# Patient Record
Sex: MALE | Race: BLACK OR AFRICAN AMERICAN | NOT HISPANIC OR LATINO | Employment: OTHER | URBAN - METROPOLITAN AREA
[De-identification: names, ages, dates, MRNs, and addresses within clinical notes are randomized per-mention and may not be internally consistent; named-entity substitution may affect disease eponyms.]

---

## 2017-04-22 ENCOUNTER — HOSPITAL ENCOUNTER (EMERGENCY)
Facility: HOSPITAL | Age: 62
Discharge: HOME | End: 2017-04-22
Attending: EMERGENCY MEDICINE
Payer: MEDICARE

## 2017-04-22 VITALS
HEART RATE: 70 BPM | BODY MASS INDEX: 29.62 KG/M2 | SYSTOLIC BLOOD PRESSURE: 144 MMHG | DIASTOLIC BLOOD PRESSURE: 72 MMHG | OXYGEN SATURATION: 99 % | WEIGHT: 200 LBS | HEIGHT: 69 IN | TEMPERATURE: 98 F | RESPIRATION RATE: 18 BRPM

## 2017-04-22 DIAGNOSIS — N39.0 URINARY TRACT INFECTION WITHOUT HEMATURIA, SITE UNSPECIFIED: Primary | ICD-10-CM

## 2017-04-22 LAB
ALBUMIN SERPL BCP-MCNC: 3.7 G/DL
ALP SERPL-CCNC: 56 U/L
ALT SERPL W/O P-5'-P-CCNC: 23 U/L
ANION GAP SERPL CALC-SCNC: 11 MMOL/L
APTT BLDCRRT: 26.3 SEC
AST SERPL-CCNC: 24 U/L
BACTERIA #/AREA URNS HPF: ABNORMAL /HPF
BASOPHILS # BLD AUTO: 0.02 K/UL
BASOPHILS NFR BLD: 0.3 %
BILIRUB SERPL-MCNC: 0.3 MG/DL
BILIRUB UR QL STRIP: NEGATIVE
BUN SERPL-MCNC: 18 MG/DL
CALCIUM SERPL-MCNC: 9 MG/DL
CHLORIDE SERPL-SCNC: 106 MMOL/L
CLARITY UR: ABNORMAL
CO2 SERPL-SCNC: 26 MMOL/L
COLOR UR: YELLOW
CREAT SERPL-MCNC: 1.9 MG/DL
DIFFERENTIAL METHOD: ABNORMAL
EOSINOPHIL # BLD AUTO: 0.2 K/UL
EOSINOPHIL NFR BLD: 3 %
ERYTHROCYTE [DISTWIDTH] IN BLOOD BY AUTOMATED COUNT: 15.5 %
EST. GFR  (AFRICAN AMERICAN): 43 ML/MIN/1.73 M^2
EST. GFR  (NON AFRICAN AMERICAN): 37 ML/MIN/1.73 M^2
GLUCOSE SERPL-MCNC: 175 MG/DL
GLUCOSE UR QL STRIP: NEGATIVE
HCT VFR BLD AUTO: 39.7 %
HGB BLD-MCNC: 13.2 G/DL
HGB UR QL STRIP: ABNORMAL
INR PPP: 1
KETONES UR QL STRIP: NEGATIVE
LEUKOCYTE ESTERASE UR QL STRIP: ABNORMAL
LYMPHOCYTES # BLD AUTO: 2.8 K/UL
LYMPHOCYTES NFR BLD: 45.6 %
MCH RBC QN AUTO: 29.1 PG
MCHC RBC AUTO-ENTMCNC: 33.2 %
MCV RBC AUTO: 87 FL
MICROSCOPIC COMMENT: ABNORMAL
MONOCYTES # BLD AUTO: 0.2 K/UL
MONOCYTES NFR BLD: 2.5 %
NEUTROPHILS # BLD AUTO: 3 K/UL
NEUTROPHILS NFR BLD: 48.6 %
NITRITE UR QL STRIP: NEGATIVE
PH UR STRIP: 7 [PH] (ref 5–8)
PLATELET # BLD AUTO: 372 K/UL
PMV BLD AUTO: 11.1 FL
POTASSIUM SERPL-SCNC: 4.3 MMOL/L
PROT SERPL-MCNC: 7.3 G/DL
PROT UR QL STRIP: NEGATIVE
PROTHROMBIN TIME: 10.5 SEC
RBC # BLD AUTO: 4.54 M/UL
RBC #/AREA URNS HPF: 4 /HPF (ref 0–4)
SODIUM SERPL-SCNC: 143 MMOL/L
SP GR UR STRIP: 1.01 (ref 1–1.03)
TROPONIN I SERPL DL<=0.01 NG/ML-MCNC: <0.006 NG/ML
URN SPEC COLLECT METH UR: ABNORMAL
UROBILINOGEN UR STRIP-ACNC: NEGATIVE EU/DL
WBC # BLD AUTO: 6.1 K/UL
WBC #/AREA URNS HPF: 80 /HPF (ref 0–5)
WBC CLUMPS URNS QL MICRO: ABNORMAL

## 2017-04-22 PROCEDURE — 87088 URINE BACTERIA CULTURE: CPT

## 2017-04-22 PROCEDURE — 81000 URINALYSIS NONAUTO W/SCOPE: CPT

## 2017-04-22 PROCEDURE — 87186 SC STD MICRODIL/AGAR DIL: CPT

## 2017-04-22 PROCEDURE — 85730 THROMBOPLASTIN TIME PARTIAL: CPT

## 2017-04-22 PROCEDURE — 63600175 PHARM REV CODE 636 W HCPCS: Performed by: EMERGENCY MEDICINE

## 2017-04-22 PROCEDURE — 87086 URINE CULTURE/COLONY COUNT: CPT

## 2017-04-22 PROCEDURE — 96365 THER/PROPH/DIAG IV INF INIT: CPT

## 2017-04-22 PROCEDURE — 99284 EMERGENCY DEPT VISIT MOD MDM: CPT | Mod: 25

## 2017-04-22 PROCEDURE — 96361 HYDRATE IV INFUSION ADD-ON: CPT

## 2017-04-22 PROCEDURE — 85610 PROTHROMBIN TIME: CPT

## 2017-04-22 PROCEDURE — 87077 CULTURE AEROBIC IDENTIFY: CPT

## 2017-04-22 PROCEDURE — 80053 COMPREHEN METABOLIC PANEL: CPT

## 2017-04-22 PROCEDURE — 25000003 PHARM REV CODE 250: Performed by: EMERGENCY MEDICINE

## 2017-04-22 PROCEDURE — 84484 ASSAY OF TROPONIN QUANT: CPT

## 2017-04-22 PROCEDURE — 85025 COMPLETE CBC W/AUTO DIFF WBC: CPT

## 2017-04-22 RX ORDER — CEPHALEXIN 500 MG/1
500 CAPSULE ORAL 4 TIMES DAILY
Qty: 40 CAPSULE | Refills: 0 | Status: SHIPPED | OUTPATIENT
Start: 2017-04-22 | End: 2017-05-02

## 2017-04-22 RX ADMIN — CEFTRIAXONE 1 G: 1 INJECTION, SOLUTION INTRAVENOUS at 09:04

## 2017-04-22 RX ADMIN — SODIUM CHLORIDE 1000 ML: 0.9 INJECTION, SOLUTION INTRAVENOUS at 06:04

## 2017-04-22 NOTE — ED TRIAGE NOTES
"Pt arrived via personal transportation from home. CC of LOC. Per care taker "He was in the car waiting on his wife to come out side, i saw his eyes roll back and he became unresponsive." Pt presents non-verbal, per caregiver, he can verbally communicate, but when getting medical attention he chooses not to speak. Pt is not reporting any pain/N/V/F/D/SOB/CP at this time. NAD at this time  "

## 2017-04-22 NOTE — ED AVS SNAPSHOT
[facility]  [place] [address]               Richard M Pipkins   2017  6:08 PM   ED    Description:  Male : 1955   Department:  [facility]           Your Care was Coordinated By:     Provider Role From To    Olvin Perez MD Attending Provider [id number] --      Reason for Visit     Loss of Consciousness           Diagnoses this Visit        Comments    Urinary tract infection without hematuria, site unspecified    -  Primary       ED Disposition     ED Disposition Condition Comment    Discharge  Our goal in the emergency department is to always give you outstanding care and exceptional service. You may receive a survey by mail or e-mail in the next week regarding your experience in our ED. We would greatly appreciate your completing and returnin g the survey. Your feedback provides us with a way to recognize our staff who give very good care and it helps us learn how to improve when your experience was below our aspiration of excellence.              To Do List           Follow-up Information     Follow up with Annalee Campso MD.    Specialty:  General Practice    Why:  As needed    Contact information:    [address] [facility] [address]  [facility] [address] [phone]          Follow up with [facility].    Specialty:  Emergency Medicine    Why:  If symptoms worsen    Contact information:    [place] [address] [phone]       These Medications        Disp Refills Start End    cephALEXin (KEFLEX) 500 MG capsule 40 capsule 0 2017    Take 1 capsule (500 mg total) by mouth 4 (four) times daily. - Oral    Pharmacy: [facility] - [place] [facility] [address] [facility]: [phone]         [facility]     [facility] Nurse Care Line -  Assistance  Unless otherwise directed by your provider, please contact [facility], our  nurse care line that is available for 24/7 assistance.     Registered nurses in the [facility] provide: appointment scheduling, clinical advisement, health education, and other advisory services.  Call: [phone] (toll free)               Medications           Message regarding Medications     Verify the changes and/or additions to your medication regime listed below are the same as discussed with your clinician today.  If any of these changes or additions are incorrect, please notify your healthcare provider.        START taking these NEW medications        Refills    cephALEXin (KEFLEX) 500 MG capsule 0    Sig: Take 1 capsule (500 mg total) by mouth 4 (four) times daily.    Class: Print    Route: Oral      These medications were administered today        Dose Freq    sodium chloride 0.9% bolus 1,000 mL 1,000 mL Once    Sig: Inject 1,000 mLs into the vein once.    Class: Normal    Route: Intravenous    cefTRIAXone (ROCEPHIN) 1 g in dextrose 5 % 50 mL IVPB 1 g ED 1 Time    Sig: Inject 50 mLs (1 g total) into the vein ED 1 Time.    Class: Normal    Route: Intravenous           Verify that the below list of medications is an accurate representation of the medications you are currently taking.  If none reported, the list may be blank. If incorrect, please contact your healthcare provider. Carry this list with you in case of emergency.           Current Medications     amlodipine (NORVASC) 10 MG tablet Take 10 mg by mouth once daily.     atorvastatin (LIPITOR) 40 MG tablet Take 40 mg by mouth once daily.     bethanechol (URECHOLINE) 25 MG Tab Take 25 mg by mouth once daily.     ergocalciferol (VITAMIN D2) 50,000 unit Cap Take 50,000 Units by mouth every 30 days.     lamotrigine (LAMICTAL) 100 MG tablet     lamotrigine (LAMICTAL) 25 MG tablet     LOVAZA 1 gram capsule Take 1 g by mouth 2 (two) times daily.     metoprolol succinate (TOPROL-XL) 100 MG 24 hr tablet Take 100 mg by mouth once daily.      "MULTIVIT-IRON-MIN-FOLIC ACID 3,500-18-0.4 UNIT-MG-MG ORAL CHEW Take by mouth.    multivitamin capsule Take 1 capsule by mouth once daily.    oxybutynin (DITROPAN) 5 MG Tab Take 5 mg by mouth 3 (three) times daily.     pravastatin (PRAVACHOL) 40 MG tablet Take 40 mg by mouth once daily.     RANEXA 500 mg Tb12 Take 500 mg by mouth 2 (two) times daily.     solifenacin (VESICARE) 5 MG tablet Take 10 mg by mouth once daily.    tamsulosin (FLOMAX) 0.4 mg Cp24 Take 0.4 mg by mouth once daily.     VICTOZA 3-ANATOLIY 0.6 mg/0.1 mL (18 mg/3 mL) PnIj Inject 1.2 mg into the skin every morning.     cefTRIAXone (ROCEPHIN) 1 g in dextrose 5 % 50 mL IVPB Inject 50 mLs (1 g total) into the vein ED 1 Time.    cephALEXin (KEFLEX) 500 MG capsule Take 1 capsule (500 mg total) by mouth 4 (four) times daily.    sertraline (ZOLOFT) 50 MG tablet Take 1.5 tablets (75 mg total) by mouth once daily.           Clinical Reference Information           Your Vitals Were     BP Pulse Temp Resp Height Weight    135/64 66 99 °F (37.2 °C) (Oral) 18 5' 9" (1.753 m) 90.7 kg (200 lb)    SpO2 BMI             97% 29.53 kg/m2         Allergies as of 4/22/2017        Reactions    Penicillins       Immunizations Administered on Date of Encounter - 4/22/2017     None      ED Micro, Lab, POCT     Start Ordered       Status Ordering Provider    04/22/17 2104 04/22/17 2103  Urine culture **CANNOT BE ORDERED STAT**  Once      In process     04/22/17 1819 04/22/17 1818  Protime-INR  STAT      Final result     04/22/17 1818 04/22/17 1818  Urinalysis Microscopic  Once      Final result     04/22/17 1816 04/22/17 1818  CBC auto differential  STAT      Final result     04/22/17 1816 04/22/17 1818  Comprehensive metabolic panel  STAT      Final result     04/22/17 1816 04/22/17 1818  Urinalysis - Clean Catch  STAT      Final result     04/22/17 1816 04/22/17 1818  APTT  STAT      Final result     04/22/17 1816 04/22/17 1818  Troponin I  STAT      Final result     04/22/17 " 1815 04/22/17 1814  POCT glucose  Once      Acknowledged       ED Imaging Orders     Start Ordered       Status Ordering Provider    04/22/17 1816 04/22/17 1818  X-Ray Chest AP Portable  1 time imaging      Final result     04/22/17 1816 04/22/17 1818  CT Head Without Contrast  1 time imaging      Final result         Discharge Instructions         Bladder Infection, Male (Adult)    You have a bladder infection.  Urine is normally free of bacteria. But bacteria can get into the urinary tract from the skin around the rectum or it may travel in the blood from elsewhere in the body.  This is called a urinary tract infection (UTI). An infection can occur anywhere in the urinary tract. It could be in a kidney (pyelonephritis)or in the bladder (cystitis) and urethra (urethritis). The urethra is the tube that drains the urine from the bladder through the tip of the penis.  The most common place for a UTI is in the bladder. This is called a bladder infection. Most bladder infections are easily treated. They are not serious unless the infection spreads up to the kidney.  The terms bladder infection, UTI, and cystitis are often used to describe the same thing, but they arent always the same. Cystitis is an inflammation of the bladder. The most common cause of cystitis is an infection.   Keep in mind:  · Infections in the urine are called UTIs.  · Cystitis is usually caused by a UTI.  · Not all UTIs and cases of cystitis are bladder infections.  · Bladder infections are the most common type of cystitis.  Symptoms of a bladder infection  The infection causes inflammation in the urethra and bladder. This inflammation causes many of the symptoms. The most common symptoms of a bladder infection are:  · Pain or burning when urinating  · Having to go more often than usual  · Feeling like you need to go right away  · Only a small amount comes out  · Blood in urine  · Discomfort in your belly (abdomen), usually in the lower abdomen,  above the pubic bone  · Cloudy, strong, or bad smelling urine  · Unable to urinate (retention)  · Urinary incontinence  · Fever  · Loss of appetite  Older adults may also feel confused.  Causes of a bladder infection  Bladder infections are not contagious. You can't get one from someone else, from a toilet seat, or from sharing a bath.  The most common cause of bladder infections is bacteria from the bowels. The bacteria get onto the skin around the opening of the urethra. From there they can get into the urine and travel up to the bladder. This causes inflammation and an infection. This usually happens because of:  · An enlarged prostate  · Poor cleaning of the genitals  · Procedures that put a tube in your bladder, like a Causey catheter  · Bowel incontinence  · Older age  · Not emptying your bladder (The urine stays there, giving the bacteria a chance to grow.)  · Dehydration (This allows urine to stay in the bladder longer.)  · Constipation (This can cause the bowels to push on the bladder or urethra and keep the bladder from emptying.)  Treatment  Bladder infections are treated with antibiotics. They usually clear up quickly without complications. Treatment helps prevent a more serious kidney infection.  Medicines  Medicines can help in the treatment of a bladder infection:  · You may have been given phenazopyridine to ease burning when you urinate. It will cause your urine to be bright orange. It can stain clothing.  · You may have been prescribed antibiotics. Take this medicine until you have finished it, even if you feel better. Taking all of the medicine will make sure the infection has cleared.  You can use acetaminophen or ibuprofen for pain, fever, or discomfort, unless another medicine was prescribed. You can also alternate them, or use both together. They work differently and are a different class of medicines, so taking them together is not an overdose. If you have chronic liver or kidney disease, talk  with your healthcare provider before using these medicines. Also talk with your provider if youve had a stomach ulcer or GI bleeding or are taking blood thinner medicines.  Home care  Here are some guidelines to help you care for yourself at home:  · Drink plenty of fluids, unless your healthcare provider told you not to. Fluids will prevent dehydration and flush out your bladder.  · Use good personal hygiene. Wipe from front to back after using the toilet, and clean your penis regularly. If you arent circumcised, retract the foreskin when cleaning.  · Urinate more frequently, and dont try to hold it in for long periods of time, if possible.  · Wear loose-fitting clothes and cotton underwear. Avoid tight-fitting pants. This helps keep you clean and dry.  · Change your diet to prevent constipation. This means eating more fresh foods and more fiber, and less junk and fatty foods.  · Avoid sex until your symptoms are gone.  · Avoid caffeine, alcohol, and spicy foods. These can irritate the bladder.  Follow-up care  Follow up with your healthcare provider, or as advised if all symptoms have not cleared up within 5 days. It is important to keep your follow-up appointment. You can talk with your provider to see if you need more tests of the urinary tract. This is especially important if you have infections that keep coming back.  If a culture was done, you will be told if your treatment needs to be changed. If directed, you can call to find out the results.  If X-rays were taken, you will be told of any findings that may affect your care.  Call 911  Call 911 if any of these occur:  · Trouble breathing  · Difficulty waking up  · Feeling confused  · Fainting or loss of consciousness  · Rapid heart rate  When to seek medical advice  Call your healthcare provider right away if any of these occur:  · Fever of 100.4ºF (38ºC) or higher, or as directed by your healthcare provider  · Your symptoms dont improve after 2 days of  treatment  · Back or abdominal pain that gets worse  · Repeated vomiting, or you arent able to keep medicine down  · Weakness or dizziness  Date Last Reviewed: 10/1/2016  © 6716-5526 [employer]. [address] [place] [address], [place] [address]. All rights reserved. This information is not intended as a substitute for professional medical care. Always follow your healthcare professional's instructions.          [facility] Sign-Up     Activating your [facility] account is as easy as 1-2-3!     1) Visit [email or web address], select Sign Up Now, enter this activation code and your date of birth, then select Next.  [email or web address]  Expires: 6/6/2017  9:34 PM      2) Create a username and password to use when you visit [facility] in the future and select a security question in case you lose your password and select Next.    3) Enter your e-mail address and click Sign Up!    Additional Information  If you have questions, please e-mail [email or web address] or call [phone] to talk to our [facility] staff. Remember, [facility] is NOT to be used for urgent needs. For medical emergencies, dial 911.          [facility] complies with applicable Federal civil rights laws and does not discriminate on the basis of race, color, national origin, age, disability, or sex.        Language Assistance Services     ATTENTION: Language assistance services are available, free of charge. Please call [phone].      ATENCIÓN: Si habla español, tiene a espinoza disposición servicios gratuitos de asistencia lingüística. Llame al [phone].     CHÚ Ý: N?u b?n nói Ti?ng Vi?t, có các d?ch v? h? tr? ngôn ng? mi?n phí dành cho b?n. G?i s? [phone].

## 2017-04-22 NOTE — ED NOTES
1 unsuccessful attempt for iv insertion by Deisy SCHMIDT and one unsuccessful attempt of iv insertion x1

## 2017-04-22 NOTE — ED PROVIDER NOTES
"Encounter Date: 4/22/2017    SCRIBE #1 NOTE: I, Jordan Joann , am scribing for, and in the presence of,  Olvin Perez MD . I have scribed the following portions of the note - Other sections scribed: HPI, ROS, EKG, MDM .       History     Chief Complaint   Patient presents with    Loss of Consciousness     arrived via POV with family, reports syncope episode while in car, caretaker reports pt's eyes rolled back, Hx: DM/ brain surgery x2, stroke     Review of patient's allergies indicates:   Allergen Reactions    Penicillins      HPI Comments: CC: Loss of Consciousness     HPI: This 61 y.o. male with a medical history of Diabetes mellitus; Hypertension; Seizures; and Stroke presents to the ED accompanied by home nurse and wife for evaluation of loss of consciousness while pt was sitting in the car about to go to the dry . Home nurse reports pt's eyes rolled back and were wide open during episode of loss of consciousness. Home nurse then attempted to get a response from the pt by slapping his face, pulling his eyelids, and waving his hands over pt's eyes, with no response. Symptoms are acute in onset. Per home nurse, pt's symptoms look very similar to history of stroke, and not like history of seizures. Home nurse reports pt is normally able to talk shortly after a seizure. Home nurse states since pt is not able to communicate during evaluation that he thinks it is another stroke episode. Home nurse was hired after a stroke episode to help with pt's daily living task (eating, bathroom, and getting dressed) and assistance at  program. Per wife, pt uses walker regularly. Pt has no modifying factors.  She also states that the patient "does this ".  She states that he stops responding and then he gets into the ER, gets worked up and everything TURNS OUT NEGATIVE.  Pt has no prior treatment.     History is limited due to pt's unresponsiveness and otherwise supplemented by pt's home nurse and wife. "       The history is provided by a caregiver and the spouse. The history is limited by the condition of the patient. No  was used.     Past Medical History:   Diagnosis Date    Diabetes mellitus     Hypertension     Seizures     Stroke      Past Surgical History:   Procedure Laterality Date    BRAIN SURGERY       History reviewed. No pertinent family history.  Social History   Substance Use Topics    Smoking status: Never Smoker    Smokeless tobacco: None    Alcohol use No     Review of Systems   Unable to perform ROS: Patient unresponsive   Neurological:        (+) Loss of Consciousness        Physical Exam   Initial Vitals   BP Pulse Resp Temp SpO2   04/22/17 1812 04/22/17 1808 04/22/17 1812 04/22/17 1812 04/22/17 1808   103/60 60 18 99 °F (37.2 °C) 97 %     Physical Exam    Nursing note and vitals reviewed.  Constitutional: Vital signs are normal. He appears well-developed and well-nourished.  Non-toxic appearance. No distress.   HENT:   Head: Normocephalic and atraumatic.   Eyes: EOM are normal.   Neck: Trachea normal. Neck supple.   Cardiovascular: Normal rate and regular rhythm.   Pulmonary/Chest: Breath sounds normal. No respiratory distress.   Abdominal: Soft. Normal appearance and bowel sounds are normal. He exhibits no distension. There is no tenderness.   Musculoskeletal: Normal range of motion. He exhibits no edema.   Neurological:   Pt lies with eyes closed.  He localizes pain.  When asked if he wants Weeks's, he keeps his eyes closed but smiles and licks his lips.  His arm does not fall aimlessly when lifted in the air but rather he uses it to grab his head.  No seizure activity is noted.   Skin: Skin is warm, dry and intact.   Psychiatric: He has a normal mood and affect.         ED Course   Procedures  Labs Reviewed   CBC W/ AUTO DIFFERENTIAL - Abnormal; Notable for the following:        Result Value    RBC 4.54 (*)     Hemoglobin 13.2 (*)     Hematocrit 39.7 (*)      RDW 15.5 (*)     Platelets 372 (*)     Mono # 0.2 (*)     Mono% 2.5 (*)     All other components within normal limits   COMPREHENSIVE METABOLIC PANEL - Abnormal; Notable for the following:     Glucose 175 (*)     Creatinine 1.9 (*)     eGFR if  43 (*)     eGFR if non  37 (*)     All other components within normal limits   URINALYSIS - Abnormal; Notable for the following:     Appearance, UA Hazy (*)     Occult Blood UA 1+ (*)     Leukocytes, UA 3+ (*)     All other components within normal limits   URINALYSIS MICROSCOPIC - Abnormal; Notable for the following:     WBC, UA 80 (*)     WBC Clumps, UA Many (*)     Bacteria, UA Moderate (*)     All other components within normal limits   CULTURE, URINE   APTT   TROPONIN I   PROTIME-INR     EKG Readings: (Independently Interpreted)   Initial Reading: No STEMI. Rhythm: Normal Sinus Rhythm. Heart Rate: 61. ST Segments: Normal ST Segments. T Waves: Normal.          Medical Decision Making:   History:   Old Medical Records: I decided to obtain old medical records.  Clinical Tests:   Lab Tests: Ordered and Reviewed  Radiological Study: Ordered and Reviewed  Medical Tests: Ordered and Reviewed  ED Management:  This was a 61-year-old male who became acutely unresponsive in the car while running errands with his wife and nurse.  The patient has a history of multiple strokes and is dependent on care for activities of daily living.  He does not talk very much.  His wife states that the patient will occasionally stop reacting but that workup is negative.  Today, the patient was unresponsive but seemed to be awake and resisting responsiveness.  He did not appear to be sedated or comatose.  He localized pain very well.  When asked if he wanted his favorite food, the patient smiled and licked his lips.  His workup was negative for acute stroke, anemia, dehydration, arrhythmia, ACS.  He did have a UTI.  This was treated with rocephin.  He will be discharged  in stable condition.  Upon discharge, the patient got up and walked out the ER.            Scribe Attestation:   Scribe #1: I performed the above scribed service and the documentation accurately describes the services I performed. I attest to the accuracy of the note.    Attending Attestation:           Physician Attestation for Scribe:  Physician Attestation Statement for Scribe #1: I, Olvin Perez MD , reviewed documentation, as scribed by Jordan Boss in my presence, and it is both accurate and complete.                 ED Course     Clinical Impression:   The encounter diagnosis was Urinary tract infection without hematuria, site unspecified.    Disposition:   Disposition: Discharged  Condition: Stable       Olvin Perez MD  04/24/17 033

## 2017-04-23 NOTE — DISCHARGE INSTRUCTIONS
Bladder Infection, Male (Adult)    You have a bladder infection.  Urine is normally free of bacteria. But bacteria can get into the urinary tract from the skin around the rectum or it may travel in the blood from elsewhere in the body.  This is called a urinary tract infection (UTI). An infection can occur anywhere in the urinary tract. It could be in a kidney (pyelonephritis)or in the bladder (cystitis) and urethra (urethritis). The urethra is the tube that drains the urine from the bladder through the tip of the penis.  The most common place for a UTI is in the bladder. This is called a bladder infection. Most bladder infections are easily treated. They are not serious unless the infection spreads up to the kidney.  The terms bladder infection, UTI, and cystitis are often used to describe the same thing, but they arent always the same. Cystitis is an inflammation of the bladder. The most common cause of cystitis is an infection.   Keep in mind:  · Infections in the urine are called UTIs.  · Cystitis is usually caused by a UTI.  · Not all UTIs and cases of cystitis are bladder infections.  · Bladder infections are the most common type of cystitis.  Symptoms of a bladder infection  The infection causes inflammation in the urethra and bladder. This inflammation causes many of the symptoms. The most common symptoms of a bladder infection are:  · Pain or burning when urinating  · Having to go more often than usual  · Feeling like you need to go right away  · Only a small amount comes out  · Blood in urine  · Discomfort in your belly (abdomen), usually in the lower abdomen, above the pubic bone  · Cloudy, strong, or bad smelling urine  · Unable to urinate (retention)  · Urinary incontinence  · Fever  · Loss of appetite  Older adults may also feel confused.  Causes of a bladder infection  Bladder infections are not contagious. You can't get one from someone else, from a toilet seat, or from sharing a bath.  The most  common cause of bladder infections is bacteria from the bowels. The bacteria get onto the skin around the opening of the urethra. From there they can get into the urine and travel up to the bladder. This causes inflammation and an infection. This usually happens because of:  · An enlarged prostate  · Poor cleaning of the genitals  · Procedures that put a tube in your bladder, like a Causey catheter  · Bowel incontinence  · Older age  · Not emptying your bladder (The urine stays there, giving the bacteria a chance to grow.)  · Dehydration (This allows urine to stay in the bladder longer.)  · Constipation (This can cause the bowels to push on the bladder or urethra and keep the bladder from emptying.)  Treatment  Bladder infections are treated with antibiotics. They usually clear up quickly without complications. Treatment helps prevent a more serious kidney infection.  Medicines  Medicines can help in the treatment of a bladder infection:  · You may have been given phenazopyridine to ease burning when you urinate. It will cause your urine to be bright orange. It can stain clothing.  · You may have been prescribed antibiotics. Take this medicine until you have finished it, even if you feel better. Taking all of the medicine will make sure the infection has cleared.  You can use acetaminophen or ibuprofen for pain, fever, or discomfort, unless another medicine was prescribed. You can also alternate them, or use both together. They work differently and are a different class of medicines, so taking them together is not an overdose. If you have chronic liver or kidney disease, talk with your healthcare provider before using these medicines. Also talk with your provider if youve had a stomach ulcer or GI bleeding or are taking blood thinner medicines.  Home care  Here are some guidelines to help you care for yourself at home:  · Drink plenty of fluids, unless your healthcare provider told you not to. Fluids will prevent  dehydration and flush out your bladder.  · Use good personal hygiene. Wipe from front to back after using the toilet, and clean your penis regularly. If you arent circumcised, retract the foreskin when cleaning.  · Urinate more frequently, and dont try to hold it in for long periods of time, if possible.  · Wear loose-fitting clothes and cotton underwear. Avoid tight-fitting pants. This helps keep you clean and dry.  · Change your diet to prevent constipation. This means eating more fresh foods and more fiber, and less junk and fatty foods.  · Avoid sex until your symptoms are gone.  · Avoid caffeine, alcohol, and spicy foods. These can irritate the bladder.  Follow-up care  Follow up with your healthcare provider, or as advised if all symptoms have not cleared up within 5 days. It is important to keep your follow-up appointment. You can talk with your provider to see if you need more tests of the urinary tract. This is especially important if you have infections that keep coming back.  If a culture was done, you will be told if your treatment needs to be changed. If directed, you can call to find out the results.  If X-rays were taken, you will be told of any findings that may affect your care.  Call 911  Call 911 if any of these occur:  · Trouble breathing  · Difficulty waking up  · Feeling confused  · Fainting or loss of consciousness  · Rapid heart rate  When to seek medical advice  Call your healthcare provider right away if any of these occur:  · Fever of 100.4ºF (38ºC) or higher, or as directed by your healthcare provider  · Your symptoms dont improve after 2 days of treatment  · Back or abdominal pain that gets worse  · Repeated vomiting, or you arent able to keep medicine down  · Weakness or dizziness  Date Last Reviewed: 10/1/2016  © 2484-5088 [employer]. [address] [place] [address], [place] [address]. All rights reserved. This information is not intended as a substitute for professional  medical care. Always follow your healthcare professional's instructions.

## 2017-04-25 LAB — BACTERIA UR CULT: NORMAL

## 2019-01-25 ENCOUNTER — OFFICE VISIT (OUTPATIENT)
Dept: URGENT CARE | Facility: CLINIC | Age: 64
End: 2019-01-25
Payer: MEDICARE

## 2019-01-25 VITALS
HEART RATE: 66 BPM | OXYGEN SATURATION: 98 % | WEIGHT: 200 LBS | TEMPERATURE: 98 F | RESPIRATION RATE: 18 BRPM | DIASTOLIC BLOOD PRESSURE: 85 MMHG | SYSTOLIC BLOOD PRESSURE: 164 MMHG | HEIGHT: 69 IN | BODY MASS INDEX: 29.62 KG/M2

## 2019-01-25 DIAGNOSIS — S01.112A LACERATION OF LEFT EYEBROW, INITIAL ENCOUNTER: Primary | ICD-10-CM

## 2019-01-25 PROCEDURE — 99214 OFFICE O/P EST MOD 30 MIN: CPT | Mod: S$GLB,,, | Performed by: SURGERY

## 2019-01-25 PROCEDURE — 99214 PR OFFICE/OUTPT VISIT, EST, LEVL IV, 30-39 MIN: ICD-10-PCS | Mod: S$GLB,,, | Performed by: SURGERY

## 2019-01-25 RX ORDER — MUPIROCIN 20 MG/G
OINTMENT TOPICAL DAILY
Qty: 1 TUBE | Refills: 0 | Status: ON HOLD | OUTPATIENT
Start: 2019-01-25 | End: 2019-12-09 | Stop reason: HOSPADM

## 2019-01-26 NOTE — PROGRESS NOTES
"Subjective:       Patient ID: Richard M Pipkins is a 63 y.o. male.    Vitals:  height is 5' 9" (1.753 m) and weight is 90.7 kg (200 lb). His temperature is 97.7 °F (36.5 °C). His blood pressure is 164/85 (abnormal) and his pulse is 66. His respiration is 18 and oxygen saturation is 98%.     Chief Complaint: Laceration    Laceration    The incident occurred less than 1 hour ago. The laceration is located on the face. The laceration is 1 cm in size. The laceration mechanism was a blunt object. The pain is mild. It is unknown if a foreign body is present. His tetanus status is unknown.       Constitution: Negative for fatigue.   HENT: Negative for facial swelling and facial trauma.    Neck: Negative for neck stiffness.   Cardiovascular: Negative for chest trauma.   Eyes: Negative for eye trauma, double vision and blurred vision.   Gastrointestinal: Negative for abdominal trauma, abdominal pain and rectal bleeding.   Genitourinary: Negative for hematuria, genital trauma and pelvic pain.   Musculoskeletal: Negative for pain, trauma, joint swelling, abnormal ROM of joint and pain with walking.   Skin: Positive for laceration. Negative for color change, wound and abrasion.   Neurological: Negative for dizziness, history of vertigo, light-headedness, coordination disturbances, altered mental status and loss of consciousness.   Hematologic/Lymphatic: Negative for history of bleeding disorder.   Psychiatric/Behavioral: Negative for altered mental status.       Objective:      Physical Exam    Assessment:       1. Laceration of left eyebrow, initial encounter        Plan:         Laceration of left eyebrow, initial encounter  -     mupirocin (BACTROBAN) 2 % ointment; Apply topically once daily.  Dispense: 1 Tube; Refill: 0         "

## 2019-01-26 NOTE — PATIENT INSTRUCTIONS
Face Laceration: Suture or Tape  A laceration is a cut through the skin. This will require stitches if it is deep. Minor cuts may be treated with surgical tape.    Home care  · Your healthcare provider may prescribe an antibiotic. This is to help prevent infection. Follow all instructions for taking this medicine. Take the medicine every day until it is gone or you are told to stop. You should not have any left over.  · The healthcare provider may prescribe medicines for pain. Follow instructions for taking them.  · Follow the healthcare providers instructions on how to care for the cut.  · Wash your hands with soap and warm water before and after caring for the cut. This helps prevent infection.  · If a bandage was applied and it becomes wet or dirty, replace it. Otherwise, leave it in place for the first 24 hours, then change it once a day or as directed.  · If sutures were used, clean the wound daily:  ¨ After removing the bandage, wash the area with soap and water. Use a wet cotton swab to loosen and remove any blood or crust that forms.  ¨ After cleaning, keep the wound clean and dry. Talk with your doctor before applying any antibiotic ointment to the wound. Reapply a fresh bandage.  ¨ You may remove the bandage to shower as usual after the first 24 hours, but do not soak the area in water (no swimming) until the sutures are removed.  · If surgical tape was used, keep the area clean and dry. If it becomes wet, blot it dry with a towel.  · Most facial skin wounds heal without problems. However, an infection sometimes occurs despite proper treatment. Therefore, watch for the signs of infection listed below.  Follow-up care  Follow up with your healthcare provider as advised. Be sure to return for suture removal as directed. Ask your provider how long sutures should remain in place. If surgical tape closures were used, you may remove them yourself when your provider recommends if they have not fallen off on  their own.  When to seek medical advice  Call your healthcare provider right away if any of these occur:  · Wound bleeding not controlled by direct pressure  · Signs of infection, including increasing pain in the wound, increasing wound redness or swelling, or pus or bad odor coming from the wound  · Fever of 100.4°F (38ºC) or higher or as directed by your healthcare provider  · Stitches come apart or fall out or surgical tape falls off before 5 days  · Wound edges re-open  · Wound changes colors  · Numbness around the wound   Date Last Reviewed: 6/14/2015  © 3994-7407 [employer]. [address] [place], [place] [id number]. All rights reserved. This information is not intended as a substitute for professional medical care. Always follow your healthcare professional's instructions.

## 2019-02-09 ENCOUNTER — OFFICE VISIT (OUTPATIENT)
Dept: URGENT CARE | Facility: CLINIC | Age: 64
End: 2019-02-09
Payer: MEDICARE

## 2019-02-09 VITALS
TEMPERATURE: 98 F | SYSTOLIC BLOOD PRESSURE: 153 MMHG | HEART RATE: 71 BPM | BODY MASS INDEX: 29.53 KG/M2 | OXYGEN SATURATION: 95 % | WEIGHT: 200 LBS | DIASTOLIC BLOOD PRESSURE: 90 MMHG

## 2019-02-09 DIAGNOSIS — Z48.02 VISIT FOR SUTURE REMOVAL: Primary | ICD-10-CM

## 2019-02-09 DIAGNOSIS — R03.0 ELEVATED BLOOD PRESSURE READING: ICD-10-CM

## 2019-02-09 PROCEDURE — 99499 NO LOS: ICD-10-PCS | Mod: S$GLB,,, | Performed by: PHYSICIAN ASSISTANT

## 2019-02-09 PROCEDURE — 99499 UNLISTED E&M SERVICE: CPT | Mod: S$GLB,,, | Performed by: PHYSICIAN ASSISTANT

## 2019-02-09 RX ORDER — BLOOD SUGAR DIAGNOSTIC
STRIP MISCELLANEOUS
Status: ON HOLD | COMMUNITY
Start: 2019-01-12 | End: 2019-12-09 | Stop reason: HOSPADM

## 2019-02-09 RX ORDER — ATORVASTATIN CALCIUM 80 MG/1
80 TABLET, FILM COATED ORAL NIGHTLY
COMMUNITY
Start: 2018-11-26

## 2019-02-09 RX ORDER — METOPROLOL SUCCINATE 25 MG/1
12.5 TABLET, EXTENDED RELEASE ORAL
COMMUNITY
Start: 2019-01-14

## 2019-02-09 RX ORDER — CLOPIDOGREL BISULFATE 75 MG/1
75 TABLET ORAL ONCE
COMMUNITY
Start: 2018-12-31

## 2019-02-09 RX ORDER — SERTRALINE HYDROCHLORIDE 100 MG/1
100 TABLET, FILM COATED ORAL DAILY
COMMUNITY
Start: 2018-11-26

## 2019-02-09 RX ORDER — LEVETIRACETAM 500 MG/1
500 TABLET ORAL 2 TIMES DAILY
Status: ON HOLD | COMMUNITY
Start: 2018-12-10 | End: 2019-12-09 | Stop reason: HOSPADM

## 2019-02-09 RX ORDER — AMLODIPINE BESYLATE 5 MG/1
5 TABLET ORAL DAILY
COMMUNITY
Start: 2018-12-31

## 2019-02-09 RX ORDER — QUETIAPINE FUMARATE 25 MG/1
TABLET, FILM COATED ORAL
COMMUNITY
Start: 2018-12-10

## 2019-02-09 NOTE — PATIENT INSTRUCTIONS
If you were prescribed a narcotic or controlled medication, do not drive or operate heavy equipment or machinery while taking these medications.  You must understand that you've received an Urgent Care treatment only and that you may be released before all your medical problems are known or treated. You, the patient, will arrange for follow up care as instructed.  Follow up with your PCP or specialty clinic as directed in the next 1-2 weeks if not improved or as needed.  You can call [phone] to schedule an appointment with the appropriate provider.  If your condition worsens we recommend that you receive another evaluation at the emergency room immediately or contact your primary medical clinics after hours call service to discuss your concerns.  Please return here or go to the Emergency Department for any concerns or worsening of condition.      Suture/Staple Removal  Please return here or go to the Emergency Department for any concerns or worsening of condition.  If you were prescribed antibiotics, please take them to completion.  If you were prescribed a narcotic medication, do not drive or operate heavy equipment or machinery while taking these medications.  If you have any of the following symptoms below, please go directly to the ER:  · Wound reopens or bleeds  · Signs of an infection, such as:  ¨ Increasing redness or swelling around the wound  ¨ Increased warmth from the wound  ¨ Worsening pain  ¨ Red streaking lines away from the wound  ¨ Fluid draining from the wound  · Fever of 100.4°F (38°C) or higher, or as directed by your child's healthcare provider  Please follow up with your primary care doctor or specialist in the next 48-72hrs as needed.    If you  smoke, please stop smoking.          Suture or Staple Removal  You were seen today for a suture or staple removal. Your wound is healing as expected. The wound has healed well enough that the sutures or staples can be removed. The wound will  "continue to heal for the next few months.  At this time there is no sign of infection.   Home care  · If you have pain, take pain medicine as advised by your healthcare provider.   · Keep your wound clean and protected by covering it with a bandage for the next week or so.   · Wash your hands with soap and warm water before and after caring for your wound. This helps prevent infection.  · Clean the wound gently with soap and warm water daily or as directed by your childs health care provider. Do not use iodine, alcohol, or other cleansers on the wound.  Gently pat it dry. Put on a new bandage, if needed. Do not reuse bandages.  · If the area gets wet, gently pat it dry with a clean cloth. Replace the wet bandage with a dry one.  · Check the wound daily for signs of infection. (These are listed under "When to seek medical advice" below.)  · You may shower and bathe as usual. Swimming is now permitted.  Follow-up care  Follow up with your healthcare provider as advised.  When to seek medical advice   Call your healthcare provider if any of the following occur:  · Wound reopens or bleeds  · Signs of an infection, such as:  ¨ Increasing redness or swelling around the wound  ¨ Increased warmth from the wound  ¨ Worsening pain  ¨ Red streaking lines away from the wound  ¨ Fluid draining from the wound  · Fever of 100.4°F (38°C) or higher, or as directed by your child's healthcare provider  Date Last Reviewed: 9/27/2015  © 1426-7023 [employer]. [address] [place] [address], [place] [address]. All rights reserved. This information is not intended as a substitute for professional medical care. Always follow your healthcare professional's instructions.    Patient's BP is elevated today. Patient did not take their  medication today.  No headache, Chest pain, or SOB. Patient has been advised to take their medicine and monitor the BP for the next few days.  If it stays elevated, patient should contact their PCP.    "

## 2019-02-09 NOTE — PROGRESS NOTES
Subjective:       Patient ID: Richard M Pipkins is a 63 y.o. male.    Vitals:  weight is 90.7 kg (200 lb). His temperature is 97.8 °F (36.6 °C). His blood pressure is 153/90 (abnormal) and his pulse is 71. His oxygen saturation is 95%.     Chief Complaint: Suture / Staple Removal    2 stitches to left eyebrow to remove from 7 days ago.      Suture / Staple Removal   The sutures were placed 7 to 10 days ago. He tried antibiotic ointment use since the wound repair. The treatment provided significant relief. There has been no drainage from the wound. There is no redness present. There is no swelling present. There is no pain present.       Constitution: Negative for chills, fatigue and fever.   HENT: Negative for tinnitus, postnasal drip, sinus pain, sinus pressure, sore throat and trouble swallowing.    Neck: Negative for painful lymph nodes.   Respiratory: Negative for cough, COPD, shortness of breath, wheezing and asthma.    Skin: Negative for erythema.   Allergic/Immunologic: Negative for asthma.   Hematologic/Lymphatic: Negative for swollen lymph nodes.       Objective:      Physical Exam   Constitutional: He is oriented to person, place, and time. He appears well-developed and well-nourished. He is cooperative.  Non-toxic appearance. He does not appear ill. No distress.   HENT:   Head: Normocephalic and atraumatic.   Right Ear: Hearing, tympanic membrane, external ear and ear canal normal.   Left Ear: Hearing, tympanic membrane, external ear and ear canal normal.   Nose: Nose normal. No mucosal edema, rhinorrhea or nasal deformity. No epistaxis. Right sinus exhibits no maxillary sinus tenderness and no frontal sinus tenderness. Left sinus exhibits no maxillary sinus tenderness and no frontal sinus tenderness.   Mouth/Throat: Uvula is midline, oropharynx is clear and moist and mucous membranes are normal. No trismus in the jaw. Normal dentition. No uvula swelling. No posterior oropharyngeal erythema.   Eyes:  Conjunctivae and lids are normal. No scleral icterus.   Sclera clear bilat   Neck: Trachea normal, full passive range of motion without pain and phonation normal. Neck supple.   Cardiovascular: Normal rate, regular rhythm, normal heart sounds, intact distal pulses and normal pulses.   Pulmonary/Chest: Effort normal and breath sounds normal. No accessory muscle usage or stridor. No respiratory distress. He has no decreased breath sounds. He has no wheezes. He has no rhonchi. He has no rales.   Abdominal: Soft. Normal appearance and bowel sounds are normal. He exhibits no distension. There is no tenderness.   Musculoskeletal: Normal range of motion. He exhibits no edema or deformity.   Neurological: He is alert and oriented to person, place, and time. He exhibits normal muscle tone. Coordination normal.   Skin: Skin is warm and dry. No abrasion, no bruising, no burn, no ecchymosis, no lesion, no petechiae and no rash noted. He is not diaphoretic. No erythema. No pallor.   2 stitches removed from left eyebrow. Healing well without TTP, erythema or drainage.   Psychiatric: He has a normal mood and affect. His speech is normal and behavior is normal. Judgment and thought content normal. Cognition and memory are normal.   Nursing note and vitals reviewed.      Assessment:       1. Visit for suture removal    2. Elevated blood pressure reading        Plan:         Visit for suture removal    Elevated blood pressure reading      Patient Instructions   If you were prescribed a narcotic or controlled medication, do not drive or operate heavy equipment or machinery while taking these medications.  You must understand that you've received an Urgent Care treatment only and that you may be released before all your medical problems are known or treated. You, the patient, will arrange for follow up care as instructed.  Follow up with your PCP or specialty clinic as directed in the next 1-2 weeks if not improved or as needed.  You  can call [phone] to schedule an appointment with the appropriate provider.  If your condition worsens we recommend that you receive another evaluation at the emergency room immediately or contact your primary medical clinics after hours call service to discuss your concerns.  Please return here or go to the Emergency Department for any concerns or worsening of condition.      Suture/Staple Removal  Please return here or go to the Emergency Department for any concerns or worsening of condition.  If you were prescribed antibiotics, please take them to completion.  If you were prescribed a narcotic medication, do not drive or operate heavy equipment or machinery while taking these medications.  If you have any of the following symptoms below, please go directly to the ER:  · Wound reopens or bleeds  · Signs of an infection, such as:  ¨ Increasing redness or swelling around the wound  ¨ Increased warmth from the wound  ¨ Worsening pain  ¨ Red streaking lines away from the wound  ¨ Fluid draining from the wound  · Fever of 100.4°F (38°C) or higher, or as directed by your child's healthcare provider  Please follow up with your primary care doctor or specialist in the next 48-72hrs as needed.    If you  smoke, please stop smoking.          Suture or Staple Removal  You were seen today for a suture or staple removal. Your wound is healing as expected. The wound has healed well enough that the sutures or staples can be removed. The wound will continue to heal for the next few months.  At this time there is no sign of infection.   Home care  · If you have pain, take pain medicine as advised by your healthcare provider.   · Keep your wound clean and protected by covering it with a bandage for the next week or so.   · Wash your hands with soap and warm water before and after caring for your wound. This helps prevent infection.  · Clean the wound gently with soap and warm water daily or as directed by your childs health  "care provider. Do not use iodine, alcohol, or other cleansers on the wound.  Gently pat it dry. Put on a new bandage, if needed. Do not reuse bandages.  · If the area gets wet, gently pat it dry with a clean cloth. Replace the wet bandage with a dry one.  · Check the wound daily for signs of infection. (These are listed under "When to seek medical advice" below.)  · You may shower and bathe as usual. Swimming is now permitted.  Follow-up care  Follow up with your healthcare provider as advised.  When to seek medical advice   Call your healthcare provider if any of the following occur:  · Wound reopens or bleeds  · Signs of an infection, such as:  ¨ Increasing redness or swelling around the wound  ¨ Increased warmth from the wound  ¨ Worsening pain  ¨ Red streaking lines away from the wound  ¨ Fluid draining from the wound  · Fever of 100.4°F (38°C) or higher, or as directed by your child's healthcare provider  Date Last Reviewed: 9/27/2015 © 2000-2017 [employer]. [address] [place], [place] [id number]. All rights reserved. This information is not intended as a substitute for professional medical care. Always follow your healthcare professional's instructions.    Patient's BP is elevated today. Patient did not take their  medication today.  No headache, Chest pain, or SOB. Patient has been advised to take their medicine and monitor the BP for the next few days.  If it stays elevated, patient should contact their PCP.           "

## 2019-03-07 ENCOUNTER — OFFICE VISIT (OUTPATIENT)
Dept: URGENT CARE | Facility: CLINIC | Age: 64
End: 2019-03-07
Payer: MEDICARE

## 2019-03-07 VITALS
OXYGEN SATURATION: 98 % | DIASTOLIC BLOOD PRESSURE: 83 MMHG | BODY MASS INDEX: 29.62 KG/M2 | HEIGHT: 69 IN | SYSTOLIC BLOOD PRESSURE: 135 MMHG | RESPIRATION RATE: 18 BRPM | TEMPERATURE: 98 F | WEIGHT: 200 LBS | HEART RATE: 81 BPM

## 2019-03-07 DIAGNOSIS — J02.9 SORE THROAT: ICD-10-CM

## 2019-03-07 DIAGNOSIS — J02.0 STREP PHARYNGITIS: Primary | ICD-10-CM

## 2019-03-07 LAB
CTP QC/QA: YES
S PYO RRNA THROAT QL PROBE: POSITIVE

## 2019-03-07 PROCEDURE — 87880 POCT RAPID STREP A: ICD-10-PCS | Mod: QW,S$GLB,, | Performed by: FAMILY MEDICINE

## 2019-03-07 PROCEDURE — 99214 PR OFFICE/OUTPT VISIT, EST, LEVL IV, 30-39 MIN: ICD-10-PCS | Mod: S$GLB,,, | Performed by: FAMILY MEDICINE

## 2019-03-07 PROCEDURE — 87880 STREP A ASSAY W/OPTIC: CPT | Mod: QW,S$GLB,, | Performed by: FAMILY MEDICINE

## 2019-03-07 PROCEDURE — 99214 OFFICE O/P EST MOD 30 MIN: CPT | Mod: S$GLB,,, | Performed by: FAMILY MEDICINE

## 2019-03-07 RX ORDER — CEPHALEXIN 500 MG/1
500 CAPSULE ORAL 4 TIMES DAILY
Qty: 40 CAPSULE | Refills: 0 | Status: ON HOLD | OUTPATIENT
Start: 2019-03-07 | End: 2019-03-12 | Stop reason: HOSPADM

## 2019-03-07 NOTE — PROGRESS NOTES
"Subjective:       Patient ID: Richard M Pipkins is a 63 y.o. male.    Vitals:  height is 5' 9" (1.753 m) and weight is 90.7 kg (200 lb). His temperature is 98.4 °F (36.9 °C). His blood pressure is 135/83 and his pulse is 81. His respiration is 18 and oxygen saturation is 98%.     Chief Complaint: Sore Throat    63-year-old male, status post CVA with residual hemiparesis, hypertension, diabetes, history of seizures.  Two day history of sore throat and decreased appetite..  No fever.  Taking fluids okay.  Has tolerated cephalexin in the past.      Sore Throat    This is a new problem. The current episode started in the past 7 days. The problem has been unchanged. Neither side of throat is experiencing more pain than the other. There has been no fever. The pain is at a severity of 8/10. The pain is moderate. Associated symptoms include trouble swallowing. Pertinent negatives include no congestion, coughing, ear pain, shortness of breath, stridor or vomiting. Treatments tried: chloraseptic spray halls. The treatment provided mild relief.       Constitution: Negative for chills, sweating, fatigue and fever.   HENT: Positive for sore throat and trouble swallowing. Negative for ear pain, congestion, sinus pain, sinus pressure and voice change.    Neck: Negative for painful lymph nodes.   Eyes: Negative for eye redness.   Respiratory: Negative for chest tightness, cough, sputum production, bloody sputum, COPD, shortness of breath, stridor, wheezing and asthma.    Gastrointestinal: Negative for nausea and vomiting.   Musculoskeletal: Negative for muscle ache.   Skin: Negative for rash.   Allergic/Immunologic: Negative for seasonal allergies and asthma.   Hematologic/Lymphatic: Negative for swollen lymph nodes.       Objective:      Physical Exam   Constitutional: He appears well-developed and well-nourished. He is cooperative.  Non-toxic appearance. He does not appear ill. No distress.   HENT:   Head: Normocephalic and " atraumatic.   Right Ear: Hearing, tympanic membrane, external ear and ear canal normal.   Left Ear: Hearing, tympanic membrane, external ear and ear canal normal.   Nose: Nose normal. No mucosal edema, rhinorrhea or nasal deformity. No epistaxis. Right sinus exhibits no maxillary sinus tenderness and no frontal sinus tenderness. Left sinus exhibits no maxillary sinus tenderness and no frontal sinus tenderness.   Mouth/Throat: Uvula is midline, oropharynx is clear and moist and mucous membranes are normal. No trismus in the jaw. Normal dentition. No uvula swelling. No oropharyngeal exudate or posterior oropharyngeal erythema.   Red pharynx without exudate.   Eyes: Conjunctivae and lids are normal. No scleral icterus.   Sclera clear bilat   Neck: Trachea normal, full passive range of motion without pain and phonation normal. Neck supple.   Cardiovascular: Normal rate, regular rhythm, normal heart sounds, intact distal pulses and normal pulses.   Pulmonary/Chest: Effort normal and breath sounds normal. No stridor. No respiratory distress. He has no wheezes. He has no rales.   Abdominal: Soft. Normal appearance and bowel sounds are normal. He exhibits no distension. There is no tenderness.   Musculoskeletal: He exhibits no deformity.   Known hemiparesis   Neurological: He is alert.   Skin: Skin is warm, dry and intact. He is not diaphoretic. No pallor.   Psychiatric: He has a normal mood and affect. His speech is normal and behavior is normal. Judgment and thought content normal. Cognition and memory are normal.   Nursing note and vitals reviewed.        Results for orders placed or performed in visit on 03/07/19   POCT rapid strep A   Result Value Ref Range    Rapid Strep A Screen Positive (A) Negative     Acceptable Yes      Assessment:       1. Strep pharyngitis    2. Sore throat        Plan:         Strep pharyngitis    Sore throat  -     POCT rapid strep A    Other orders  -     cephALEXin (KEFLEX)  500 MG capsule; Take 1 capsule (500 mg total) by mouth 4 (four) times daily. for 10 days  Dispense: 40 capsule; Refill: 0    Make sure that you follow up with your primary care doctor in the next 2-5 days if needed .  Return to the Urgent Care if signs or symptoms change and certainly if you have worsening symptoms go to the nearest emergency department for further evaluation.

## 2019-03-07 NOTE — PATIENT INSTRUCTIONS
Pharyngitis: Strep (Confirmed)    You have had a positive test for strep throat. Strep throat is a contagious illness. It is spread by coughing, kissing or by touching others after touching your mouth or nose. Symptoms include throat pain that is worse with swallowing, aching all over, headache, and fever. It is treated with antibiotic medicine. This should help you start to feel better in 1 to 2 days.  Home care  · Rest at home. Drink plenty of fluids to you won't get dehydrated.  · No work or school for the first 2 days of taking the antibiotics. After this time, you will not be contagious. You can then return to school or work if you are feeling better.   · Take antibiotic medicine for the full 10 days, even if you feel better. This is very important to ensure the infection is treated. It is also important to prevent medicine-resistant germs from developing. If you were given an antibiotic shot, you don't need any more antibiotics.  · You may use acetaminophen or ibuprofen to control pain or fever, unless another medicine was prescribed for this. Talk with your doctor before taking these medicines if you have chronic liver or kidney disease. Also talk with your doctor if you have had a stomach ulcer or GI bleeding.  · Throat lozenges or sprays help reduce pain. Gargling with warm saltwater will also reduce throat pain. Dissolve 1/2 teaspoon of salt in 1 glass of warm water. This may be useful just before meals.   · Soft foods are OK. Avoid salty or spicy foods.  Follow-up care  Follow up with your healthcare provider or our staff if you don't get better over the next week.  When to seek medical advice  Call your healthcare provider right away if any of these occur:  · Fever of 100.4ºF (38ºC) or higher, or as directed by your healthcare provider  · New or worsening ear pain, sinus pain, or headache  · Painful lumps in the back of neck  · Stiff neck  · Lymph nodes getting larger or becoming soft in the  middle  · You can't swallow liquids or you can't open your mouth wide because of throat pain  · Signs of dehydration. These include very dark urine or no urine, sunken eyes, and dizziness.  · Trouble breathing or noisy breathing  · Muffled voice  · Rash  Date Last Reviewed: 4/13/2015  © 2131-0209 [employer]. [address] [place] [address], [place] [address]. All rights reserved. This information is not intended as a substitute for professional medical care. Always follow your healthcare professional's instructions.      Make sure that you follow up with your primary care doctor in the next 2-5 days if needed .  Return to the Urgent Care if signs or symptoms change and certainly if you have worsening symptoms go to the nearest emergency department for further evaluation.

## 2019-03-09 ENCOUNTER — HOSPITAL ENCOUNTER (OUTPATIENT)
Facility: HOSPITAL | Age: 64
Discharge: HOME | End: 2019-03-12
Attending: EMERGENCY MEDICINE | Admitting: EMERGENCY MEDICINE
Payer: MEDICARE

## 2019-03-09 DIAGNOSIS — E11.9 TYPE 2 DIABETES MELLITUS WITHOUT COMPLICATION, WITHOUT LONG-TERM CURRENT USE OF INSULIN: ICD-10-CM

## 2019-03-09 DIAGNOSIS — I10 ESSENTIAL HYPERTENSION: ICD-10-CM

## 2019-03-09 DIAGNOSIS — Z86.73 HISTORY OF CVA (CEREBROVASCULAR ACCIDENT): ICD-10-CM

## 2019-03-09 DIAGNOSIS — R13.10 DYSPHAGIA, UNSPECIFIED TYPE: Primary | ICD-10-CM

## 2019-03-09 DIAGNOSIS — R13.14 PHARYNGOESOPHAGEAL DYSPHAGIA: ICD-10-CM

## 2019-03-09 DIAGNOSIS — R41.82 ALTERED MENTAL STATUS: ICD-10-CM

## 2019-03-09 DIAGNOSIS — R56.9 SEIZURE: ICD-10-CM

## 2019-03-09 LAB
ALBUMIN SERPL BCP-MCNC: 3.4 G/DL
ALP SERPL-CCNC: 80 U/L
ALT SERPL W/O P-5'-P-CCNC: 24 U/L
ANION GAP SERPL CALC-SCNC: 8 MMOL/L
AST SERPL-CCNC: 20 U/L
BASOPHILS # BLD AUTO: 0.01 K/UL
BASOPHILS NFR BLD: 0.1 %
BILIRUB SERPL-MCNC: 0.5 MG/DL
BILIRUB UR QL STRIP: NEGATIVE
BNP SERPL-MCNC: 14 PG/ML
BUN SERPL-MCNC: 25 MG/DL
CALCIUM SERPL-MCNC: 9.9 MG/DL
CHLORIDE SERPL-SCNC: 106 MMOL/L
CLARITY UR: CLEAR
CO2 SERPL-SCNC: 30 MMOL/L
COLOR UR: YELLOW
CREAT SERPL-MCNC: 1.4 MG/DL
DIFFERENTIAL METHOD: ABNORMAL
EOSINOPHIL # BLD AUTO: 0.2 K/UL
EOSINOPHIL NFR BLD: 1.7 %
ERYTHROCYTE [DISTWIDTH] IN BLOOD BY AUTOMATED COUNT: 15.1 %
EST. GFR  (AFRICAN AMERICAN): >60 ML/MIN/1.73 M^2
EST. GFR  (NON AFRICAN AMERICAN): 53 ML/MIN/1.73 M^2
GLUCOSE SERPL-MCNC: 92 MG/DL
GLUCOSE UR QL STRIP: NEGATIVE
HCT VFR BLD AUTO: 41.9 %
HGB BLD-MCNC: 13.6 G/DL
HGB UR QL STRIP: NEGATIVE
KETONES UR QL STRIP: ABNORMAL
LACTATE SERPL-SCNC: 1.5 MMOL/L
LEUKOCYTE ESTERASE UR QL STRIP: NEGATIVE
LIPASE SERPL-CCNC: 26 U/L
LYMPHOCYTES # BLD AUTO: 2.7 K/UL
LYMPHOCYTES NFR BLD: 28.5 %
MAGNESIUM SERPL-MCNC: 2.5 MG/DL
MCH RBC QN AUTO: 30.2 PG
MCHC RBC AUTO-ENTMCNC: 32.5 G/DL
MCV RBC AUTO: 93 FL
MONOCYTES # BLD AUTO: 0.5 K/UL
MONOCYTES NFR BLD: 4.8 %
NEUTROPHILS # BLD AUTO: 6.1 K/UL
NEUTROPHILS NFR BLD: 64.9 %
NITRITE UR QL STRIP: NEGATIVE
PH UR STRIP: 7 [PH] (ref 5–8)
PLATELET # BLD AUTO: 388 K/UL
PMV BLD AUTO: 11.4 FL
POCT GLUCOSE: 74 MG/DL (ref 70–110)
POCT GLUCOSE: 90 MG/DL (ref 70–110)
POTASSIUM SERPL-SCNC: 3.8 MMOL/L
PROT SERPL-MCNC: 7.5 G/DL
PROT UR QL STRIP: NEGATIVE
RBC # BLD AUTO: 4.51 M/UL
SODIUM SERPL-SCNC: 144 MMOL/L
SP GR UR STRIP: 1.03 (ref 1–1.03)
TROPONIN I SERPL DL<=0.01 NG/ML-MCNC: <0.006 NG/ML
URN SPEC COLLECT METH UR: ABNORMAL
UROBILINOGEN UR STRIP-ACNC: NEGATIVE EU/DL
WBC # BLD AUTO: 9.38 K/UL

## 2019-03-09 PROCEDURE — 93010 ELECTROCARDIOGRAM REPORT: CPT | Mod: ,,, | Performed by: INTERNAL MEDICINE

## 2019-03-09 PROCEDURE — 96361 HYDRATE IV INFUSION ADD-ON: CPT | Mod: 59

## 2019-03-09 PROCEDURE — 93005 ELECTROCARDIOGRAM TRACING: CPT

## 2019-03-09 PROCEDURE — 25000003 PHARM REV CODE 250: Performed by: NURSE PRACTITIONER

## 2019-03-09 PROCEDURE — 80053 COMPREHEN METABOLIC PANEL: CPT

## 2019-03-09 PROCEDURE — 83735 ASSAY OF MAGNESIUM: CPT

## 2019-03-09 PROCEDURE — 63600175 PHARM REV CODE 636 W HCPCS: Performed by: NURSE PRACTITIONER

## 2019-03-09 PROCEDURE — G0378 HOSPITAL OBSERVATION PER HR: HCPCS

## 2019-03-09 PROCEDURE — 93010 EKG 12-LEAD: ICD-10-PCS | Mod: ,,, | Performed by: INTERNAL MEDICINE

## 2019-03-09 PROCEDURE — 25500020 PHARM REV CODE 255: Performed by: EMERGENCY MEDICINE

## 2019-03-09 PROCEDURE — 81003 URINALYSIS AUTO W/O SCOPE: CPT

## 2019-03-09 PROCEDURE — 96375 TX/PRO/DX INJ NEW DRUG ADDON: CPT | Mod: 59

## 2019-03-09 PROCEDURE — 85025 COMPLETE CBC W/AUTO DIFF WBC: CPT

## 2019-03-09 PROCEDURE — 84484 ASSAY OF TROPONIN QUANT: CPT

## 2019-03-09 PROCEDURE — 96376 TX/PRO/DX INJ SAME DRUG ADON: CPT | Mod: 59

## 2019-03-09 PROCEDURE — 87040 BLOOD CULTURE FOR BACTERIA: CPT

## 2019-03-09 PROCEDURE — 83880 ASSAY OF NATRIURETIC PEPTIDE: CPT

## 2019-03-09 PROCEDURE — 96361 HYDRATE IV INFUSION ADD-ON: CPT

## 2019-03-09 PROCEDURE — 99285 EMERGENCY DEPT VISIT HI MDM: CPT | Mod: 25

## 2019-03-09 PROCEDURE — 83605 ASSAY OF LACTIC ACID: CPT

## 2019-03-09 PROCEDURE — 25000003 PHARM REV CODE 250: Performed by: EMERGENCY MEDICINE

## 2019-03-09 PROCEDURE — 83690 ASSAY OF LIPASE: CPT

## 2019-03-09 RX ORDER — HYDRALAZINE HYDROCHLORIDE 20 MG/ML
10 INJECTION INTRAMUSCULAR; INTRAVENOUS ONCE
Status: COMPLETED | OUTPATIENT
Start: 2019-03-09 | End: 2019-03-09

## 2019-03-09 RX ORDER — HYDRALAZINE HYDROCHLORIDE 20 MG/ML
10 INJECTION INTRAMUSCULAR; INTRAVENOUS EVERY 6 HOURS PRN
Status: DISCONTINUED | OUTPATIENT
Start: 2019-03-09 | End: 2019-03-12 | Stop reason: HOSPADM

## 2019-03-09 RX ORDER — GLUCAGON 1 MG
1 KIT INJECTION
Status: DISCONTINUED | OUTPATIENT
Start: 2019-03-09 | End: 2019-03-12 | Stop reason: HOSPADM

## 2019-03-09 RX ORDER — CLINDAMYCIN PHOSPHATE 900 MG/50ML
900 INJECTION, SOLUTION INTRAVENOUS
Status: DISCONTINUED | OUTPATIENT
Start: 2019-03-09 | End: 2019-03-12 | Stop reason: HOSPADM

## 2019-03-09 RX ORDER — LEVETIRACETAM 5 MG/ML
500 INJECTION INTRAVASCULAR EVERY 12 HOURS
Status: DISCONTINUED | OUTPATIENT
Start: 2019-03-09 | End: 2019-03-12 | Stop reason: HOSPADM

## 2019-03-09 RX ORDER — SODIUM CHLORIDE 0.9 % (FLUSH) 0.9 %
5 SYRINGE (ML) INJECTION
Status: DISCONTINUED | OUTPATIENT
Start: 2019-03-09 | End: 2019-03-12 | Stop reason: HOSPADM

## 2019-03-09 RX ORDER — ASPIRIN 81 MG/1
81 TABLET ORAL DAILY
COMMUNITY

## 2019-03-09 RX ORDER — INSULIN ASPART 100 [IU]/ML
0-5 INJECTION, SOLUTION INTRAVENOUS; SUBCUTANEOUS
Status: DISCONTINUED | OUTPATIENT
Start: 2019-03-09 | End: 2019-03-12 | Stop reason: HOSPADM

## 2019-03-09 RX ORDER — SODIUM CHLORIDE 9 MG/ML
125 INJECTION, SOLUTION INTRAVENOUS ONCE
Status: COMPLETED | OUTPATIENT
Start: 2019-03-09 | End: 2019-03-09

## 2019-03-09 RX ORDER — DEXTROSE MONOHYDRATE, SODIUM CHLORIDE, AND POTASSIUM CHLORIDE 50; 1.49; 9 G/1000ML; G/1000ML; G/1000ML
INJECTION, SOLUTION INTRAVENOUS CONTINUOUS
Status: DISCONTINUED | OUTPATIENT
Start: 2019-03-09 | End: 2019-03-11

## 2019-03-09 RX ORDER — AMOXICILLIN 500 MG
CAPSULE ORAL DAILY
COMMUNITY

## 2019-03-09 RX ORDER — SODIUM CHLORIDE AND POTASSIUM CHLORIDE 150; 900 MG/100ML; MG/100ML
INJECTION, SOLUTION INTRAVENOUS CONTINUOUS
Status: DISCONTINUED | OUTPATIENT
Start: 2019-03-09 | End: 2019-03-09

## 2019-03-09 RX ORDER — IBUPROFEN 200 MG
16 TABLET ORAL
Status: DISCONTINUED | OUTPATIENT
Start: 2019-03-09 | End: 2019-03-12 | Stop reason: HOSPADM

## 2019-03-09 RX ORDER — ASPIRIN 300 MG/1
150 SUPPOSITORY RECTAL ONCE
Status: COMPLETED | OUTPATIENT
Start: 2019-03-09 | End: 2019-03-09

## 2019-03-09 RX ORDER — IBUPROFEN 200 MG
24 TABLET ORAL
Status: DISCONTINUED | OUTPATIENT
Start: 2019-03-09 | End: 2019-03-12 | Stop reason: HOSPADM

## 2019-03-09 RX ADMIN — IOHEXOL 75 ML: 350 INJECTION, SOLUTION INTRAVENOUS at 01:03

## 2019-03-09 RX ADMIN — ASPIRIN 150 MG: 300 SUPPOSITORY RECTAL at 08:03

## 2019-03-09 RX ADMIN — POTASSIUM CHLORIDE, DEXTROSE MONOHYDRATE AND SODIUM CHLORIDE: 150; 5; 900 INJECTION, SOLUTION INTRAVENOUS at 05:03

## 2019-03-09 RX ADMIN — SODIUM CHLORIDE 125 ML/HR: 0.9 INJECTION, SOLUTION INTRAVENOUS at 02:03

## 2019-03-09 RX ADMIN — LEVETIRACETAM 500 MG: 5 INJECTION INTRAVENOUS at 06:03

## 2019-03-09 RX ADMIN — SODIUM CHLORIDE 1000 ML: 0.9 INJECTION, SOLUTION INTRAVENOUS at 01:03

## 2019-03-09 RX ADMIN — HYDRALAZINE HYDROCHLORIDE 10 MG: 20 INJECTION INTRAMUSCULAR; INTRAVENOUS at 05:03

## 2019-03-09 NOTE — ASSESSMENT & PLAN NOTE
"Followed by [facility]. Last seizure "few years ago." Start IV keppra 500 mg BID.     "

## 2019-03-09 NOTE — ASSESSMENT & PLAN NOTE
Hemoglobin A1C   Date Value Ref Range Status   07/21/2006 9.4 (H) 4.5 - 6.2 % Final   Obtain HgbA1c. Diebetic diet once able to eat. IVF D5NS with 20 meq K for now.

## 2019-03-09 NOTE — ASSESSMENT & PLAN NOTE
Unclear etiology. Pharynx and tonsils no exudate or erythema. CT neck no acute findings.  Currently patient denies any pain. Obtain MRI to rule out stroke. NPO, IVF. GI consulted in ED. MBSS ordered in ED. Will continue IV

## 2019-03-09 NOTE — ED TRIAGE NOTES
Pt arrives to er via ems on stretcher from home with wife and caregiver. Pt alert able to states name. Pt wife states hx x2 stroke and brain tumors removed. Pt wife states diffuculty swallowing since wednedsay, took to  md on  and was prescribed abx. First dose abx was on Wednesday. Reported pt screaming help me at night. Pt not able to voice concerns

## 2019-03-09 NOTE — ED PROVIDER NOTES
Encounter Date: 3/9/2019    SCRIBE #1 NOTE: I, Jaqueline Nixon , am scribing for, and in the presence of,  Kelvin Coughlin MD. I have scribed the following portions of the note - Other sections scribed: HPI, ROS .       History     Chief Complaint   Patient presents with    Dysphagia     difficulty swallowing and sore throat since Wednesday; hx of multiple strokes with right side deficits and wheelchair     CC: Dysphagia     HPI: 64 y/o M who  has a past medical history of Diabetes mellitus, Hypertension, Seizures, and Stroke presents to the ED with acute onset of dysphagia,  throat pain, voice change, and neck pain which began 3x days ago. Pain is worse upon palpation of the neck. Pt was seen at an Urgent Care and was prescribed antibiotics with no relief. Her wife states the pt has been eating and drinking less and the caretaker reports one episode of vomiting and increase spiting since the onset of symptoms. She describes the vomit as watery in sensation. This wife states the pt's urine odor is extremely strong and states the pt is not in his normal state. She describing him as significantly more lethargic and quieter than normal. Pt used to be able to walk with assistance via a walker but is now unable to walk at all without the help of two people since the onset of current symptoms. His wife states that the pt will not admit to pain unless the pain is unbearable. No fever/chills, nausea, cough/sob, headache/dizziness, abnormal bowels.         The history is provided by the patient, a caregiver and the spouse. No  was used.     Review of patient's allergies indicates:   Allergen Reactions    Penicillins      Past Medical History:   Diagnosis Date    Diabetes mellitus     Hypertension     Seizures     Stroke      Past Surgical History:   Procedure Laterality Date    BRAIN SURGERY       History reviewed. No pertinent family history.  Social History     Tobacco Use    Smoking status: Never  Smoker   Substance Use Topics    Alcohol use: No     Alcohol/week: 0.0 oz    Drug use: No     Review of Systems   Constitutional: Negative for appetite change and fever.   HENT: Positive for trouble swallowing and voice change. Negative for rhinorrhea and sore throat.    Eyes: Negative for visual disturbance.   Respiratory: Negative for cough and shortness of breath.    Cardiovascular: Negative for chest pain.   Gastrointestinal: Negative for abdominal pain.   Genitourinary: Negative for dysuria.   Musculoskeletal: Positive for neck pain. Negative for gait problem.   Skin: Negative for rash.   Neurological: Negative for syncope.       Physical Exam     Initial Vitals [03/09/19 1058]   BP Pulse Resp Temp SpO2   (!) 148/88 80 20 99.4 °F (37.4 °C) 98 %      MAP       --         Physical Exam    Nursing note and vitals reviewed.  Constitutional: He appears well-developed and well-nourished. No distress.   HENT:   Head: Atraumatic.   Mouth/Throat: Oropharynx is clear and moist. No oropharyngeal exudate.   Eyes: EOM are normal. Pupils are equal, round, and reactive to light.   Neck: Normal range of motion. Neck supple. No JVD present.   Cardiovascular: Normal rate, regular rhythm, normal heart sounds and intact distal pulses. Exam reveals no gallop and no friction rub.    No murmur heard.  Pulmonary/Chest: Breath sounds normal. No respiratory distress. He has no wheezes. He has no rhonchi. He has no rales. He exhibits no tenderness.   Abdominal: Soft. Bowel sounds are normal. He exhibits no distension. There is no tenderness. There is no rebound and no guarding.   Musculoskeletal: Normal range of motion.   Lymphadenopathy:     He has no cervical adenopathy.   Neurological:   Patient has mild lethargy.  Is able to be aroused with like physical stimulation.  Able to sit up and hold conversation once aroused but then falls back asleep.  Mild left-sided weakness.   Skin: Skin is warm and dry. No rash noted.   Psychiatric:  He has a normal mood and affect. Thought content normal.         ED Course   Procedures  Labs Reviewed   CBC W/ AUTO DIFFERENTIAL - Abnormal; Notable for the following components:       Result Value    RBC 4.51 (*)     Hemoglobin 13.6 (*)     RDW 15.1 (*)     Platelets 388 (*)     All other components within normal limits   COMPREHENSIVE METABOLIC PANEL - Abnormal; Notable for the following components:    CO2 30 (*)     BUN, Bld 25 (*)     Albumin 3.4 (*)     eGFR if non  53 (*)     All other components within normal limits   CULTURE, BLOOD   CULTURE, BLOOD   LACTIC ACID, PLASMA   TROPONIN I   B-TYPE NATRIURETIC PEPTIDE   LIPASE   MAGNESIUM   URINALYSIS, REFLEX TO URINE CULTURE        ECG Results          EKG 12-lead (In process)  Result time 03/09/19 14:27:27    In process by Interface, Lab In [facility] (03/09/19 14:27:27)                 Narrative:    Test Reason : R41.82,    Vent. Rate : 075 BPM     Atrial Rate : 075 BPM     P-R Int : 162 ms          QRS Dur : 096 ms      QT Int : 364 ms       P-R-T Axes : 081 068 019 degrees     QTc Int : 406 ms    Normal sinus rhythm  Minimal voltage criteria for LVH, may be normal variant  Nonspecific T wave abnormality  Abnormal ECG  When compared with ECG of 04-JUL-2016 15:22,  No significant change was found    Referred By: [email or web address]   SELF           Confirmed By:                   In process by Interface, Lab In [facility] (03/09/19 14:23:58)                 Narrative:    Test Reason : R41.82,    Vent. Rate : 075 BPM     Atrial Rate : 075 BPM     P-R Int : 162 ms          QRS Dur : 096 ms      QT Int : 364 ms       P-R-T Axes : 081 068 019 degrees     QTc Int : 406 ms    Normal sinus rhythm  Minimal voltage criteria for LVH, may be normal variant  Nonspecific T wave abnormality  Abnormal ECG  When compared with ECG of 04-JUL-2016 15:22,  No significant change was found    Referred By: [email or web address]   SELF           Confirmed By:                             Imaging  Results          CT Head Without Contrast (Final result)  Result time 03/09/19 13:36:39    Final result by Gallo Orellana MD (03/09/19 13:36:39)                 Impression:      Stable exam with chronic schema changes as above.    No acute intracranial findings.      Electronically signed by: Gallo Orellana MD  Date:    03/09/2019  Time:    13:36             Narrative:    EXAMINATION:  CT HEAD WITHOUT CONTRAST    CLINICAL HISTORY:  Confusion/delirium, altered LOC, unexplained;    TECHNIQUE:  Low dose axial CT images obtained throughout the head without intravenous contrast. Sagittal and coronal reconstructions were performed.    COMPARISON:  04/22/2017    FINDINGS:  Intracranial compartment:    There is mild generalized cerebral and cerebellar atrophy with associated prominence of the ventricular system.  No extra-axial blood or fluid collections.    Nonspecific low-density changes noted throughout the supratentorial white matter, likely representing sequela of chronic microvascular ischemic disease.  Small foci of encephalomalacia again noted involving the bilateral frontal lobes and cerebellar hemispheres in keeping with prior infarction.  Small remote appearing lacunar type infarcts again noted involving the bilateral basal ganglia and thalami as well as the left gail.  No parenchymal mass, hemorrhage, edema or major vascular distribution infarct.    Skull/extracranial contents (limited evaluation): No fracture. Mucosal thickening noted in the visualized portions of the left maxillary sinus.                               CT Soft Tissue Neck With Contrast (Edited Result - FINAL)  Result time 03/09/19 13:45:13    Addendum 1 of 1 by Gallo Orellana MD (03/09/19 13:45:13)      There is some mild circumferential wall thickening in the cervical portion of the esophagus suggesting possible esophagitis.      Electronically signed by: Gallo Orellana MD  Date:    03/09/2019  Time:    13:45               Final  result by Gallo Orellana MD (03/09/19 13:41:52)                 Impression:      No acute findings.      Electronically signed by: Gallo Orellana MD  Date:    03/09/2019  Time:    13:41             Narrative:    EXAMINATION:  CT SOFT TISSUE NECK WITH CONTRAST    CLINICAL HISTORY:  neck pain, odynophagia;    TECHNIQUE:  Low dose axial images as well as sagittal and coronal reconstructions were performed from the skull base to the clavicles following the intravenous administration of 75 mL of Omnipaque 350.    COMPARISON:  None    FINDINGS:  The nasopharynx, oral pharynx, hypopharynx, larynx and visualized portion of the trachea are within normal limits.    The oral cavity and buccal space are within normal limits.    The bilateral parotid, submandibular and thyroid glands are within normal limits.    No evidence for adenopathy throughout the neck by size criteria.    Multilevel degenerative changes noted throughout the cervical spine.    Visualized lung apices are clear bilaterally.                               X-Ray Chest AP Portable (Final result)  Result time 03/09/19 13:19:33    Final result by Rangel Braden MD (03/09/19 13:19:33)                 Impression:      1. No acute cardiopulmonary process.      Electronically signed by: Rangel Braden MD  Date:    03/09/2019  Time:    13:19             Narrative:    EXAMINATION:  XR CHEST AP PORTABLE    CLINICAL HISTORY:  altered mental status;    TECHNIQUE:  Single frontal view of the chest was performed.    COMPARISON:  04/22/2017    FINDINGS:  The cardiomediastinal silhouette is not enlarged.  There is no pleural effusion.  The trachea is midline.  The lungs are symmetrically expanded bilaterally without evidence of acute parenchymal process. No large focal consolidation seen.  There is no pneumothorax.  The osseous structures are remarkable for degenerative changes..                                patient brought in by spouse and caregivers for   odynophagia.  Caregiver states that he has been able to tolerate solid food since Monday.  He went to an urgent care Wednesday and he had a positive strep test.  He has been on antibiotics for the past 2 days but they state he is getting worse and not better.  No fever.  No cough.  No headache. They state he has been having increasing lethargy.  They stopped giving his medications co some of them can cause sedation.  He has not had any medications today.  Patient had baseline is awake and alert and has no difficulty with speech or swallowing per family.  Patient appears mildly dysarthric without facial droop.  I had the patient drink a glass of water and he was able to drink 1/2 class with a some mild choking.  He then states he was unable to finish drinking a glass of water.  Patient has been able to drink fluids and eat applesauce and take medications but and able to tolerate solid foods.  No event specific to esophageal food bolus that is known.  Patient has completely normal oropharyngeal exam.  No erythema.  No lymphadenopathy.  I question whether the positive strep is a colonization rather than infection.  I do not see a reason why his symptoms would be worsening instead of improving if strep throat was cause.  Concern for possible extension of stroke with dysphagia.  Secondary concern for a dime aphasia due to retropharyngeal or problem at the epiglottic level.  CT scan of the neck performed that shows no abnormalities.  Chest x-ray and EKG showed no acute abnormalities.  Patient does appear mildly dry and has a minimal elevation of BUN but no significant electrolyte abnormalities.  Patient has no meningeal signs and denies headache. Benign abdominal exam. Will place in obs for gently hydration, further dysphagia evaluation and in able to swallow appropriately possible EGD.  Will terat with Rocephin for possible partially treated strep throat.              Scribe Attestation:   Scribe #1: I performed the  above scribed service and the documentation accurately describes the services I performed. I attest to the accuracy of the note.    Attending Attestation:           Physician Attestation for Scribe:  Physician Attestation Statement for Scribe #1: I, Kelvin Coughlin MD, reviewed documentation, as scribed by Jaqueline Nixon  in my presence, and it is both accurate and complete.                    Clinical Impression:       ICD-10-CM ICD-9-CM   1. Dysphagia, unspecified type R13.10 787.20   2. Altered mental status R41.82 780.97                                Kelvin Coughlin MD  03/09/19 1527

## 2019-03-09 NOTE — SUBJECTIVE & OBJECTIVE
Past Medical History:   Diagnosis Date    Diabetes mellitus     Hypertension     Seizures     Stroke        Past Surgical History:   Procedure Laterality Date    BRAIN SURGERY         Review of patient's allergies indicates:   Allergen Reactions    Penicillins        No current facility-administered medications on file prior to encounter.      Current Outpatient Medications on File Prior to Encounter   Medication Sig    amLODIPine (NORVASC) 5 MG tablet     aspirin (ECOTRIN) 81 MG EC tablet Take 81 mg by mouth once daily.    atorvastatin (LIPITOR) 80 MG tablet     cephALEXin (KEFLEX) 500 MG capsule Take 1 capsule (500 mg total) by mouth 4 (four) times daily. for 10 days    clopidogrel (PLAVIX) 75 mg tablet     ergocalciferol (VITAMIN D2) 50,000 unit Cap Take 50,000 Units by mouth every 30 days.     fish oil-omega-3 fatty acids 300-1,000 mg capsule Take by mouth once daily.    levETIRAcetam (KEPPRA) 500 MG Tab     metoprolol succinate (TOPROL-XL) 25 MG 24 hr tablet 12.5 mg.     multivitamin capsule Take 1 capsule by mouth once daily.    phenol/glycerin (CHLORASEPTIC MAX SORE THROAT MM) by Mucous Membrane route.    RANEXA 500 mg Tb12 Take 500 mg by mouth 2 (two) times daily.     sertraline (ZOLOFT) 100 MG tablet     VICTOZA 3-ANATLOIY 0.6 mg/0.1 mL (18 mg/3 mL) PnIj Inject 1.2 mg into the skin every morning.     ACCU-CHEK SHAN PLUS TEST STRP Strp     bethanechol (URECHOLINE) 25 MG Tab Take 25 mg by mouth once daily.     incontinence pad, liner, disp Pads     lamotrigine (LAMICTAL) 100 MG tablet     lamotrigine (LAMICTAL) 25 MG tablet     LOVAZA 1 gram capsule Take 1 g by mouth 2 (two) times daily.     MULTIVIT-IRON-MIN-FOLIC ACID 3,500-18-0.4 UNIT-MG-MG ORAL CHEW Take by mouth.    mupirocin (BACTROBAN) 2 % ointment Apply topically once daily.    oxybutynin (DITROPAN) 5 MG Tab Take 5 mg by mouth 3 (three) times daily.     pravastatin (PRAVACHOL) 40 MG tablet Take 40 mg by mouth once daily.      QUEtiapine (SEROQUEL) 25 MG Tab     solifenacin (VESICARE) 5 MG tablet Take 10 mg by mouth once daily.    tamsulosin (FLOMAX) 0.4 mg Cp24 Take 0.4 mg by mouth once daily.      Family History     None        Tobacco Use    Smoking status: Never Smoker   Substance and Sexual Activity    Alcohol use: No     Alcohol/week: 0.0 oz    Drug use: No    Sexual activity: Not on file     Review of Systems   Constitutional: Positive for activity change and appetite change. Negative for chills, fatigue and fever.   HENT: Positive for drooling, trouble swallowing and voice change.    Eyes: Negative.    Respiratory: Negative.    Cardiovascular: Negative.    Gastrointestinal: Negative.    Endocrine: Negative.    Genitourinary: Negative.    Musculoskeletal: Negative.    Skin: Negative.    Neurological: Positive for weakness (left side from previous stroke ).        Left facial droop from previous stroke    Psychiatric/Behavioral: Negative.      Objective:     Vital Signs (Most Recent):  Temp: 98.3 °F (36.8 °C) (03/09/19 1619)  Pulse: 86 (03/09/19 1619)  Resp: 18 (03/09/19 1619)  BP: (!) 193/98 (03/09/19 1619)  SpO2: 95 % (03/09/19 1619) Vital Signs (24h Range):  Temp:  [98.3 °F (36.8 °C)-99.4 °F (37.4 °C)] 98.3 °F (36.8 °C)  Pulse:  [75-86] 86  Resp:  [18-20] 18  SpO2:  [95 %-100 %] 95 %  BP: (148-193)/(86-98) 193/98     Weight: 77 kg (169 lb 12.8 oz)  Body mass index is 25.08 kg/m².    Physical Exam   Constitutional: He is oriented to person, place, and time. He appears well-developed and well-nourished. No distress.   HENT:   Head: Atraumatic.   Mouth/Throat: Oropharynx is clear and moist. No oropharyngeal exudate.   Eyes: EOM are normal.   Neck: Neck supple.   Cardiovascular: Normal rate and regular rhythm.   Pulmonary/Chest: Effort normal and breath sounds normal.   Abdominal: Soft. Bowel sounds are normal. He exhibits no distension.   Musculoskeletal: Normal range of motion. He exhibits no edema or deformity.    Neurological: He is alert and oriented to person, place, and time. No cranial nerve deficit.   Skin: Skin is warm and dry.   Psychiatric: He has a normal mood and affect.       Significant Labs: All pertinent labs within the past 24 hours have been reviewed.    Significant Imaging: I have reviewed and interpreted all pertinent imaging results/findings within the past 24 hours.

## 2019-03-09 NOTE — H&P
"[facility] Hospital Medicine  History & Physical    Patient Name: Richard M Pipkins  MRN: [id number]  Admission Date: 3/9/2019  Attending Physician: Veronica Blakely MD   Primary Care Provider: Annalee Campos MD         Patient information was obtained from patient and ER records.     Subjective:     Principal Problem:Dysphagia    Chief Complaint:   Chief Complaint   Patient presents with    Dysphagia     difficulty swallowing and sore throat since Wednesday; hx of multiple strokes with right side deficits and wheelchair        HPI: Mr. Pipkins is a 64 yo AAM with significant history for hypertension, hyperlipidemia, DMT2, CKD stage 3, hx of pituitary tumor resection with pituitary adenoma managed per endocrinology, and hx CVA with residual left sided hemiparesis/cognitive impairment and seizure who came to hospital for dysphagia and odynophagia that started Wednesday. Patient went to see PCP for sore throat and trouble swallowing found to have positive pharyngitis -rapid strep A positive-discharge on Keflex 500 mg QID x 10 days. Wife reports been taking all medications up until today. Patient not able to tolerate liquid "spit up water today." Symptoms worst with palpation. Wife reports mental status at baseline but does have generalized weakness.   In ED, CT soft tissue next with contrast showed no acute findings. CT head chronic ischemia, remote lacunar infarcts bilateral basal ganglia/thalmi/left carey and encephalomalacia bilateral frontal lobes and cerebellar hemisphere. UA no evidence of infection.  GI consulted in ED. MBSS ordered in ED.     Past Medical History:   Diagnosis Date    Diabetes mellitus     Hypertension     Seizures     Stroke        Past Surgical History:   Procedure Laterality Date    BRAIN SURGERY         Review of patient's allergies indicates:   Allergen Reactions    Penicillins        No current facility-administered medications on file prior to " encounter.      Current Outpatient Medications on File Prior to Encounter   Medication Sig    amLODIPine (NORVASC) 5 MG tablet     aspirin (ECOTRIN) 81 MG EC tablet Take 81 mg by mouth once daily.    atorvastatin (LIPITOR) 80 MG tablet     cephALEXin (KEFLEX) 500 MG capsule Take 1 capsule (500 mg total) by mouth 4 (four) times daily. for 10 days    clopidogrel (PLAVIX) 75 mg tablet     ergocalciferol (VITAMIN D2) 50,000 unit Cap Take 50,000 Units by mouth every 30 days.     fish oil-omega-3 fatty acids 300-1,000 mg capsule Take by mouth once daily.    levETIRAcetam (KEPPRA) 500 MG Tab     metoprolol succinate (TOPROL-XL) 25 MG 24 hr tablet 12.5 mg.     multivitamin capsule Take 1 capsule by mouth once daily.    phenol/glycerin (CHLORASEPTIC MAX SORE THROAT MM) by Mucous Membrane route.    RANEXA 500 mg Tb12 Take 500 mg by mouth 2 (two) times daily.     sertraline (ZOLOFT) 100 MG tablet     VICTOZA 3-ANATOLIY 0.6 mg/0.1 mL (18 mg/3 mL) PnIj Inject 1.2 mg into the skin every morning.     ACCU-CHEK SHAN PLUS TEST STRP Strp     bethanechol (URECHOLINE) 25 MG Tab Take 25 mg by mouth once daily.     incontinence pad, liner, disp Pads     lamotrigine (LAMICTAL) 100 MG tablet     lamotrigine (LAMICTAL) 25 MG tablet     LOVAZA 1 gram capsule Take 1 g by mouth 2 (two) times daily.     MULTIVIT-IRON-MIN-FOLIC ACID 3,500-18-0.4 UNIT-MG-MG ORAL CHEW Take by mouth.    mupirocin (BACTROBAN) 2 % ointment Apply topically once daily.    oxybutynin (DITROPAN) 5 MG Tab Take 5 mg by mouth 3 (three) times daily.     pravastatin (PRAVACHOL) 40 MG tablet Take 40 mg by mouth once daily.     QUEtiapine (SEROQUEL) 25 MG Tab     solifenacin (VESICARE) 5 MG tablet Take 10 mg by mouth once daily.    tamsulosin (FLOMAX) 0.4 mg Cp24 Take 0.4 mg by mouth once daily.      Family History     None        Tobacco Use    Smoking status: Never Smoker   Substance and Sexual Activity    Alcohol use: No     Alcohol/week: 0.0 oz     Drug use: No    Sexual activity: Not on file     Review of Systems   Constitutional: Positive for activity change and appetite change. Negative for chills, fatigue and fever.   HENT: Positive for drooling, trouble swallowing and voice change.    Eyes: Negative.    Respiratory: Negative.    Cardiovascular: Negative.    Gastrointestinal: Negative.    Endocrine: Negative.    Genitourinary: Negative.    Musculoskeletal: Negative.    Skin: Negative.    Neurological: Positive for weakness (left side from previous stroke ).        Left facial droop from previous stroke    Psychiatric/Behavioral: Negative.      Objective:     Vital Signs (Most Recent):  Temp: 98.3 °F (36.8 °C) (03/09/19 1619)  Pulse: 86 (03/09/19 1619)  Resp: 18 (03/09/19 1619)  BP: (!) 193/98 (03/09/19 1619)  SpO2: 95 % (03/09/19 1619) Vital Signs (24h Range):  Temp:  [98.3 °F (36.8 °C)-99.4 °F (37.4 °C)] 98.3 °F (36.8 °C)  Pulse:  [75-86] 86  Resp:  [18-20] 18  SpO2:  [95 %-100 %] 95 %  BP: (148-193)/(86-98) 193/98     Weight: 77 kg (169 lb 12.8 oz)  Body mass index is 25.08 kg/m².    Physical Exam   Constitutional: He is oriented to person, place, and time. He appears well-developed and well-nourished. No distress.   HENT:   Head: Atraumatic.   Mouth/Throat: Oropharynx is clear and moist. No oropharyngeal exudate.   Eyes: EOM are normal.   Neck: Neck supple.   Cardiovascular: Normal rate and regular rhythm.   Pulmonary/Chest: Effort normal and breath sounds normal.   Abdominal: Soft. Bowel sounds are normal. He exhibits no distension.   Musculoskeletal: Normal range of motion. He exhibits no edema or deformity.   Neurological: He is alert and oriented to person, place, and time. No cranial nerve deficit.   Skin: Skin is warm and dry.   Psychiatric: He has a normal mood and affect.       Significant Labs: All pertinent labs within the past 24 hours have been reviewed.    Significant Imaging: I have reviewed and interpreted all pertinent imaging  "results/findings within the past 24 hours.    Assessment/Plan:     * Dysphagia, Odynophagia, Strep     Unclear etiology. Pharynx and tonsils no exudate or erythema. CT neck no acute findings.  Currently patient denies any pain. Obtain MRI to rule out stroke. NPO, IVF. GI consulted in ED. MBSS ordered in ED. Bedside swallow study.        Seizure    Followed by [facility]. Last seizure "few years ago." Start IV keppra 500 mg BID.        History of CVA (cerebrovascular accident)    Besides dysphagia no new neuro deficits. Hx of CVA >5 year ago with residual left sided hemiparesis/cognitive impairment. ASA supp and hold statin until able to take oral.        Type 2 diabetes mellitus, without long-term current use of insulin    Hemoglobin A1C   Date Value Ref Range Status   07/21/2006 9.4 (H) 4.5 - 6.2 % Final   Obtain HgbA1c. Diebetic diet once able to eat. IVF D5NS with 20 meq K for now.            VTE Risk Mitigation (From admission, onward)        Ordered     IP VTE LOW RISK PATIENT  Once      03/09/19 1603     Place NNAMDI hose  Until discontinued      03/09/19 1603             Ruth David NP  Department of Hospital Medicine   [facility]  "

## 2019-03-09 NOTE — ASSESSMENT & PLAN NOTE
Besides dysphagia no new neuro deficits. Hx of CVA >5 year ago with residual left sided hemiparesis/cognitive impairment. ASA supp and hold statin until able to take oral.

## 2019-03-09 NOTE — HPI
"Mr. Pipkins is a 64 yo AAM with significant history for hypertension, hyperlipidemia, DMT2, CKD stage 3, hx of pituitary tumor resection with pituitary adenoma managed per endocrinology, and hx CVA with residual left sided hemiparesis/cognitive impairment and seizure who came to hospital for dysphagia and odynophagia that started Wednesday. Patient went to see PCP for sore throat and trouble swallowing found to have positive pharyngitis -rapid strep A positive-discharge on Keflex 500 mg QID x 10 days. Wife reports been taking all medications up until today. Patient not able to tolerate liquid "spit up water today." Symptoms worst with palpation. Wife reports mental status at baseline but does have generalized weakness.   In ED, CT soft tissue next with contrast showed no acute findings. CT head chronic ischemia, remote lacunar infarcts bilateral basal ganglia/thalmi/left carey and encephalomalacia bilateral frontal lobes and cerebellar hemisphere. UA no evidence of infection.  GI consulted in ED. MBSS ordered in ED.   "

## 2019-03-10 LAB
ALBUMIN SERPL BCP-MCNC: 3.2 G/DL
ALP SERPL-CCNC: 69 U/L
ALT SERPL W/O P-5'-P-CCNC: 22 U/L
ANION GAP SERPL CALC-SCNC: 9 MMOL/L
AST SERPL-CCNC: 20 U/L
BASOPHILS # BLD AUTO: 0.02 K/UL
BASOPHILS NFR BLD: 0.3 %
BILIRUB SERPL-MCNC: 0.5 MG/DL
BUN SERPL-MCNC: 18 MG/DL
CALCIUM SERPL-MCNC: 9.4 MG/DL
CHLORIDE SERPL-SCNC: 109 MMOL/L
CO2 SERPL-SCNC: 25 MMOL/L
CREAT SERPL-MCNC: 1.1 MG/DL
DIFFERENTIAL METHOD: ABNORMAL
EOSINOPHIL # BLD AUTO: 0.3 K/UL
EOSINOPHIL NFR BLD: 3.6 %
ERYTHROCYTE [DISTWIDTH] IN BLOOD BY AUTOMATED COUNT: 15.1 %
EST. GFR  (AFRICAN AMERICAN): >60 ML/MIN/1.73 M^2
EST. GFR  (NON AFRICAN AMERICAN): >60 ML/MIN/1.73 M^2
GLUCOSE SERPL-MCNC: 118 MG/DL
HCT VFR BLD AUTO: 44.4 %
HGB BLD-MCNC: 14.1 G/DL
LYMPHOCYTES # BLD AUTO: 2.8 K/UL
LYMPHOCYTES NFR BLD: 36.3 %
MCH RBC QN AUTO: 29.2 PG
MCHC RBC AUTO-ENTMCNC: 31.8 G/DL
MCV RBC AUTO: 92 FL
MONOCYTES # BLD AUTO: 0.7 K/UL
MONOCYTES NFR BLD: 8.6 %
NEUTROPHILS # BLD AUTO: 3.9 K/UL
NEUTROPHILS NFR BLD: 51.2 %
PLATELET # BLD AUTO: 343 K/UL
PMV BLD AUTO: 10.5 FL
POCT GLUCOSE: 100 MG/DL (ref 70–110)
POCT GLUCOSE: 115 MG/DL (ref 70–110)
POCT GLUCOSE: 133 MG/DL (ref 70–110)
POTASSIUM SERPL-SCNC: 3.9 MMOL/L
PROT SERPL-MCNC: 7.2 G/DL
RBC # BLD AUTO: 4.83 M/UL
SODIUM SERPL-SCNC: 143 MMOL/L
WBC # BLD AUTO: 7.6 K/UL

## 2019-03-10 PROCEDURE — 36415 COLL VENOUS BLD VENIPUNCTURE: CPT

## 2019-03-10 PROCEDURE — 25000003 PHARM REV CODE 250: Performed by: NURSE PRACTITIONER

## 2019-03-10 PROCEDURE — 85025 COMPLETE CBC W/AUTO DIFF WBC: CPT

## 2019-03-10 PROCEDURE — G0378 HOSPITAL OBSERVATION PER HR: HCPCS

## 2019-03-10 PROCEDURE — 80053 COMPREHEN METABOLIC PANEL: CPT

## 2019-03-10 PROCEDURE — 63600175 PHARM REV CODE 636 W HCPCS: Performed by: NURSE PRACTITIONER

## 2019-03-10 PROCEDURE — 96367 TX/PROPH/DG ADDL SEQ IV INF: CPT

## 2019-03-10 PROCEDURE — S0077 INJECTION, CLINDAMYCIN PHOSP: HCPCS | Performed by: NURSE PRACTITIONER

## 2019-03-10 PROCEDURE — 96361 HYDRATE IV INFUSION ADD-ON: CPT

## 2019-03-10 PROCEDURE — 96376 TX/PRO/DX INJ SAME DRUG ADON: CPT

## 2019-03-10 PROCEDURE — 96365 THER/PROPH/DIAG IV INF INIT: CPT

## 2019-03-10 RX ORDER — ASPIRIN 300 MG/1
150 SUPPOSITORY RECTAL ONCE
Status: COMPLETED | OUTPATIENT
Start: 2019-03-10 | End: 2019-03-10

## 2019-03-10 RX ADMIN — LEVETIRACETAM 500 MG: 5 INJECTION INTRAVENOUS at 09:03

## 2019-03-10 RX ADMIN — HYDRALAZINE HYDROCHLORIDE 10 MG: 20 INJECTION INTRAMUSCULAR; INTRAVENOUS at 05:03

## 2019-03-10 RX ADMIN — POTASSIUM CHLORIDE, DEXTROSE MONOHYDRATE AND SODIUM CHLORIDE: 150; 5; 900 INJECTION, SOLUTION INTRAVENOUS at 05:03

## 2019-03-10 RX ADMIN — CLINDAMYCIN IN 5 PERCENT DEXTROSE 900 MG: 18 INJECTION, SOLUTION INTRAVENOUS at 10:03

## 2019-03-10 RX ADMIN — LEVETIRACETAM 500 MG: 5 INJECTION INTRAVENOUS at 08:03

## 2019-03-10 RX ADMIN — POTASSIUM CHLORIDE, DEXTROSE MONOHYDRATE AND SODIUM CHLORIDE: 150; 5; 900 INJECTION, SOLUTION INTRAVENOUS at 07:03

## 2019-03-10 RX ADMIN — CLINDAMYCIN IN 5 PERCENT DEXTROSE 900 MG: 18 INJECTION, SOLUTION INTRAVENOUS at 12:03

## 2019-03-10 RX ADMIN — ASPIRIN 150 MG: 300 SUPPOSITORY RECTAL at 06:03

## 2019-03-10 RX ADMIN — CLINDAMYCIN IN 5 PERCENT DEXTROSE 900 MG: 18 INJECTION, SOLUTION INTRAVENOUS at 05:03

## 2019-03-10 NOTE — PROGRESS NOTES
"[facility] Medicine  Progress Note    Patient Name: Richard M Pipkins  MRN: [id number]  Patient Class: OP- Observation   Admission Date: 3/9/2019  Length of Stay: 0 days  Attending Physician: Veronica Blakely MD  Primary Care Provider: Annalee Campos MD        Subjective:     Principal Problem:Dysphagia    HPI:  Mr. Pipkins is a 62 yo AAM with significant history for hypertension, hyperlipidemia, DMT2, CKD stage 3, hx of pituitary tumor resection with pituitary adenoma managed per endocrinology, and hx CVA with residual left sided hemiparesis/cognitive impairment and seizure who came to hospital for dysphagia and odynophagia that started Wednesday. Patient went to see PCP for sore throat and trouble swallowing found to have positive pharyngitis -rapid strep A positive-discharge on Keflex 500 mg QID x 10 days. Wife reports been taking all medications up until today. Patient not able to tolerate liquid "spit up water today." Symptoms worst with palpation. Wife reports mental status at baseline but does have generalized weakness.   In ED, CT soft tissue next with contrast showed no acute findings. CT head chronic ischemia, remote lacunar infarcts bilateral basal ganglia/thalmi/left carey and encephalomalacia bilateral frontal lobes and cerebellar hemisphere. UA no evidence of infection.  GI consulted in ED. MBSS ordered in ED.     Hospital Course:  No notes on file    Interval History:  Denies pain. Nurse reports coughing with sips of water last night.     Review of Systems   Constitutional: Positive for activity change and appetite change. Negative for chills, fatigue and fever.   HENT: Positive for drooling, trouble swallowing and voice change.    Eyes: Negative.    Respiratory: Negative.    Cardiovascular: Negative.    Gastrointestinal: Negative.    Endocrine: Negative.    Genitourinary: Negative.    Musculoskeletal: Negative.    Skin: Negative.    Neurological: Positive for " "weakness (left side from previous stroke ).        Left facial droop from previous stroke    Psychiatric/Behavioral: Negative.      Objective:     Vital Signs (Most Recent):  Temp: 98.3 °F (36.8 °C) (03/10/19 1134)  Pulse: 70 (03/10/19 1134)  Resp: 18 (03/10/19 1134)  BP: (!) 166/91 (03/10/19 1134)  SpO2: 99 % (03/10/19 1134) Vital Signs (24h Range):  Temp:  [97.8 °F (36.6 °C)-98.5 °F (36.9 °C)] 98.3 °F (36.8 °C)  Pulse:  [70-95] 70  Resp:  [18] 18  SpO2:  [95 %-99 %] 99 %  BP: (136-193)/(79-98) 166/91     Weight: 77 kg (169 lb 12.8 oz)  Body mass index is 25.08 kg/m².    Intake/Output Summary (Last 24 hours) at 3/10/2019 1556  Last data filed at 3/10/2019 1345  Gross per 24 hour   Intake 2704.58 ml   Output --   Net 2704.58 ml      Physical Exam   Constitutional: He appears well-developed and well-nourished. No distress.   HENT:   Head: Atraumatic.   Mouth/Throat: Oropharynx is clear and moist. No oropharyngeal exudate.   Eyes: EOM are normal.   Neck: Neck supple.   Cardiovascular: Normal rate and regular rhythm.   Pulmonary/Chest: Effort normal and breath sounds normal.   Abdominal: Soft. Bowel sounds are normal. He exhibits no distension.   Musculoskeletal: Normal range of motion. He exhibits no edema or deformity.   Neurological: He is alert. No cranial nerve deficit.   Left sided weakness    Skin: Skin is warm and dry.   Psychiatric: He has a normal mood and affect.       Significant Labs: All pertinent labs within the past 24 hours have been reviewed.    Significant Imaging: I have reviewed and interpreted all pertinent imaging results/findings within the past 24 hours.    Assessment/Plan:      * Dysphagia, Odynophagia, Strep     Unclear etiology. Pharynx and tonsils no exudate or erythema. CT neck addendum showed "some mild circumferential wall thickening in the cervical portion   of the esophagus suggesting possible esophagitis."  MRI brain no acute stroke   3/10 Patient continues to denies any pain. MRI to " "rule out stroke. NPO, IVF.  MBSS ordered in ED. Will continue IV. GI plan for EGD in am.        History of CVA (cerebrovascular accident)    Follow closely by [facility].   Besides dysphagia no new neuro deficits. Hx of CVA >5 year ago with residual left sided hemiparesis/cognitive impairment.   3/10 Continue ASA supp and hold statin until able to take oral.        Seizure    Followed by [facility]. Last seizure "few years ago." Start IV keppra 500 mg BID.        Type 2 diabetes mellitus, without long-term current use of insulin    Hemoglobin A1C   Date Value Ref Range Status   07/21/2006 9.4 (H) 4.5 - 6.2 % Final   Obtain HgbA1c. Diebetic diet once able to eat. IVF D5NS with 20 meq K for now.            VTE Risk Mitigation (From admission, onward)        Ordered     IP VTE LOW RISK PATIENT  Once      03/09/19 1603     Place NNAMDI hose  Until discontinued      03/09/19 1603              Ruth David NP  Department of Hospital Medicine   [facility]  "

## 2019-03-10 NOTE — ASSESSMENT & PLAN NOTE
Follow closely by [facility].   Besides dysphagia no new neuro deficits. Hx of CVA >5 year ago with residual left sided hemiparesis/cognitive impairment.   3/10 Continue ASA supp and hold statin until able to take oral.

## 2019-03-10 NOTE — ASSESSMENT & PLAN NOTE
"Unclear etiology. Pharynx and tonsils no exudate or erythema. CT neck addendum showed "some mild circumferential wall thickening in the cervical portion   of the esophagus suggesting possible esophagitis."  MRI brain no acute stroke   3/10 Patient continues to denies any pain. MRI to rule out stroke. NPO, IVF.  MBSS ordered in ED. Will continue IV. GI plan for EGD in am.     "

## 2019-03-10 NOTE — CONSULTS
"Chief Complaint:  "I have difficulty swallowing."    HPI:  The patient is a 63 year old man with a history of HTN, DM, chronic renal insufficiency, pituitary adenoma s/p resection, CVA, and seizures presenting with dysphagia and odynophagia.  Records suggest this started five days ago, however, he states this started weeks ago.  He was seen by his PCP and a rapid streptococcus A test returned positive, so he was started on keflex.  The patient states his oropharyngeal dysphagia to solid food and odynophagia have been improving.  He was having difficulty with liquid food on the day of presentation.  He has had increasing weakness.  The patient also reports having epigastric pain and diarrhea, but this history is unclear as he has slow speech after his previous stroke.  He denies having weight loss, nausea, emesis, or diarrhea.  The patient reports being on aspirin and plavix, but this history is unclear as he is not currently on these medications.  He does not take NSAIDs.  The patient does not believe he has undergone a colonoscopy in the past.    Past Medical History:   Diagnosis Date    Diabetes mellitus     Hypertension     Seizures     Stroke      Past Surgical History:   Procedure Laterality Date    BRAIN SURGERY       History reviewed. No pertinent family history.    Social History     Socioeconomic History    Marital status:      Spouse name: Not on file    Number of children: Not on file    Years of education: Not on file    Highest education level: Not on file   Social Needs    Financial resource strain: Not on file    Food insecurity - worry: Not on file    Food insecurity - inability: Not on file    Transportation needs - medical: Not on file    Transportation needs - non-medical: Not on file   Occupational History    Not on file   Tobacco Use    Smoking status: Never Smoker   Substance and Sexual Activity    Alcohol use: No     Alcohol/week: 0.0 oz    Drug use: No    Sexual " activity: Not on file   Other Topics Concern    Not on file   Social History Narrative    Not on file        clindamycin (CLEOCIN) IVPB  900 mg Intravenous Q8H    levetiracetam IVPB  500 mg Intravenous Q12H     Review of patient's allergies indicates:   Allergen Reactions    Penicillins        ROS:  No chest pain or dyspnea.  No dysuria.  No heartburn, (+) dysphagia.  Otherwise as stated above.  Ten other systems negative.    Vitals:    03/09/19 1819 03/09/19 1910 03/09/19 2311 03/10/19 0344   BP: (!) 175/85 (!) 166/81 (!) 159/87 136/79   BP Location: Left arm Right arm Right arm Right arm   Patient Position:  Lying Lying Lying   Pulse: 92 95 84 75   Resp:  18 18 18   Temp:  98.2 °F (36.8 °C) 98.3 °F (36.8 °C) 97.8 °F (36.6 °C)   TempSrc:  Oral Oral Oral   SpO2:  98% 99% 99%   Weight:       Height:         P.E.:  GEN: A x O x 3, NAD, slowed speech  SKIN: No jaundice  HEENT: EOMI, PERRL, anicteric sclera  CV: RRR, no M/R/G  Chest: CTA B  Abdomen: soft, NTND, normoactive BS  Ext: No C/C/E.  2+ dorsalis pedis pulses B  Neuro: No asterixes or tremors.  CN II-XII intact, left sided hemiparesis  Musculoskeletal: 5/5 strength bilaterally, left sided hemiparesis    Labs:  Recent Results (from the past 336 hour(s))   CBC auto differential    Collection Time: 03/10/19  5:01 AM   Result Value Ref Range    WBC 7.60 3.90 - 12.70 K/uL    Hemoglobin 14.1 14.0 - 18.0 g/dL    Hematocrit 44.4 40.0 - 54.0 %    Platelets 343 150 - 350 K/uL   CBC auto differential    Collection Time: 03/09/19 11:15 AM   Result Value Ref Range    WBC 9.38 3.90 - 12.70 K/uL    Hemoglobin 13.6 (L) 14.0 - 18.0 g/dL    Hematocrit 41.9 40.0 - 54.0 %    Platelets 388 (H) 150 - 350 K/uL     CMP  Sodium   Date Value Ref Range Status   03/10/2019 143 136 - 145 mmol/L Final     Potassium   Date Value Ref Range Status   03/10/2019 3.9 3.5 - 5.1 mmol/L Final     Chloride   Date Value Ref Range Status   03/10/2019 109 95 - 110 mmol/L Final     CO2   Date Value  Ref Range Status   03/10/2019 25 23 - 29 mmol/L Final     Glucose   Date Value Ref Range Status   03/10/2019 118 (H) 70 - 110 mg/dL Final     BUN, Bld   Date Value Ref Range Status   03/10/2019 18 8 - 23 mg/dL Final     Creatinine   Date Value Ref Range Status   03/10/2019 1.1 0.5 - 1.4 mg/dL Final     Calcium   Date Value Ref Range Status   03/10/2019 9.4 8.7 - 10.5 mg/dL Final     Total Protein   Date Value Ref Range Status   03/10/2019 7.2 6.0 - 8.4 g/dL Final     Albumin   Date Value Ref Range Status   03/10/2019 3.2 (L) 3.5 - 5.2 g/dL Final     Total Bilirubin   Date Value Ref Range Status   03/10/2019 0.5 0.1 - 1.0 mg/dL Final     Comment:     For infants and newborns, interpretation of results should be based  on gestational age, weight and in agreement with clinical  observations.  Premature Infant recommended reference ranges:  Up to 24 hours.............<8.0 mg/dL  Up to 48 hours............<12.0 mg/dL  3-5 days..................<15.0 mg/dL  6-29 days.................<15.0 mg/dL       Alkaline Phosphatase   Date Value Ref Range Status   03/10/2019 69 55 - 135 U/L Final     AST   Date Value Ref Range Status   03/10/2019 20 10 - 40 U/L Final     ALT   Date Value Ref Range Status   03/10/2019 22 10 - 44 U/L Final     Anion Gap   Date Value Ref Range Status   03/10/2019 9 8 - 16 mmol/L Final     eGFR if    Date Value Ref Range Status   03/10/2019 >60 >60 mL/min/1.73 m^2 Final     eGFR if non    Date Value Ref Range Status   03/10/2019 >60 >60 mL/min/1.73 m^2 Final     Comment:     Calculation used to obtain the estimated glomerular filtration  rate (eGFR) is the CKD-EPI equation.          No results for input(s): PT, INR, APTT in the last 24 hours.    CT Soft Tissue of Neck:    No acute findings.      CT of Head:    Stable exam with chronic schema changes as above.    No acute intracranial findings.      MRI of Head:  No evidence of diffusion restriction suggest acute  infarction.    Chronic Microhemorrhages in the basal ganglia and caudate nucleus with expected volume loss within this regions.  The findings remain nonspecific but may be seen with neurodegenerative disease.    Changes of extensive old infarctions in the supratentorial and infratentorial brain.    Increase in GRE susceptibility within the infratentorial supratentorial brain, consistent with prior microhemorrhages.  Leading differential consideration is hypertensive microhemorrhages versus amyloid angiopathy.    Paranasal sinus disease.    A/P:  The patient is a 63 year old man with a history of HTN, DM, chronic renal insufficiency, pituitary adenoma s/p resection, CVA, and seizures presenting with dysphagia and odynophagia.  1.  Dysphagia/Odynophagia - the patient was thought to have a streptococcus A infection and treatment was started earlier last week.  His symptoms have been improving, but he still has some dysphagia and odynophagia.  A swallow study is pending.  Based on these results, an EGD can be performed.  2.  Health Maintenance - an outpatient colonoscopy can be considered after his dysphagia is addressed, if he is able to prep with his hemiparesis.    Thank you for this consult.

## 2019-03-10 NOTE — NURSING
Patient arrived back to unit. No complaints of pain or distress noted. Patient repositioned on left side and call light placed in reach. Patient stable and will continue to be monitored.

## 2019-03-10 NOTE — NURSING
Report received from Reyna SANCHEZ RN. Patient care assumed. Patient is AAOx2 (self and place) and is observed lying in bed on right side with cardiac monitoring in progress. Vital signs stable and found in flow sheets with complete patient assessment. Skin dry but reddened and flaky on face and patient with a 20g RAC PIV noted with D5 and NS with 20 mEq of potassium infusing at 100cc/hr. No complaints of pain and no signs of respiratory distress noted. Plan to consult Gastroenterology, maintain npo status pending swallow study, obtain MRI of brain, and await testing results for further plan. Patient updated on plan of care and verbalized understanding. Call light in reach and bed alarm activated to maintain patient safety. Patient stable and will continue to be monitored.

## 2019-03-10 NOTE — SUBJECTIVE & OBJECTIVE
Interval History:  Denies pain. Nurse reports coughing with sips of water last night.     Review of Systems   Constitutional: Positive for activity change and appetite change. Negative for chills, fatigue and fever.   HENT: Positive for drooling, trouble swallowing and voice change.    Eyes: Negative.    Respiratory: Negative.    Cardiovascular: Negative.    Gastrointestinal: Negative.    Endocrine: Negative.    Genitourinary: Negative.    Musculoskeletal: Negative.    Skin: Negative.    Neurological: Positive for weakness (left side from previous stroke ).        Left facial droop from previous stroke    Psychiatric/Behavioral: Negative.      Objective:     Vital Signs (Most Recent):  Temp: 98.3 °F (36.8 °C) (03/10/19 1134)  Pulse: 70 (03/10/19 1134)  Resp: 18 (03/10/19 1134)  BP: (!) 166/91 (03/10/19 1134)  SpO2: 99 % (03/10/19 1134) Vital Signs (24h Range):  Temp:  [97.8 °F (36.6 °C)-98.5 °F (36.9 °C)] 98.3 °F (36.8 °C)  Pulse:  [70-95] 70  Resp:  [18] 18  SpO2:  [95 %-99 %] 99 %  BP: (136-193)/(79-98) 166/91     Weight: 77 kg (169 lb 12.8 oz)  Body mass index is 25.08 kg/m².    Intake/Output Summary (Last 24 hours) at 3/10/2019 1556  Last data filed at 3/10/2019 1345  Gross per 24 hour   Intake 2704.58 ml   Output --   Net 2704.58 ml      Physical Exam   Constitutional: He appears well-developed and well-nourished. No distress.   HENT:   Head: Atraumatic.   Mouth/Throat: Oropharynx is clear and moist. No oropharyngeal exudate.   Eyes: EOM are normal.   Neck: Neck supple.   Cardiovascular: Normal rate and regular rhythm.   Pulmonary/Chest: Effort normal and breath sounds normal.   Abdominal: Soft. Bowel sounds are normal. He exhibits no distension.   Musculoskeletal: Normal range of motion. He exhibits no edema or deformity.   Neurological: He is alert. No cranial nerve deficit.   Left sided weakness    Skin: Skin is warm and dry.   Psychiatric: He has a normal mood and affect.       Significant Labs: All  pertinent labs within the past 24 hours have been reviewed.    Significant Imaging: I have reviewed and interpreted all pertinent imaging results/findings within the past 24 hours.

## 2019-03-11 ENCOUNTER — ANESTHESIA EVENT (OUTPATIENT)
Dept: ENDOSCOPY | Facility: HOSPITAL | Age: 64
End: 2019-03-11
Payer: MEDICARE

## 2019-03-11 ENCOUNTER — ANESTHESIA (OUTPATIENT)
Dept: ENDOSCOPY | Facility: HOSPITAL | Age: 64
End: 2019-03-11
Payer: MEDICARE

## 2019-03-11 PROBLEM — I10 ESSENTIAL HYPERTENSION: Status: ACTIVE | Noted: 2019-03-11

## 2019-03-11 LAB
ALBUMIN SERPL BCP-MCNC: 3.1 G/DL
ALP SERPL-CCNC: 63 U/L
ALT SERPL W/O P-5'-P-CCNC: 19 U/L
ANION GAP SERPL CALC-SCNC: 7 MMOL/L
AST SERPL-CCNC: 21 U/L
BASOPHILS # BLD AUTO: 0.03 K/UL
BASOPHILS NFR BLD: 0.5 %
BILIRUB SERPL-MCNC: 0.5 MG/DL
BUN SERPL-MCNC: 14 MG/DL
CALCIUM SERPL-MCNC: 9.5 MG/DL
CHLORIDE SERPL-SCNC: 110 MMOL/L
CO2 SERPL-SCNC: 26 MMOL/L
CREAT SERPL-MCNC: 1.1 MG/DL
DIFFERENTIAL METHOD: ABNORMAL
EOSINOPHIL # BLD AUTO: 0.3 K/UL
EOSINOPHIL NFR BLD: 4.6 %
ERYTHROCYTE [DISTWIDTH] IN BLOOD BY AUTOMATED COUNT: 14.9 %
EST. GFR  (AFRICAN AMERICAN): >60 ML/MIN/1.73 M^2
EST. GFR  (NON AFRICAN AMERICAN): >60 ML/MIN/1.73 M^2
GLUCOSE SERPL-MCNC: 111 MG/DL
HCT VFR BLD AUTO: 42.2 %
HGB BLD-MCNC: 13.5 G/DL
LYMPHOCYTES # BLD AUTO: 2.5 K/UL
LYMPHOCYTES NFR BLD: 42.1 %
MCH RBC QN AUTO: 29.8 PG
MCHC RBC AUTO-ENTMCNC: 32 G/DL
MCV RBC AUTO: 93 FL
MONOCYTES # BLD AUTO: 0.3 K/UL
MONOCYTES NFR BLD: 4.3 %
NEUTROPHILS # BLD AUTO: 2.9 K/UL
NEUTROPHILS NFR BLD: 48.5 %
PLATELET # BLD AUTO: 358 K/UL
PMV BLD AUTO: 10.2 FL
POCT GLUCOSE: 106 MG/DL (ref 70–110)
POCT GLUCOSE: 126 MG/DL (ref 70–110)
POCT GLUCOSE: 144 MG/DL (ref 70–110)
POCT GLUCOSE: 75 MG/DL (ref 70–110)
POCT GLUCOSE: 90 MG/DL (ref 70–110)
POTASSIUM SERPL-SCNC: 4.2 MMOL/L
PROT SERPL-MCNC: 7 G/DL
RBC # BLD AUTO: 4.53 M/UL
SODIUM SERPL-SCNC: 143 MMOL/L
WBC # BLD AUTO: 5.87 K/UL

## 2019-03-11 PROCEDURE — 85025 COMPLETE CBC W/AUTO DIFF WBC: CPT

## 2019-03-11 PROCEDURE — C1773 RET DEV, INSERTABLE: HCPCS | Performed by: INTERNAL MEDICINE

## 2019-03-11 PROCEDURE — 63600175 PHARM REV CODE 636 W HCPCS: Performed by: NURSE ANESTHETIST, CERTIFIED REGISTERED

## 2019-03-11 PROCEDURE — 27201042 HC RETRIEVAL NET: Performed by: INTERNAL MEDICINE

## 2019-03-11 PROCEDURE — D9220A PRA ANESTHESIA: Mod: CRNA,,, | Performed by: NURSE ANESTHETIST, CERTIFIED REGISTERED

## 2019-03-11 PROCEDURE — 96361 HYDRATE IV INFUSION ADD-ON: CPT

## 2019-03-11 PROCEDURE — 43247 EGD REMOVE FOREIGN BODY: CPT | Performed by: INTERNAL MEDICINE

## 2019-03-11 PROCEDURE — 37000008 HC ANESTHESIA 1ST 15 MINUTES: Performed by: INTERNAL MEDICINE

## 2019-03-11 PROCEDURE — G0378 HOSPITAL OBSERVATION PER HR: HCPCS

## 2019-03-11 PROCEDURE — 25000003 PHARM REV CODE 250: Performed by: NURSE ANESTHETIST, CERTIFIED REGISTERED

## 2019-03-11 PROCEDURE — D9220A PRA ANESTHESIA: ICD-10-PCS | Mod: CRNA,,, | Performed by: NURSE ANESTHETIST, CERTIFIED REGISTERED

## 2019-03-11 PROCEDURE — D9220A PRA ANESTHESIA: ICD-10-PCS | Mod: ANES,,, | Performed by: ANESTHESIOLOGY

## 2019-03-11 PROCEDURE — 36415 COLL VENOUS BLD VENIPUNCTURE: CPT

## 2019-03-11 PROCEDURE — D9220A PRA ANESTHESIA: Mod: ANES,,, | Performed by: ANESTHESIOLOGY

## 2019-03-11 PROCEDURE — 63600175 PHARM REV CODE 636 W HCPCS: Performed by: NURSE PRACTITIONER

## 2019-03-11 PROCEDURE — 37000009 HC ANESTHESIA EA ADD 15 MINS: Performed by: INTERNAL MEDICINE

## 2019-03-11 PROCEDURE — 80053 COMPREHEN METABOLIC PANEL: CPT

## 2019-03-11 PROCEDURE — 43247 PR EGD, FLEX, W/REMOVAL, FOREIGN BODY: ICD-10-PCS | Mod: ,,, | Performed by: INTERNAL MEDICINE

## 2019-03-11 PROCEDURE — 92610 EVALUATE SWALLOWING FUNCTION: CPT

## 2019-03-11 PROCEDURE — S0077 INJECTION, CLINDAMYCIN PHOSP: HCPCS | Performed by: NURSE PRACTITIONER

## 2019-03-11 PROCEDURE — 25000003 PHARM REV CODE 250: Performed by: PHYSICIAN ASSISTANT

## 2019-03-11 PROCEDURE — 25000003 PHARM REV CODE 250: Performed by: NURSE PRACTITIONER

## 2019-03-11 PROCEDURE — 96375 TX/PRO/DX INJ NEW DRUG ADDON: CPT

## 2019-03-11 PROCEDURE — 43247 EGD REMOVE FOREIGN BODY: CPT | Mod: ,,, | Performed by: INTERNAL MEDICINE

## 2019-03-11 PROCEDURE — 96376 TX/PRO/DX INJ SAME DRUG ADON: CPT | Mod: 59

## 2019-03-11 RX ORDER — DEXTROSE MONOHYDRATE AND SODIUM CHLORIDE 5; .9 G/100ML; G/100ML
INJECTION, SOLUTION INTRAVENOUS CONTINUOUS
Status: DISCONTINUED | OUTPATIENT
Start: 2019-03-11 | End: 2019-03-12 | Stop reason: HOSPADM

## 2019-03-11 RX ORDER — METOPROLOL TARTRATE 1 MG/ML
5 INJECTION, SOLUTION INTRAVENOUS 2 TIMES DAILY
Status: DISCONTINUED | OUTPATIENT
Start: 2019-03-11 | End: 2019-03-12 | Stop reason: HOSPADM

## 2019-03-11 RX ORDER — PROPOFOL 10 MG/ML
VIAL (ML) INTRAVENOUS
Status: DISCONTINUED | OUTPATIENT
Start: 2019-03-11 | End: 2019-03-11

## 2019-03-11 RX ORDER — LIDOCAINE HCL/PF 100 MG/5ML
SYRINGE (ML) INTRAVENOUS
Status: DISCONTINUED | OUTPATIENT
Start: 2019-03-11 | End: 2019-03-11

## 2019-03-11 RX ORDER — PHENYLEPHRINE HYDROCHLORIDE 10 MG/ML
INJECTION INTRAVENOUS
Status: DISCONTINUED | OUTPATIENT
Start: 2019-03-11 | End: 2019-03-11

## 2019-03-11 RX ORDER — SODIUM CHLORIDE 9 MG/ML
INJECTION, SOLUTION INTRAVENOUS CONTINUOUS PRN
Status: DISCONTINUED | OUTPATIENT
Start: 2019-03-11 | End: 2019-03-11

## 2019-03-11 RX ADMIN — PROPOFOL 50 MG: 10 INJECTION, EMULSION INTRAVENOUS at 03:03

## 2019-03-11 RX ADMIN — CLINDAMYCIN IN 5 PERCENT DEXTROSE 900 MG: 18 INJECTION, SOLUTION INTRAVENOUS at 09:03

## 2019-03-11 RX ADMIN — CLINDAMYCIN IN 5 PERCENT DEXTROSE 900 MG: 18 INJECTION, SOLUTION INTRAVENOUS at 07:03

## 2019-03-11 RX ADMIN — PROPOFOL 100 MG: 10 INJECTION, EMULSION INTRAVENOUS at 03:03

## 2019-03-11 RX ADMIN — METOPROLOL TARTRATE 5 MG: 1 INJECTION, SOLUTION INTRAVENOUS at 08:03

## 2019-03-11 RX ADMIN — POTASSIUM CHLORIDE, DEXTROSE MONOHYDRATE AND SODIUM CHLORIDE: 150; 5; 900 INJECTION, SOLUTION INTRAVENOUS at 05:03

## 2019-03-11 RX ADMIN — SODIUM CHLORIDE: 0.9 INJECTION, SOLUTION INTRAVENOUS at 03:03

## 2019-03-11 RX ADMIN — LEVETIRACETAM 500 MG: 5 INJECTION INTRAVENOUS at 09:03

## 2019-03-11 RX ADMIN — LIDOCAINE HYDROCHLORIDE 100 MG: 20 INJECTION, SOLUTION INTRAVENOUS at 03:03

## 2019-03-11 RX ADMIN — METOPROLOL TARTRATE 5 MG: 1 INJECTION, SOLUTION INTRAVENOUS at 09:03

## 2019-03-11 RX ADMIN — DEXTROSE AND SODIUM CHLORIDE: 5; .9 INJECTION, SOLUTION INTRAVENOUS at 08:03

## 2019-03-11 RX ADMIN — CLINDAMYCIN IN 5 PERCENT DEXTROSE 900 MG: 18 INJECTION, SOLUTION INTRAVENOUS at 12:03

## 2019-03-11 RX ADMIN — PHENYLEPHRINE HYDROCHLORIDE 100 MCG: 10 INJECTION INTRAVENOUS at 03:03

## 2019-03-11 RX ADMIN — LEVETIRACETAM 500 MG: 5 INJECTION INTRAVENOUS at 08:03

## 2019-03-11 RX ADMIN — PHENYLEPHRINE HYDROCHLORIDE 200 MCG: 10 INJECTION INTRAVENOUS at 03:03

## 2019-03-11 NOTE — NURSING
PER handoff received from DOMINIC Crowe RN. Responds to voice, however pt is AAOx2 (name and place). Denies having any pain and appearing to be in no distress. IVF infusing to PIV in RAC.  Assessment completed per Doc Flowsheets; reference if needed. Fall and safety precautions maintained. Bed alarm activated and audible. Turned, bed locked in lowest position, with side rails up x3. Call bell and personal items within reach. Will continue to monitor pt for any changes.

## 2019-03-11 NOTE — ASSESSMENT & PLAN NOTE
"Followed by [facility]. Last seizure "few years ago." Continue IV keppra 500 mg BID.     "

## 2019-03-11 NOTE — OR NURSING
Report given to primary RN: Presently patient not awake enough to perform Barium Swallow. Dr Chavira and Dr Barkley aware. Patient to have Barium Swallow before eating, per Dr Chavira.

## 2019-03-11 NOTE — SUBJECTIVE & OBJECTIVE
Interval History: NPO, Reports mild pain this his morning -points to anterior neck mid/left. See above hospital course.     Review of Systems   Constitutional: Positive for activity change and appetite change. Negative for chills, fatigue and fever.   HENT: Positive for drooling, trouble swallowing and voice change.    Eyes: Negative.    Respiratory: Negative.    Cardiovascular: Negative.    Gastrointestinal: Negative.    Endocrine: Negative.    Genitourinary: Negative.    Musculoskeletal: Negative.    Skin: Negative.    Neurological: Positive for weakness (left side from previous stroke ).        Left facial droop from previous stroke    Psychiatric/Behavioral: Negative.      Objective:     Vital Signs (Most Recent):  Temp: 98.2 °F (36.8 °C) (03/11/19 0718)  Pulse: 69 (03/11/19 0718)  Resp: 17 (03/11/19 0718)  BP: (!) 181/95(Nurse Notified) (03/11/19 0718)  SpO2: 99 % (03/11/19 0718) Vital Signs (24h Range):  Temp:  [97.5 °F (36.4 °C)-98.3 °F (36.8 °C)] 98.2 °F (36.8 °C)  Pulse:  [67-86] 69  Resp:  [17-18] 17  SpO2:  [97 %-99 %] 99 %  BP: (142-181)/(74-98) 181/95     Weight: 77 kg (169 lb 12.8 oz)  Body mass index is 25.08 kg/m².    Intake/Output Summary (Last 24 hours) at 3/11/2019 0853  Last data filed at 3/11/2019 0600  Gross per 24 hour   Intake 2726.66 ml   Output --   Net 2726.66 ml      Physical Exam   Constitutional: He appears well-developed and well-nourished. No distress.   HENT:   Head: Atraumatic.   Mouth/Throat: Oropharynx is clear and moist. No oropharyngeal exudate.   Eyes: EOM are normal.   Neck: Neck supple.   Cardiovascular: Normal rate and regular rhythm.   Pulmonary/Chest: Effort normal and breath sounds normal.   Abdominal: Soft. Bowel sounds are normal. He exhibits no distension.   Musculoskeletal: Normal range of motion. He exhibits no edema or deformity.   Neurological: He is alert. No cranial nerve deficit.   Left sided weakness Upper>Left. Left facial droop.    Skin: Skin is warm and dry.    Psychiatric: He has a normal mood and affect.       Significant Labs: All pertinent labs within the past 24 hours have been reviewed.    Significant Imaging: I have reviewed and interpreted all pertinent imaging results/findings within the past 24 hours.

## 2019-03-11 NOTE — HOSPITAL COURSE
"Patient admitted for dysphagia and odynophagia. Patient diagnosed with strep on 3/7/19 placed on Keflex outpatient. On exam, no pharynx or tonsil exudate. CT neck addendum showed "some mild circumferential wall thickening in the cervical portion  of the esophagus suggesting possible esophagitis." MRI no acute stroke. SLP recommended soft diet. Found plastic bottle cap and esophageal ulcer on EGD plus gastritis. Plastic bottle cap retrieved successfully by GI who recommended barium swallow to evaluate for peforation prior to start diet which resulted no definite signs for esophageal leak. Advance diet and patient was able to tolerated diet advancements appropriately. Vital signs grossly stable for entirety of admission. Protonix PO BID for 2 months, per GI rec. Will continue 2 days of Clindamycin  mg TID, for coverage of possible sub optimally treated strep pharyngitis with foreign body (now removed), he received 3 days in the hospital. Discussed plan of care and d/c instructions with patient, who agrees and voiced understanding.     Per speech evaluation: Alternating bites/sips, Feed only when awake/alert, HOB to 90 degrees, Meds whole 1 at a time, Monitor for s/s of aspiration, Remain upright 30 minutes post meal, Small bites/sips and Standard aspiration precautions  "

## 2019-03-11 NOTE — TRANSFER OF CARE
"Anesthesia Transfer of Care Note    Patient: Richard M Pipkins    Procedure(s) Performed: Procedure(s) (LRB):  EGD (ESOPHAGOGASTRODUODENOSCOPY) (N/A)    Patient location: GI    Anesthesia Type: general    Transport from OR: Transported from OR on 2-3 L/min O2 by NC with adequate spontaneous ventilation    Post pain: adequate analgesia    Post assessment: no apparent anesthetic complications and tolerated procedure well    Post vital signs: stable    Level of consciousness: sedated and responds to stimulation    Nausea/Vomiting: no nausea/vomiting    Complications: none    Transfer of care protocol was followed      Last vitals:   Visit Vitals  /61 (BP Location: Right arm, Patient Position: Lying)   Pulse 69   Temp 36.9 °C (98.4 °F) (Oral)   Resp 15   Ht 5' 9" (1.753 m)   Wt 77 kg (169 lb 12.1 oz)   SpO2 100%   BMI 25.07 kg/m²     "

## 2019-03-11 NOTE — [employer] PATIENT INSTRUCTIONS
Discharge Summary/Instructions after an Endoscopic Procedure  Patient Name: Richard Pipkins  Patient MRN: [id number]  Patient YOB: 1955 Monday, March 11, 2019  Snehal Chavira MD  RESTRICTIONS:  During your procedure today, you received medications for sedation.  These   medications may affect your judgment, balance and coordination.  Therefore,   for 24 hours, you have the following restrictions:   - DO NOT drive a car, operate machinery, make legal/financial decisions,   sign important papers or drink alcohol.    ACTIVITY:  Today: no heavy lifting, straining or running due to procedural   sedation/anesthesia.  The following day: return to full activity including work.  DIET:  Eat and drink normally unless instructed otherwise.     TREATMENT FOR COMMON SIDE EFFECTS:  - Mild abdominal pain, nausea, belching, bloating or excessive gas:  rest,   eat lightly and use a heating pad.  - Sore Throat: treat with throat lozenges and/or gargle with warm salt   water.  - Because air was used during the procedure, expelling large amounts of air   from your rectum or belching is normal.  - If a bowel prep was taken, you may not have a bowel movement for 1-3 days.    This is normal.  SYMPTOMS TO WATCH FOR AND REPORT TO YOUR PHYSICIAN:  1. Abdominal pain or bloating, other than gas cramps.  2. Chest pain.  3. Back pain.  4. Signs of infection such as: chills or fever occurring within 24 hours   after the procedure.  5. Rectal bleeding, which would show as bright red, maroon, or black stools.   (A tablespoon of blood from the rectum is not serious, especially if   hemorrhoids are present.)  6. Vomiting.  7. Weakness or dizziness.  GO DIRECTLY TO THE NEAREST EMERGENCY ROOM IF YOU HAVE ANY OF THE FOLLOWING:      Difficulty breathing              Chills and/or fever over 101 F   Persistent vomiting and/or vomiting blood   Severe abdominal pain   Severe chest pain   Black, tarry stools   Bleeding- more than one  tablespoon   Any other symptom or condition that you feel may need urgent attention  Your doctor recommends these additional instructions:  If any biopsies were taken, your doctors clinic will contact you in 1 to 2   weeks with any results.  - [facility].   - Would obtain a barium swallow with water soluble contrast to make sure   there is no esophageal perforation.  If no perforation, he can be started   on a soft diet.   - Continue present medications.   - Use Protonix (pantoprazole) 40 mg PO BID for 2 months.   - Observe patient's clinical course.   - Can check for H. pylori with serology and treat if positive.  - Avoid NSAIDs.  - Can use viscous lidocaine PRN to help with throat pain.  For questions, problems or results please call your physician - Snehal Chavira MD at Work:  [phone].  [facility] can be reached at [phone]     IF A COMPLICATION OR EMERGENCY SITUATION ARISES AND YOU ARE UNABLE TO REACH   YOUR PHYSICIAN - GO DIRECTLY TO THE EMERGENCY ROOM.  Snehal Chavira MD  3/11/2019 4:21:09 PM  This report has been verified and signed electronically.  [employer]

## 2019-03-11 NOTE — CONSULTS
"  [facility] [place]  Adult Nutrition  Consult Note    SUMMARY     Recommendations    1. Advance diet as able to Dental/soft with 2000 ADA restrictions   2. RD to monitor, f/u with NFPE and education needs if warranted.    Goals: Initiate nutrition within 72 hrs  Nutrition Goal Status: new  Communication of RD Recs: (POC)    Reason for Assessment    Reason For Assessment: consult  Diagnosis: (dysphagia; Strep)  Relevant Medical History: HTN, Stroke, DM, CKD-III  Interdisciplinary Rounds: did not attend    General Information Comments: Noted SLP rec of Dental Soft/thin liquids (mild dysphagia with EGD for eval of esophagitis). Pt out of room (EGD) at time of visit. Unable to perform NFPE/eval. Noted prev UBW of ~ 200 lb (unsure of time period).    Nutrition Discharge Planning: Too soon to determine    Nutrition Risk Screen    Nutrition Risk Screen: dysphagia or difficulty swallowing    Nutrition/Diet History    Spiritual, Cultural Beliefs, Restorationist Practices, Values that Affect Care: no    Anthropometrics    Temp: 97.8 °F (36.6 °C)  Height Method: Stated  Height: 5' 9" (175.3 cm)  Height (inches): 69 in  Weight Method: Bed Scale  Weight: 77 kg (169 lb 12.1 oz)  Weight (lb): 169.76 lb  Ideal Body Weight (IBW), Male: 160 lb  % Ideal Body Weight, Male (lb): 106.1 lb  BMI (Calculated): 25.1  BMI Grade: 18.5-24.9 - normal  Weight Loss: unintentional  Usual Body Weight (UBW), k.9 kg  % Usual Body Weight: 84.89  % Weight Change From Usual Weight: -15.29 %       Lab/Procedures/Meds    Pertinent Labs Reviewed: reviewed  Pertinent Medications Reviewed: reviewed  Pertinent Medications Comments: abx    Estimated/Assessed Needs    Weight Used For Calorie Calculations: 77 kg (169 lb 12.1 oz)  Energy Calorie Requirements (kcal): 1937 (x 1.25)     Protein Requirements: 61-77g (0.8-1g/kg)  Weight Used For Protein Calculations: 77 kg (169 lb 12.1 oz)     Estimated Fluid Requirement Method: RDA Method  RDA Method (mL): " 1937  CHO Requirement: 250g      Nutrition Prescription Ordered    Current Diet Order: NPO    Evaluation of Received Nutrient/Fluid Intake    I/O: reviewed  Energy Calories Required: not meeting needs  Protein Required: not meeting needs  Fluid Required: (per MD)  Comments: LBM: 3/10; NPO x 2 days. SLP rec dental soft at this time.  Tolerance: (NPO)    % Meal Intake: NPO    Nutrition Risk    Level of Risk/Frequency of Follow-up: (2 x week)     Assessment and Plan    Nutrition Problem  Inadequate energy intake    Related to (etiology):   Dysphagia    Signs and Symptoms (as evidenced by):   NPO/EGD pending    Interventions:  Collaboration with providers    Nutrition Diagnosis Status:   New       Monitor and Evaluation    Food and Nutrient Intake: energy intake, food and beverage intake  Food and Nutrient Adminstration: diet order  Knowledge/Beliefs/Attitudes: food and nutrition knowledge/skill  Physical Activity and Function: nutrition-related ADLs and IADLs  Anthropometric Measurements: weight, weight change  Biochemical Data, Medical Tests and Procedures: electrolyte and renal panel, glucose/endocrine profile  Nutrition-Focused Physical Findings: overall appearance     Malnutrition Assessment         MICHELL at this time. Pt out of room for EGD.       Nutrition Follow-Up    RD Follow-up?: Yes

## 2019-03-11 NOTE — PROGRESS NOTES
"[facility] Hospital Medicine  Progress Note    Patient Name: Richard M Pipkins  MRN: [id number]  Patient Class: OP- Observation   Admission Date: 3/9/2019  Length of Stay: 0 days  Attending Physician: Veronica Blakely MD  Primary Care Provider: Annalee Campos MD        Subjective:     Principal Problem:Dysphagia    HPI:  Mr. Pipkins is a 62 yo AAM with significant history for hypertension, hyperlipidemia, DMT2, CKD stage 3, hx of pituitary tumor resection with pituitary adenoma managed per endocrinology, and hx CVA with residual left sided hemiparesis/cognitive impairment and seizure who came to hospital for dysphagia and odynophagia that started Wednesday. Patient went to see PCP for sore throat and trouble swallowing found to have positive pharyngitis -rapid strep A positive-discharge on Keflex 500 mg QID x 10 days. Wife reports been taking all medications up until today. Patient not able to tolerate liquid "spit up water today." Symptoms worst with palpation. Wife reports mental status at baseline but does have generalized weakness.   In ED, CT soft tissue next with contrast showed no acute findings. CT head chronic ischemia, remote lacunar infarcts bilateral basal ganglia/thalmi/left carey and encephalomalacia bilateral frontal lobes and cerebellar hemisphere. UA no evidence of infection.  GI consulted in ED. MBSS ordered in ED.     Hospital Course:  Patient admitted for dysphagia and odynophagia. Patient diagnosed with strep on 3/7/19 placed on Keflex outpatient. On exam, no pharynx or tonsil exudate. CT neck addendum showed "some mild circumferential wall thickening in the cervical portion  of the esophagus suggesting possible esophagitis." MRI no acute stroke.   Plan for MBSS and SLP evaluation today. GI following and possible EGD based on MBSS. Continue clindamycin.     Interval History: NPO, Reports mild pain this his morning -points to anterior neck mid/left. See above hospital " course.     Review of Systems   Constitutional: Positive for activity change and appetite change. Negative for chills, fatigue and fever.   HENT: Positive for drooling, trouble swallowing and voice change.    Eyes: Negative.    Respiratory: Negative.    Cardiovascular: Negative.    Gastrointestinal: Negative.    Endocrine: Negative.    Genitourinary: Negative.    Musculoskeletal: Negative.    Skin: Negative.    Neurological: Positive for weakness (left side from previous stroke ).        Left facial droop from previous stroke    Psychiatric/Behavioral: Negative.      Objective:     Vital Signs (Most Recent):  Temp: 98.2 °F (36.8 °C) (03/11/19 0718)  Pulse: 69 (03/11/19 0718)  Resp: 17 (03/11/19 0718)  BP: (!) 181/95(Nurse Notified) (03/11/19 0718)  SpO2: 99 % (03/11/19 0718) Vital Signs (24h Range):  Temp:  [97.5 °F (36.4 °C)-98.3 °F (36.8 °C)] 98.2 °F (36.8 °C)  Pulse:  [67-86] 69  Resp:  [17-18] 17  SpO2:  [97 %-99 %] 99 %  BP: (142-181)/(74-98) 181/95     Weight: 77 kg (169 lb 12.8 oz)  Body mass index is 25.08 kg/m².    Intake/Output Summary (Last 24 hours) at 3/11/2019 0853  Last data filed at 3/11/2019 0600  Gross per 24 hour   Intake 2726.66 ml   Output --   Net 2726.66 ml      Physical Exam   Constitutional: He appears well-developed and well-nourished. No distress.   HENT:   Head: Atraumatic.   Mouth/Throat: Oropharynx is clear and moist. No oropharyngeal exudate.   Eyes: EOM are normal.   Neck: Neck supple.   Cardiovascular: Normal rate and regular rhythm.   Pulmonary/Chest: Effort normal and breath sounds normal.   Abdominal: Soft. Bowel sounds are normal. He exhibits no distension.   Musculoskeletal: Normal range of motion. He exhibits no edema or deformity.   Neurological: He is alert. No cranial nerve deficit.   Left sided weakness Upper>Left. Left facial droop.    Skin: Skin is warm and dry.   Psychiatric: He has a normal mood and affect.       Significant Labs: All pertinent labs within the past 24  "hours have been reviewed.    Significant Imaging: I have reviewed and interpreted all pertinent imaging results/findings within the past 24 hours.    Assessment/Plan:      * Dysphagia, Odynophagia, Strep     Unclear etiology. Pharynx and tonsils no exudate or erythema. CT neck addendum showed "some mild circumferential wall thickening in the cervical portion   of the esophagus suggesting possible esophagitis."  MRI brain no acute stroke   3/10 Patient continues to denies any pain. MRI to rule out stroke. NPO, IVF.  MBSS ordered in ED. Will continue IV. GI plan for EGD in am.   3/11 Reports pain this am.Remains NPO. Plan for MBSS and SLP evaluation today. EGD pending MBSS and SLP evaluation.        History of CVA (cerebrovascular accident)    Follow closely by [facility].   Besides dysphagia no new neuro deficits. Hx of CVA >5 year ago with residual left sided hemiparesis/cognitive impairment.   3/10 Continue ASA supp and hold statin until able to take oral.   3/11 Same as above.        Essential hypertension    Metoprolol 5 mg BID while NPO. Hydralazine PRN.        Seizure    Followed by [facility]. Last seizure "few years ago." Continue IV keppra 500 mg BID.        Type 2 diabetes mellitus, without long-term current use of insulin    Hemoglobin A1C   Date Value Ref Range Status   07/21/2006 9.4 (H) 4.5 - 6.2 % Final   Obtain HgbA1c. Diebetic diet once able to eat. Hold IVF D5NS with 20 meq K this am as rise in BP.            VTE Risk Mitigation (From admission, onward)        Ordered     Place NNAMDI hose  Until discontinued      03/10/19 1921     Place sequential compression device  Until discontinued      03/10/19 1921     IP VTE LOW RISK PATIENT  Once      03/09/19 1603     Place NNAMDI hose  Until discontinued      03/09/19 1603              Ruth David NP  Department of Hospital Medicine   [facility]  "

## 2019-03-11 NOTE — ASSESSMENT & PLAN NOTE
Follow closely by [facility].   Besides dysphagia no new neuro deficits. Hx of CVA >5 year ago with residual left sided hemiparesis/cognitive impairment.   3/10 Continue ASA supp and hold statin until able to take oral.   3/11 Same as above.

## 2019-03-11 NOTE — ANESTHESIA PREPROCEDURE EVALUATION
03/11/2019    Pre-operative evaluation for Procedure(s) (LRB):  EGD (ESOPHAGOGASTRODUODENOSCOPY) (N/A)    Richard M Pipkins is a 63 y.o. male     Patient Active Problem List   Diagnosis    Stroke    H/O: pituitary tumor    Gait instability    Staring spell    LOC (loss of consciousness)    Weakness due to cerebrovascular accident    Abnormality of gait and mobility    Atypical chest pain    Dysphagia, Odynophagia, Strep     Type 2 diabetes mellitus, without long-term current use of insulin    History of CVA (cerebrovascular accident)    Seizure    Essential hypertension       Review of patient's allergies indicates:   Allergen Reactions    Penicillins        VITALS  Vitals:    03/11/19 1455   BP: (!) 166/88   Pulse: 69   Resp: 18   Temp: 36.6 °C (97.8 °F)     LABS  CBC:   Recent Labs     03/10/19  0501 03/11/19  0439   WBC 7.60 5.87   RBC 4.83 4.53*   HGB 14.1 13.5*   HCT 44.4 42.2    358*   MCV 92 93   MCH 29.2 29.8   MCHC 31.8* 32.0       CMP:   Recent Labs     03/09/19  1115 03/10/19  0501 03/11/19  0439    143 143   K 3.8 3.9 4.2    109 110   CO2 30* 25 26   BUN 25* 18 14   CREATININE 1.4 1.1 1.1   GLU 92 118* 111*   MG 2.5  --   --    CALCIUM 9.9 9.4 9.5   ALBUMIN 3.4* 3.2* 3.1*   PROT 7.5 7.2 7.0   ALKPHOS 80 69 63   ALT 24 22 19   AST 20 20 21   BILITOT 0.5 0.5 0.5       Anesthesia Evaluation    I have reviewed the Patient Summary Reports.     I have reviewed the Medications.     Review of Systems  Anesthesia Hx:  No problems with previous Anesthesia  History of prior surgery of interest to airway management or planning: Denies Family Hx of Anesthesia complications.   Denies Personal Hx of Anesthesia complications.   Social:  Non-Smoker, No Alcohol Use    Hematology/Oncology:  Hematology Normal   Oncology Normal     EENT/Dental:EENT/Dental Normal   Cardiovascular:   Hypertension Denies MI.   hyperlipidemia    Pulmonary:  Pulmonary Normal    Renal/:  Renal/ Normal      Hepatic/GI:   dysphagia   Musculoskeletal:  Musculoskeletal Normal    Neurological:   CVA Seizures    Endocrine:   Diabetes, type 2    Dermatological:  Skin Normal    Psych:  Psychiatric Normal           Physical Exam  General:  Well nourished, Obesity    Airway/Jaw/Neck:  Airway Findings: Mouth Opening: Normal Tongue: Normal  General Airway Assessment: Adult  Mallampati: III  TM Distance: Normal, at least 6 cm  Jaw/Neck Findings:  Neck ROM: Normal ROM      Dental:  Dental Findings: Periodontal disease, Severe    Chest/Lungs:  Chest/Lungs Findings: Normal Respiratory Rate         Mental Status:  Mental Status Findings: (alert; disoriented)  Cooperative         Anesthesia Plan  Type of Anesthesia, risks & benefits discussed:  Anesthesia Type:  MAC  Patient's Preference:   Intra-op Monitoring Plan: standard ASA monitors  Intra-op Monitoring Plan Comments:   Post Op Pain Control Plan: per primary service following discharge from PACU  Post Op Pain Control Plan Comments:   Induction:   IV  Beta Blocker:  Patient is on a Beta-Blocker and has received one dose within the past 24 hours (No further documentation required).       Informed Consent: Patient representative understands risks and agrees with Anesthesia plan.  Questions answered. Anesthesia consent signed with patient representative.  ASA Score: 3     Day of Surgery Review of History & Physical: I have interviewed and examined the patient. I have reviewed the patient's H&P dated:  There are no significant changes.          Ready For Surgery From Anesthesia Perspective.

## 2019-03-11 NOTE — ASSESSMENT & PLAN NOTE
"Unclear etiology. Pharynx and tonsils no exudate or erythema. CT neck addendum showed "some mild circumferential wall thickening in the cervical portion   of the esophagus suggesting possible esophagitis."  MRI brain no acute stroke   3/10 Patient continues to denies any pain. MRI to rule out stroke. NPO, IVF.  MBSS ordered in ED. Will continue IV. GI plan for EGD in am.   3/11 Reports pain this am.Remains NPO. Plan for MBSS and SLP evaluation today. EGD pending MBSS and SLP evaluation.     "

## 2019-03-11 NOTE — ANESTHESIA POSTPROCEDURE EVALUATION
"Anesthesia Post Evaluation    Patient: Richard M Pipkins    Procedure(s) Performed: Procedure(s) (LRB):  EGD (ESOPHAGOGASTRODUODENOSCOPY) (N/A)    Final Anesthesia Type: MAC  Patient location during evaluation: GI PACU  Patient participation: Yes- Able to Participate  Level of consciousness: responds to stimulation and lethargic  Post-procedure vital signs: reviewed and stable  Pain management: adequate  Airway patency: patent  PONV status at discharge: No PONV  Anesthetic complications: no      Cardiovascular status: blood pressure returned to baseline, hemodynamically stable and stable  Respiratory status: unassisted, spontaneous ventilation and nasal cannula  Hydration status: euvolemic  Follow-up not needed.        Visit Vitals  BP (!) 140/74   Pulse 68   Temp 36.6 °C (97.8 °F) (Oral)   Resp 18   Ht 5' 9" (1.753 m)   Wt 77 kg (169 lb 12.1 oz)   SpO2 95%   BMI 25.07 kg/m²       Pain/Craig Score: Pain Rating Prior to Med Admin: 0 (3/10/2019  6:00 AM)  Pain Rating Post Med Admin: 0 (3/10/2019  6:00 AM)  Craig Score: 8 (3/11/2019  4:58 PM)  Modified Craig Score: 18 (3/11/2019  4:58 PM)        "

## 2019-03-11 NOTE — PLAN OF CARE
Recommendations     1. Advance diet as able to Dental/soft with 2000 ADA restrictions   2. RD to monitor, f/u with NFPE and education needs if warranted.     Goals: Initiate nutrition within 72 hrs  Nutrition Goal Status: new  Communication of RD Recs: (POC)

## 2019-03-11 NOTE — PLAN OF CARE
"Problem: SLP Goal  Goal: SLP Goal  Short Term Goal:  1.Pt will tolerate dental soft diet with thin liquids without overt s/s aspiration and adequate oral clearance at b/s.  2.Pt will tolerate trials of regular consistency PO without overt s/s aspiration and adequate oral clearance at b/s.  Outcome: Ongoing (interventions implemented as appropriate)  Bedside swallow evaluation completed with SLP. Pt tolerated thin liquids (16 oz overall) and puree (4 oz) without overt s/s aspiration at b/s.  Pt c/o globus sensation with regular solids and demonstrated cough x 2 with third presentation of regular solid bolus (when asked about cough, Pt reported he felt like the food was "stuck".  Pt consumed remainder of deisy crackers in package without overt s/s aspiration at b/s.  Pt initially without complaint of odynophagia, though mid- assessment, reported pain (2/10) with swallowing; Pt again denied odynophagia towards end of bolus presentations.  At this time, it is felt that an MBSS is not warranted (ordered over the w/e). ST Rec: dental soft diet with thin liquids.  Agree with plan for EGD 2/2 globus sensation.  ST to flu for meal tolerance.       "

## 2019-03-11 NOTE — PLAN OF CARE
MSW met with pt. Wrote name on pt's board. Reviewed Help at Home with pt. Discharge Assessment completed.      03/11/19 1204   Discharge Assessment   Assessment Type Discharge Planning Assessment   Confirmed/corrected address and phone number on facesheet? Yes   Assessment information obtained from? Patient   Prior to hospitilization cognitive status: Unable to Assess   Prior to hospitalization functional status: Independent   Current cognitive status: No Deficits   Current Functional Status: Needs Assistance   Facility Arrived From: home   Lives With spouse   Who are your caregiver(s) and their phone number(s)? Sharon Pipkins (spouse) [phone]   Patient's perception of discharge disposition admitted as an inpatient   Patient currently being followed by outpatient case management? No   Patient currently receives any other outside agency services? No   Do you have any problems affording any of your prescribed medications? No   Does the patient have transportation home? Yes   Transportation Anticipated family or friend will provide   Dialysis Name and Scheduled days N/A   Does the patient receive services at the [facility]? No   Discharge Plan A Home with family   Discharge Plan B Home Health   DME Needed Upon Discharge  other (see comments)  (TBD)   Patient/Family in Agreement with Plan yes

## 2019-03-11 NOTE — PT/OT/SLP EVAL
"Speech Language Pathology Evaluation  Bedside Swallow    Patient Name:  Richard M Pipkins   MRN:  [id number]  Admitting Diagnosis: Dysphagia    Recommendations:                 General Recommendations:  GI evaluation and Dysphagia therapy  Diet recommendations:  Dental Soft, Thin   Aspiration Precautions: Alternating bites/sips, Feed only when awake/alert, HOB to 90 degrees, Meds whole 1 at a time, Monitor for s/s of aspiration, Remain upright 30 minutes post meal, Small bites/sips and Standard aspiration precautions   General Precautions: Standard, aspiration  Communication strategies:  provide increased time to answer    History:     Past Medical History:   Diagnosis Date    Diabetes mellitus     Hypertension     Seizures     Stroke        Past Surgical History:   Procedure Laterality Date    BRAIN SURGERY         Social History: Patient lives at home with his spouse.    Modified Barium Swallow: none on file for review    Chest X-Rays: 3/9/19: 1. No acute cardiopulmonary process.    MRI Brain: 3/9/19: No evidence of diffusion restriction suggest acute infarction.  Chronic Microhemorrhages in the basal ganglia and caudate nucleus with expected volume loss within this regions.  The findings remain nonspecific but may be seen with neurodegenerative disease.  Changes of extensive old infarctions in the supratentorial and infratentorial brain.  Increase in GRE susceptibility within the infratentorial supratentorial brain, consistent with prior microhemorrhages.  Leading differential consideration is hypertensive microhemorrhages versus amyloid angiopathy.  Paranasal sinus disease.    CT Soft Tissue Neck: CT neck addendum showed "some mild circumferential wall thickening in the cervical portion  of the esophagus suggesting possible esophagitis."    Prior diet: Regular/thin    Occupation/hobbies/homemaking: Pt reports he likes to "Eat"    From H&P: Mr. Pipkins is a 64 yo AAM with significant history for hypertension, " "hyperlipidemia, DMT2, CKD stage 3, hx of pituitary tumor resection with pituitary adenoma managed per endocrinology, and hx CVA with residual left sided hemiparesis/cognitive impairment and seizure who came to hospital for dysphagia and odynophagia that started Wednesday. Patient went to see PCP for sore throat and trouble swallowing found to have positive pharyngitis -rapid strep A positive-discharge on Keflex 500 mg QID x 10 days. Wife reports been taking all medications up until today. Patient not able to tolerate liquid "spit up water today." Symptoms worst with palpation. Wife reports mental status at baseline but does have generalized weakness.         Subjective   Pt pleasant and agreeable for bedside assessment, though some decreased attention/focus suspected this date (Pt reported he felt "sleepy").  Wife at b/s to provide brief hx and answer questions.  Pt reports odynophagia is getting better.  Pt c/o globus sensation with PO at level of clavicle/manubrium.  When Pt's spouse asked about episode when Pt "spit up water ..", Pt's wife reported he was not coughing/choking, but appeared to be vomiting. Pt added that he felt it was in response to PO "getting stuck".    Patient goals:  To eat    Pain/Comfort:  · Pain Rating 1: 2/10(2/10 pain with swallowing, intermittent)    Objective:     Oral Musculature Evaluation  · Oral Musculature: WFL, left weakness  · Dentition: present and adequate  · Mucosal Quality: adequate  · Mandibular Strength and Mobility: WFL  · Oral Labial Strength and Mobility: WFL, impaired retraction(inpaired retraction on L; mildly weak labial seal on L when pursising and completing buccal valving (occasional air escape on L))  · Lingual Strength and Mobility: WFL(L deviation upon protrusion)  · Buccal Strength and Mobility: WFL(mildly decreased on L)  · Volitional Cough: weak to adequate volitional cough; strong cough in fxn during regular solid trial.  · Volitional Swallow: adeqaute " "suspected via palpation  · Voice Prior to PO Intake: clear/dry pre and post all PO trials    Bedside Swallow Eval:   Consistencies Assessed:  · Thin liquids 16 oz overall (unassisted cup edge sips, single straw sips, rapid consecutive straw sips)  · Puree 4 oz, self-fed tsps  · Solids complete package of deisy crackers     Oral Phase:   · WFL  · Slow oral transit time    Pharyngeal Phase:   · Coughing/choking (x2 on third bolus of regular solid- deisy crackers.  Pt finished remainder of deisy crackers in package without coughing/choking or other overt s/s aspiration at b/s.  When asked to describe sensation that lead to coughing, Pt reported he felt like the food was stuck, and gestured to superior manubrium; Pt denied sensation of airway threat or "going down the wrong tube")  · globus sensation- intermittent, generally in response to regular solid boluses; somewhat alleviated by slow rate of intake, using small and alternating bites and sips.  · no overt clinical signs/symptoms of aspiration- thin liquids, puree, remainder of regular solid boluses except as indicated above.  · Intermittent odynophagia. Pt denied odynophagia during initial PO trials; Pt reported pain with swallowing (2/10) mid-assessment; Pt again denied pain with swallowing during latter bolus presentations.  Occasional grimacing noted with swallowing.    Compensatory Strategies  · slow rate of intake; small and alternating bites and sips        Assessment:     Richard M Pipkins is a 63 y.o. male with mild oral pharyngeal dysphagia who complains of globus sensation and intermittent odynophagia. Esophageal component suspected. Possibly acute and related to pharyngitis and possible esophagitis.  Agree with plan for EGD.    Goals:   Multidisciplinary Problems     SLP Goals        Problem: SLP Goal    Goal Priority Disciplines Outcome   SLP Goal     SLP Ongoing (interventions implemented as appropriate)   Description:  Short Term Goal:  1.Pt will " tolerate dental soft diet with thin liquids without overt s/s aspiration and adequate oral clearance at b/s.  2.Pt will tolerate trials of regular consistency PO without overt s/s aspiration and adequate oral clearance at b/s.                    Plan:     · Patient to be seen:  3 x/week   · Plan of Care expires:  03/18/19  · Plan of Care reviewed with:  patient, spouse   · SLP Follow-Up:  Yes    ·  EGD with GI  · Esophagram or MBSS if warranted after f/u for meal tolerance.   Discharge recommendations:  (TBD- if tolerates soft/thin meals at b/s, an no new findings on EGD, likely no further ST warranted after D/C)   Barriers to Discharge:  None    Time Tracking:     SLP Treatment Date:   03/11/19  Speech Start Time:  1022  Speech Stop Time:  1100     Speech Total Time (min):  38 min    Billable Minutes: Bedside swallow evaluation: 38 min  Total time: 38 min    KATIE Tang, CCC-SLP  03/11/2019

## 2019-03-11 NOTE — PROGRESS NOTES
7505 TN contacted [facility] at [phone]; spoke with staff stating pt is [facility].    TN contacted [facility]; spoke with Jocelyne who stated pt has not seen a drShellie at [facility] since 2017.    TN contacted [facility] again to schedule GI f/u, sees Dr. Chi Ruby; spoke with Chuck, who scheduled a f/u  on 03/19/19 at 11:30 AM, to arrive at 11:00 AM.

## 2019-03-11 NOTE — ASSESSMENT & PLAN NOTE
Hemoglobin A1C   Date Value Ref Range Status   07/21/2006 9.4 (H) 4.5 - 6.2 % Final   Obtain HgbA1c. Diebetic diet once able to eat. Hold IVF D5NS with 20 meq K this am as rise in BP.

## 2019-03-12 VITALS
RESPIRATION RATE: 18 BRPM | BODY MASS INDEX: 25.14 KG/M2 | TEMPERATURE: 98 F | WEIGHT: 169.75 LBS | HEIGHT: 69 IN | HEART RATE: 75 BPM | OXYGEN SATURATION: 96 % | SYSTOLIC BLOOD PRESSURE: 149 MMHG | DIASTOLIC BLOOD PRESSURE: 80 MMHG

## 2019-03-12 LAB
ALBUMIN SERPL BCP-MCNC: 3.1 G/DL
ALP SERPL-CCNC: 66 U/L
ALT SERPL W/O P-5'-P-CCNC: 18 U/L
ANION GAP SERPL CALC-SCNC: 8 MMOL/L
AST SERPL-CCNC: 15 U/L
BASOPHILS # BLD AUTO: 0.03 K/UL
BASOPHILS NFR BLD: 0.4 %
BILIRUB SERPL-MCNC: 0.4 MG/DL
BUN SERPL-MCNC: 11 MG/DL
CALCIUM SERPL-MCNC: 9.1 MG/DL
CHLORIDE SERPL-SCNC: 107 MMOL/L
CO2 SERPL-SCNC: 27 MMOL/L
CREAT SERPL-MCNC: 1.1 MG/DL
DIFFERENTIAL METHOD: ABNORMAL
EOSINOPHIL # BLD AUTO: 0.3 K/UL
EOSINOPHIL NFR BLD: 3.6 %
ERYTHROCYTE [DISTWIDTH] IN BLOOD BY AUTOMATED COUNT: 14.7 %
EST. GFR  (AFRICAN AMERICAN): >60 ML/MIN/1.73 M^2
EST. GFR  (NON AFRICAN AMERICAN): >60 ML/MIN/1.73 M^2
GLUCOSE SERPL-MCNC: 106 MG/DL
HCT VFR BLD AUTO: 43 %
HGB BLD-MCNC: 14.2 G/DL
LYMPHOCYTES # BLD AUTO: 2.6 K/UL
LYMPHOCYTES NFR BLD: 35.3 %
MCH RBC QN AUTO: 30 PG
MCHC RBC AUTO-ENTMCNC: 33 G/DL
MCV RBC AUTO: 91 FL
MONOCYTES # BLD AUTO: 0.6 K/UL
MONOCYTES NFR BLD: 7.8 %
NEUTROPHILS # BLD AUTO: 3.9 K/UL
NEUTROPHILS NFR BLD: 52.9 %
PLATELET # BLD AUTO: 415 K/UL
PMV BLD AUTO: 10.6 FL
POCT GLUCOSE: 144 MG/DL (ref 70–110)
POCT GLUCOSE: 83 MG/DL (ref 70–110)
POCT GLUCOSE: 90 MG/DL (ref 70–110)
POCT GLUCOSE: 95 MG/DL (ref 70–110)
POTASSIUM SERPL-SCNC: 3.7 MMOL/L
PROT SERPL-MCNC: 6.9 G/DL
RBC # BLD AUTO: 4.73 M/UL
SODIUM SERPL-SCNC: 142 MMOL/L
WBC # BLD AUTO: 7.28 K/UL

## 2019-03-12 PROCEDURE — S0077 INJECTION, CLINDAMYCIN PHOSP: HCPCS | Performed by: NURSE PRACTITIONER

## 2019-03-12 PROCEDURE — 25000003 PHARM REV CODE 250: Performed by: NURSE PRACTITIONER

## 2019-03-12 PROCEDURE — 25000003 PHARM REV CODE 250: Performed by: PHYSICIAN ASSISTANT

## 2019-03-12 PROCEDURE — 96376 TX/PRO/DX INJ SAME DRUG ADON: CPT

## 2019-03-12 PROCEDURE — 96375 TX/PRO/DX INJ NEW DRUG ADDON: CPT

## 2019-03-12 PROCEDURE — C9113 INJ PANTOPRAZOLE SODIUM, VIA: HCPCS | Performed by: NURSE PRACTITIONER

## 2019-03-12 PROCEDURE — 36415 COLL VENOUS BLD VENIPUNCTURE: CPT

## 2019-03-12 PROCEDURE — 85025 COMPLETE CBC W/AUTO DIFF WBC: CPT

## 2019-03-12 PROCEDURE — 25500020 PHARM REV CODE 255: Performed by: INTERNAL MEDICINE

## 2019-03-12 PROCEDURE — 63600175 PHARM REV CODE 636 W HCPCS: Performed by: NURSE PRACTITIONER

## 2019-03-12 PROCEDURE — G0378 HOSPITAL OBSERVATION PER HR: HCPCS

## 2019-03-12 PROCEDURE — 80053 COMPREHEN METABOLIC PANEL: CPT

## 2019-03-12 RX ORDER — ASPIRIN 300 MG/1
300 SUPPOSITORY RECTAL DAILY
Status: DISCONTINUED | OUTPATIENT
Start: 2019-03-12 | End: 2019-03-12 | Stop reason: HOSPADM

## 2019-03-12 RX ORDER — PANTOPRAZOLE SODIUM 20 MG/1
20 TABLET, DELAYED RELEASE ORAL DAILY
Qty: 120 TABLET | Refills: 1 | Status: SHIPPED | OUTPATIENT
Start: 2019-03-12

## 2019-03-12 RX ORDER — CLINDAMYCIN HYDROCHLORIDE 300 MG/1
600 CAPSULE ORAL EVERY 8 HOURS
Qty: 12 CAPSULE | Refills: 0 | Status: SHIPPED | OUTPATIENT
Start: 2019-03-12 | End: 2019-03-14

## 2019-03-12 RX ORDER — PANTOPRAZOLE SODIUM 40 MG/10ML
40 INJECTION, POWDER, LYOPHILIZED, FOR SOLUTION INTRAVENOUS DAILY
Status: DISCONTINUED | OUTPATIENT
Start: 2019-03-12 | End: 2019-03-12 | Stop reason: HOSPADM

## 2019-03-12 RX ADMIN — CLINDAMYCIN IN 5 PERCENT DEXTROSE 900 MG: 18 INJECTION, SOLUTION INTRAVENOUS at 09:03

## 2019-03-12 RX ADMIN — DIATRIZOATE MEGLUMINE AND DIATRIZOATE SODIUM 120 ML: 660; 100 LIQUID ORAL; RECTAL at 08:03

## 2019-03-12 RX ADMIN — LEVETIRACETAM 500 MG: 5 INJECTION INTRAVENOUS at 11:03

## 2019-03-12 RX ADMIN — HYDRALAZINE HYDROCHLORIDE 10 MG: 20 INJECTION INTRAMUSCULAR; INTRAVENOUS at 12:03

## 2019-03-12 RX ADMIN — CLINDAMYCIN IN 5 PERCENT DEXTROSE 900 MG: 18 INJECTION, SOLUTION INTRAVENOUS at 01:03

## 2019-03-12 RX ADMIN — DEXTROSE AND SODIUM CHLORIDE: 5; .9 INJECTION, SOLUTION INTRAVENOUS at 10:03

## 2019-03-12 RX ADMIN — PANTOPRAZOLE SODIUM 40 MG: 40 INJECTION, POWDER, LYOPHILIZED, FOR SOLUTION INTRAVENOUS at 11:03

## 2019-03-12 NOTE — PROGRESS NOTES
"1240 TN met with pt to discuss f/u appts.   EDUCATION: TN provided with educational information on Dysphagia.  Information reviewed and placed in :My Healthcare Packet" to be brought home for pt to use as resource after discharge.  Information included:  signs and symptoms to look for and call the doctor if experiencing, and symptoms that may indicate a medical emergency: CALL 911.      All questions answered.  Teach back method used.    Patient stated, "Can you call my wife and let her know".    TN informed floor nurseJordan, care management was complete and can proceed with discharge teaching.    5158 TN contacted pt's spouse, Sharon Pipkins, at [phone]. Informed of pt's discharge. Stated she is currently at work and will be able to pick-up pt until she gets off at 6 pm. TN contacted floor nurseJordan, to convey; unable to leave message; will f/u.        "

## 2019-03-12 NOTE — DISCHARGE INSTRUCTIONS
Complete medications as ordered  Follow all discharge instructions.  Please schedule follow up appointments as necessary      When to Call Your Doctor  Call your doctor immediately if you have any of the following:  · Severe headache  · Severe dizziness, or fainting  · Nausea or vomiting  · Fast heartbeat (higher than 100 beats per minute)  · Fever or chills  · Swollen ankles  · Weakness  · Chest Pain attacks that last longer, occur more often, or are more severe than in the past       Per speech evaluation: Alternating bites/sips, Feed only when awake/alert, HOB to 90 degrees, Meds whole 1 at a time, Monitor for s/s of aspiration, Remain upright 30 minutes post meal, Small bites/sips and Standard aspiration precautions

## 2019-03-12 NOTE — PLAN OF CARE
03/12/19 1358   Final Note   Assessment Type Final Discharge Note   Anticipated Discharge Disposition Home   What phone number can be called within the next 1-3 days to see how you are doing after discharge? (Listed in chart)   Hospital Follow Up  Appt(s) scheduled? Yes   Discharge plans and expectations educations in teach back method with documentation complete? Yes   Right Care Referral Info   Post Acute Recommendation No Care

## 2019-03-12 NOTE — PLAN OF CARE
Problem: Adult Inpatient Plan of Care  Goal: Plan of Care Review   03/12/19 0541   Plan of Care Review   Plan of Care Reviewed With patient;son       Problem: Skin Injury Risk Increased  Goal: Skin Health and Integrity    Intervention: Optimize Skin Protection   03/10/19 1930 03/11/19 1930 03/12/19 0230   Prevent Additional Skin Injury   Head of Bed (HOB) --  --  HOB at 30-45 degrees   Pressure Reduction Devices --  pressure-redistributing mattress utilized --    Pressure Reduction Techniques frequent weight shift encouraged;heels elevated off bed --  --          Problem: Diabetes Comorbidity  Goal: Blood Glucose Level Within Desired Range    Intervention: Maintain Glycemic Control   03/10/19 0815   Monitor and Manage Ketoacidosis   Glycemic Management blood glucose monitoring         Comments: Patient remained free of injury throughout the shift. Vital signs remained WNL. No complaints of pain and no signs of respiratory distress noted. Gastroenterology consulted. Plan for Barium Swallow study in the am and advance diet pending results. Patient and son updated on plan of care and verbalized understanding. Call light in reach and bed alarm maintained for patient safety. Patient stable and will continue to be monitored.

## 2019-03-12 NOTE — MEDICAL/APP STUDENT
"  Mr. Pipkins is a 63 y.o. AA Male with PMHx including hypertension, hyperlipidemia, DMT2, CKD stage 3, seizure, hx of pituitary tumor resection with pituitary adenoma managed per endocrinology, and hx CVA with residual left sided hemiparesis/cognitive impairment who [place] [facility] [place]19 with CC of sore throat, swallowing difficulty "for about 1-2 weeks". Pt was diagnosed in urgent care 3/7/19 with strep A pharyngitis (rapid strep +), discharged home on keflex 500 mg QID x 10 days. The abx did not provide relief of symptoms, and pt was still experiencing dysphagia and odynophagia, which prompted trip to ER.    In ER, pt was worked up for possible dysphagia causes including extension of previous stroke, new CVA, and "secondary concern for a dime aphasia due to retropharyngeal or problem at the epiglottic level." Ct head showed No acute intracranial findings. CT scan of the neck performed and showed "some mild circumferential wall thickening in the cervical portion  of the esophagus suggesting possible esophagitis." Chest x-ray and EKG showed no acute abnormalities. Troponin, BNP, lactic acid unremarkable. No significant electrolyte abnormality. IV clindamycin 900 mg TID started. Admitted for gentle hydration and further eval of dysphagia with EGD.     GI consulted. Upper GI endoscopy 3/11/19 found a "clear plastic bottle cap were found in the esophagus. Removal was successful,"  as well as gastritis. Recommended barium swallow to R/O esophageal perforation, which was negative for perforation or leak. ST consulted and evaluated patient, see recs below. Osmotic laxative given to encourage BM. Pt tolerated diet advancements appropriately. Vital signs grossly stable for entirety of admission. Protonix PO BID for 2 months, per GI rec. Will continue 2 days of Clindamycin  mg TID, for coverage of possible sub optimally treated strep pharyngitis with foreign body (now removed). Discussed plan of care and d/c " instructions with patient, who agrees and voiced understanding.                         Statement Selected

## 2019-03-12 NOTE — PT/OT/SLP PROGRESS
Speech Language Pathology      Richard M Pipkins  MRN: [id number]    Patient not seen today secondary to (unarousable for tx). Will follow-up 3/13/2019.    Viviana Gibbons, CCC-SLP

## 2019-03-12 NOTE — DISCHARGE SUMMARY
"[facility] Hospital Medicine  Discharge Summary      Patient Name: Richard M Pipkins  MRN: [id number]  Admission Date: 3/9/2019  Hospital Length of Stay: 0 days  Discharge Date and Time:  03/12/2019 2:32 PM  Attending Physician: Elvis Weeks MD   Discharging Provider: MILAGROS Potts  Primary Care Provider: Annalee Campos MD      HPI:   Mr. Pipkins is a 62 yo AAM with significant history for hypertension, hyperlipidemia, DMT2, CKD stage 3, hx of pituitary tumor resection with pituitary adenoma managed per endocrinology, and hx CVA with residual left sided hemiparesis/cognitive impairment and seizure who came to hospital for dysphagia and odynophagia that started Wednesday. Patient went to see PCP for sore throat and trouble swallowing found to have positive pharyngitis -rapid strep A positive-discharge on Keflex 500 mg QID x 10 days. Wife reports been taking all medications up until today. Patient not able to tolerate liquid "spit up water today." Symptoms worst with palpation. Wife reports mental status at baseline but does have generalized weakness.   In ED, CT soft tissue next with contrast showed no acute findings. CT head chronic ischemia, remote lacunar infarcts bilateral basal ganglia/thalmi/left carey and encephalomalacia bilateral frontal lobes and cerebellar hemisphere. UA no evidence of infection.  GI consulted in ED. MBSS ordered in ED.     Procedure(s) (LRB):  EGD (ESOPHAGOGASTRODUODENOSCOPY) (N/A)      Hospital Course:   Patient admitted for dysphagia and odynophagia. Patient diagnosed with strep on 3/7/19 placed on Keflex outpatient. On exam, no pharynx or tonsil exudate. CT neck addendum showed "some mild circumferential wall thickening in the cervical portion  of the esophagus suggesting possible esophagitis." MRI no acute stroke. SLP recommended soft diet. Found plastic bottle cap and esophageal ulcer on EGD plus gastritis. Plastic bottle cap retrieved " successfully by GI who recommended barium swallow to evaluate for peforation prior to start diet which resulted no definite signs for esophageal leak. Advance diet and patient was able to tolerated diet advancements appropriately. Vital signs grossly stable for entirety of admission. Protonix PO BID for 2 months, per GI rec. Will continue 2 days of Clindamycin  mg TID, for coverage of possible sub optimally treated strep pharyngitis with foreign body (now removed), he received 3 days in the hospital. Discussed plan of care and d/c instructions with patient, who agrees and voiced understanding.     Per speech evaluation: Alternating bites/sips, Feed only when awake/alert, HOB to 90 degrees, Meds whole 1 at a time, Monitor for s/s of aspiration, Remain upright 30 minutes post meal, Small bites/sips and Standard aspiration precautions     Consults:   Consults (From admission, onward)        Status Ordering Provider     Inpatient consult to Gastroenterology  Once     Provider:  Snehal Chavira MD    Acknowledged MARTITA OLMSTEAD     Inpatient consult to Registered Dietitian/Nutritionist  Once     Provider:  (Not yet assigned)    Completed MARTITA OLMSTEAD     IP consult to case management  Once     Provider:  (Not yet assigned)    Completed MARTITA OLMSTEAD          No new Assessment & Plan notes have been filed under this hospital service since the last note was generated.  Service: Hospital Medicine    Final Active Diagnoses:    Diagnosis Date Noted POA    PRINCIPAL PROBLEM:  Dysphagia, Odynophagia, Strep  [R13.10] 03/09/2019 Yes    Essential hypertension [I10] 03/11/2019 Yes    Type 2 diabetes mellitus, without long-term current use of insulin [E11.9] 03/09/2019 Yes    History of CVA (cerebrovascular accident) [Z86.73] 03/09/2019 Not Applicable    Seizure [R56.9] 03/09/2019 Yes      Problems Resolved During this Admission:       Discharged Condition: stable    Disposition:     Follow Up:  Follow-up  Information     Chi Ruby MD On 3/19/2019.    Specialty:  Gastroenterology  Why:  Outpatient Services GI Follow-Up Tuesday at 11:30  AM; arrive at 11:00 AM.  Contact information:  [address] [place] [address]  [address]  [facility]  [place] [address] [phone]                 Patient Instructions:      Diet Cardiac     Notify your health care provider if you experience any of the following:  temperature >100.4     Notify your health care provider if you experience any of the following:  difficulty breathing or increased cough     Activity as tolerated     Activity as tolerated       Significant Diagnostic Studies: Labs:   CMP   Recent Labs   Lab 03/11/19 0439 03/12/19  0515    142   K 4.2 3.7    107   CO2 26 27   * 106   BUN 14 11   CREATININE 1.1 1.1   CALCIUM 9.5 9.1   PROT 7.0 6.9   ALBUMIN 3.1* 3.1*   BILITOT 0.5 0.4   ALKPHOS 63 66   AST 21 15   ALT 19 18   ANIONGAP 7* 8   ESTGFRAFRICA >60 >60   EGFRNONAA >60 >60   , CBC   Recent Labs   Lab 03/11/19  0439 03/12/19  0515   WBC 5.87 7.28   HGB 13.5* 14.2   HCT 42.2 43.0   * 415*   , INR   Lab Results   Component Value Date    INR 1.0 04/22/2017    INR 1.0 04/13/2016   , Lipid Panel   Lab Results   Component Value Date    CHOL 183 03/23/2006    HDL 26.0 (L) 03/23/2006    LDLCALC 117.0 03/23/2006    TRIG 200 (H) 03/23/2006    CHOLHDL 14.2 (L) 03/23/2006   , Troponin   Recent Labs   Lab 03/09/19  1115   TROPONINI <0.006    and A1C: No results for input(s): HGBA1C in the last 4320 hours.    Pending Diagnostic Studies:     None         Medications:  Reconciled Home Medications:      Medication List      START taking these medications    clindamycin 300 MG capsule  Commonly known as:  CLEOCIN  Take 2 capsules (600 mg total) by mouth every 8 (eight) hours. for 2 days     GI COCKTAIL SIMPLE RECORD  Take 50 mLs by mouth every 8 (eight) hours as needed.     pantoprazole 20 MG tablet  Commonly known as:   PROTONIX  Take 1 tablet (20 mg total) by mouth once daily.        CONTINUE taking these medications    ACCU-CHEK SHAN PLUS TEST STRP Strp  Generic drug:  blood sugar diagnostic     amLODIPine 5 MG tablet  Commonly known as:  NORVASC  Take 5 mg by mouth once daily.     aspirin 81 MG EC tablet  Commonly known as:  ECOTRIN  Take 81 mg by mouth once daily.     atorvastatin 80 MG tablet  Commonly known as:  LIPITOR  Take 80 mg by mouth every evening.     CHLORASEPTIC MAX SORE THROAT MM  by Mucous Membrane route.     clopidogrel 75 mg tablet  Commonly known as:  PLAVIX  Take 75 mg by mouth once.     fish oil-omega-3 fatty acids 300-1,000 mg capsule  Take by mouth once daily.     levETIRAcetam 500 MG Tab  Commonly known as:  KEPPRA  Take 500 mg by mouth 2 (two) times daily.     LOVAZA 1 gram capsule  Generic drug:  omega-3 acid ethyl esters  Take 1 g by mouth 2 (two) times daily.     metoprolol succinate 25 MG 24 hr tablet  Commonly known as:  TOPROL-XL  12.5 mg.     multivit-iron-min-folic acid 3,500-18-0.4 unit-mg-mg Chew  Take by mouth.     multivitamin capsule  Take 1 capsule by mouth once daily.     mupirocin 2 % ointment  Commonly known as:  BACTROBAN  Apply topically once daily.     oxybutynin 5 MG Tab  Commonly known as:  DITROPAN  Take 5 mg by mouth 3 (three) times daily.     pravastatin 40 MG tablet  Commonly known as:  PRAVACHOL  Take 40 mg by mouth once daily.     QUEtiapine 25 MG Tab  Commonly known as:  SEROQUEL     RANEXA 500 MG Tb12  Generic drug:  ranolazine  Take 500 mg by mouth 2 (two) times daily.     sertraline 100 MG tablet  Commonly known as:  ZOLOFT  Take 100 mg by mouth once daily.     tamsulosin 0.4 mg Cap  Commonly known as:  FLOMAX  Take 0.4 mg by mouth once daily.     VICTOZA 3-ANATOLIY 0.6 mg/0.1 mL (18 mg/3 mL) Pnij  Generic drug:  liraglutide 0.6 mg/0.1 mL (18 mg/3 mL) subq PNIJ  Inject 1.2 mg into the skin every morning.     VITAMIN D2 50,000 unit Cap  Generic drug:  ergocalciferol  Take 50,000  Units by mouth every 7 days.        STOP taking these medications    bethanechol 25 MG Tab  Commonly known as:  URECHOLINE     cephALEXin 500 MG capsule  Commonly known as:  KEFLEX     lamoTRIgine 100 MG tablet  Commonly known as:  LAMICTAL     lamoTRIgine 25 MG tablet  Commonly known as:  LAMICTAL     solifenacin 5 MG tablet  Commonly known as:  VESICARE            Indwelling Lines/Drains at time of discharge:   Lines/Drains/Airways     Airway                 Airway - Non-Surgical 03/11/19 1514 Nasal Cannula less than 1 day                Time spent on the discharge of patient: 35 minutes  Patient was seen and examined on the date of discharge and determined to be suitable for discharge.         JENIFFER Carrera, FNP-C  Hospitalist - Department of Hospital Medicine  [address] [place] [address]  Office [phone]; Pager [phone]

## 2019-03-12 NOTE — NURSING
Report received from Jordan SANCHEZ RN. Patient care assumed. Patient is AAOx2 (self and place) and is observed lying in bed with cardiac monitoring in progress and son at bedside. Patient much more alert and verbal today than the past two days. Vital signs stable and found in flow sheets with complete patient assessment. Skin dry and intact with a 20g RAC and a 20g LW PIV both noted saline locked. No complaints of pain and no signs of respiratory distress noted. Call light in reach and patient instructed to inform the nurse if anything is needed. Patient stable and will continue to be monitored.

## 2019-03-12 NOTE — NURSING
Patients son requesting time in which patient will have his Barium swallow study. He was informed that it would not happen until the morning in which he verbalized understanding and other questions answered and plan of care reviewed.

## 2019-03-12 NOTE — PROGRESS NOTES
WRITTEN DISCHARGE HEALTH INFORMATION ON DYSPHAGIA    What you can do  To help control dysphagia, follow your treatment plan. Take all medicines as directed. You also can help lessen your dysphagia symptoms by being careful about what and how you eat.      Eating tips  · Eat slowly in a relaxed setting.  · Dont talk while you eat.  · Take small bites and chew slowly and thoroughly  · Sit in an upright position during and after meals.  · Ask your provider about any special diets that may help, such as liquid diets.  · If you have trouble swallowing solid foods, you can use a  to mash or purée them.  · Thicken liquids with milk, juice, broth, gravy, or starch to make swallowing easier.    Speech therapy  Your healthcare provider may recommend that you have an evaluation or sessions with a speech therapist. These specialists in dysphagia may give you exercises and instructions to help you eat safely.

## 2019-03-12 NOTE — PROGRESS NOTES
Follow-up Information     Chi Ruby MD On 3/19/2019.    Specialty:  Gastroenterology  Why:  Outpatient Services GI Follow-Up Tuesday at 11:30  AM; arrive at 11:00 AM.  Contact information:  [address] [place] [address]  [address]  [facility]  [place] [id number]  [phone]               PLEASE BRING TO ALL FOLLOW UP APPOINTMENTS:   1) A COPY YOUR DISCHARGE INSTRUCTIONS   2) ALL MEDICINES YOU ARE CURRENTLY TAKING IN THEIR ORIGINAL BOTTLES   3) IDENTIFICATION CARD   4) INSURANCE CARD    **PLEASE ARRIVE 15 MINUTES AHEAD OF SCHEDULED APPOINTMENT TIME   ++PLEASE CALL 24 HOURS IN ADVANCE IF YOU MUST RESCHEDULE YOUR APPOINTMENT DAY AND/OR TIME     [facility]    WRITTEN HEALTHCARE AND DISCHARGE INFORMATION                        Help at Home           [phone]  After discharge for assistance [facility] On Call Nurse Care Line 24/7  Assistance    Things You are responsible For To Manage Your Care At Home:  1.    Getting your prescriptions filled   2.    Taking your medications as directed, DO NOT MISS ANY DOSES!  3.    Going to your follow-up doctor appointment. This is important because it  allow the doctor to monitor your progress and determine if  any changes need to made to your treatment plan.     Thank you for choosing [facility] for your care.  Please answer any calls you may receive from [facility] we want to continue to support you as you manage your healthcare needs. [facility] is happy to have the opportunity to serve you.     Sincerely,  Your [facility] Team,  Nidia Tineo RN [place]  Registered Nurse Transitional Navigator  [phone]

## 2019-03-14 LAB
BACTERIA BLD CULT: NORMAL
BACTERIA BLD CULT: NORMAL

## 2019-04-02 ENCOUNTER — HOSPITAL ENCOUNTER (EMERGENCY)
Facility: HOSPITAL | Age: 64
Discharge: HOME | End: 2019-04-02
Attending: INTERNAL MEDICINE
Payer: MEDICARE

## 2019-04-02 VITALS
HEART RATE: 68 BPM | RESPIRATION RATE: 18 BRPM | TEMPERATURE: 98 F | OXYGEN SATURATION: 98 % | WEIGHT: 197 LBS | BODY MASS INDEX: 29.09 KG/M2 | SYSTOLIC BLOOD PRESSURE: 150 MMHG | DIASTOLIC BLOOD PRESSURE: 93 MMHG

## 2019-04-02 DIAGNOSIS — S09.90XA INJURY OF HEAD, INITIAL ENCOUNTER: Primary | ICD-10-CM

## 2019-04-02 DIAGNOSIS — S00.03XA SCALP HEMATOMA, INITIAL ENCOUNTER: ICD-10-CM

## 2019-04-02 PROCEDURE — 99284 EMERGENCY DEPT VISIT MOD MDM: CPT | Mod: 25,ER

## 2019-04-02 NOTE — ED PROVIDER NOTES
Encounter Date: 4/2/2019    SCRIBE #1 NOTE: I, Gary Glez, am scribing for, and in the presence of,  Dr. Jean Baptiste. I have scribed the following portions of the note - Other sections scribed: HPI, ROS, PE.       History     Chief Complaint   Patient presents with    Fall     fall backwards in wheel chair, hematoma to back of head, just occurred, no LOC     CC:  Head Injury  HPI:  This is a 63 y.o. male who presents to the ED with a chief complaint of acute left posterior head swelling with a superficial abrasion s/p falling back in his wheelchair and hitting his head on concrete just prior to arrival.  He has not taken medication PTA.  Pt's wife denies LOC, nausea, emesis, visual disturbance, HA, dizziness, lightheadedness, back pain, or neck pain.  His PMHx includes Seizures, CVA , DM, and HTN.      The history is provided by the patient and the spouse.   Fall   The accident occurred just prior to arrival. Fall occurred: While sitting in his wheelchair. He fell from a height of 1 to 2 ft. He landed on concrete. There was no blood loss. The point of impact was the head. Pain location: Pt has no pain currently. Pertinent negatives include no neck pain, no back pain, no numbness, no abdominal pain, no nausea, no vomiting and no headaches. He has tried nothing for the symptoms. The treatment provided no relief.     Review of patient's allergies indicates:   Allergen Reactions    Penicillins      Past Medical History:   Diagnosis Date    Diabetes mellitus     Hypertension     Seizures     Stroke      Past Surgical History:   Procedure Laterality Date    BRAIN SURGERY      EGD (ESOPHAGOGASTRODUODENOSCOPY) N/A 3/11/2019    Performed by Snehal Chavira MD at [facility] ENDO     History reviewed. No pertinent family history.  Social History     Tobacco Use    Smoking status: Never Smoker   Substance Use Topics    Alcohol use: No     Alcohol/week: 0.0 oz    Drug use: No     Review of Systems   Eyes: Negative for visual  disturbance.   Respiratory: Negative for shortness of breath.    Cardiovascular: Negative for chest pain.   Gastrointestinal: Negative for abdominal pain, nausea and vomiting.   Musculoskeletal: Positive for gait problem (Pt uses wheelchair at baseline). Negative for back pain and neck pain.   Skin: Positive for wound.   Neurological: Negative for dizziness, syncope, weakness, light-headedness, numbness and headaches.   All other systems reviewed and are negative.      Physical Exam     Initial Vitals [04/02/19 1153]   BP Pulse Resp Temp SpO2   125/83 76 19 97.7 °F (36.5 °C) 97 %      MAP       --         Physical Exam    Nursing note and vitals reviewed.  Constitutional: He appears well-developed and well-nourished.   HENT:   Head: Normocephalic.       Right Ear: Tympanic membrane and external ear normal. No hemotympanum.   Left Ear: Tympanic membrane and external ear normal. No hemotympanum.   Nose: Nose normal.   Mouth/Throat: Uvula is midline and mucous membranes are normal.   Left posterior raised area approximately 3 cm in diameter with a superficial abrasion without TTP.    Eyes: Conjunctivae are normal.   Neck: Normal range of motion. Neck supple.   Cardiovascular: Normal rate, regular rhythm, normal heart sounds and intact distal pulses. Exam reveals no gallop and no friction rub.    No murmur heard.  Pulses:       Radial pulses are 2+ on the right side, and 2+ on the left side.   Pulmonary/Chest: Breath sounds normal. No respiratory distress. He has no wheezes. He has no rhonchi. He has no rales. He exhibits no tenderness.   Abdominal: Soft. Bowel sounds are normal. There is no tenderness.   Musculoskeletal: Normal range of motion.   Neurological: He is alert and oriented to person, place, and time.   Skin: Skin is warm and dry. Capillary refill takes less than 2 seconds. Abrasion noted.   Psychiatric: He has a normal mood and affect. His behavior is normal.         ED Course   Procedures  Labs Reviewed -  No data to display       Imaging Results          CT Head Without Contrast (Final result)  Result time 04/02/19 12:31:17    Final result by Rangel Braden MD (04/02/19 12:31:17)                 Impression:      1. Allowing for motion artifact, no convincing acute intraparenchymal abnormalities.  2. Sequela of multifocal remote infarcts, similar to the previous exam.  3. Minimal soft tissue induration involving the soft tissues overlying the right occipital calvarium.      Electronically signed by: Rangel Braden MD  Date:    04/02/2019  Time:    12:31             Narrative:    EXAMINATION:  CT HEAD WITHOUT CONTRAST    CLINICAL HISTORY:  Head trauma, headache;    TECHNIQUE:  Low dose axial images were obtained through the head.  Coronal and sagittal reformations were also performed. Contrast was not administered.    COMPARISON:  MRI 03/09/2019, CT 03/09/2019    FINDINGS:  Examination is limited secondary to patient motion.    There is generalized cerebral volume loss.  There is hypoattenuation in a periventricular fashion, likely sequela of chronic microvascular ischemic change.There is more hypoattenuation anterior to the anterior horn of the left lateral ventricle, and within the basal ganglia bilaterally suggesting sequela of multifocal remote infarct.  This appears stable as compared to the previous exam allowing for motion artifact on current exam.  There are punctate foci of hypoattenuation within the gail also sequela of remote infarcts.  Multifocal remote infarcts noted of the cerebellum.  There is no evidence of acute major vascular territory infarct, hemorrhage, or mass.  There is no hydrocephalus.  There are no abnormal extra-axial fluid collections.  The paranasal sinuses and mastoid air cells are clear, and there is no evidence of calvarial fracture.  The visualized soft tissues are remarkable for minimal soft tissue induration overlying the right aspect of the occipital calvarium..                                  Medical Decision Making:   Initial Assessment:   This is a 63 y.o. male who presents to the ED with a chief complaint of acute left posterior head swelling with a superficial abrasion s/p falling back in his wheelchair and hitting his head on concrete just prior to arrival.  He has not taken medication PTA.  Pt's wife denies LOC, nausea, emesis, visual disturbance, HA, dizziness, lightheadedness, back pain, or neck pain.  His PMHx includes Seizures, CVA , DM, and HTN.      Clinical Tests:   Radiological Study: Ordered  ED Management:  CT of the head was within normal limits.  Patient's relative and patient were given instructions for head injury and advised to bring the patient to his primary care physician tomorrow for re-evaluation.            Scribe Attestation:   Scribe #1: I performed the above scribed service and the documentation accurately describes the services I performed. I attest to the accuracy of the note.    This document was produced by a scribe under my direction and in my presence. I agree with the content of the note and have made any necessary edits.     Dr. Jean Baptiste    04/02/2019 12:44 PM             Clinical Impression:     1. Injury of head, initial encounter    2. Scalp hematoma, initial encounter           Disposition:   Disposition: Discharged  Condition: Stable                        [place] Jean Baptiste MD  04/02/19 6601

## 2019-04-02 NOTE — ED NOTES
Anesthesia reports 160mg Propofol, 40mg Lidocaine and 500mL NS given during procedure. Received report from anesthesia staff on vital signs and status of patient. States patient fell backwords in chair and hit back of head  Around 10  Am this morning- small lump seen - H/O stroke nonverbal with hemiparesis - does follow commands - No LOC stated -

## 2019-12-02 ENCOUNTER — HOSPITAL ENCOUNTER (INPATIENT)
Facility: HOSPITAL | Age: 64
LOS: 7 days | Discharge: NURSING, REHAB OR LONG-TERM CARE | DRG: 101 | End: 2019-12-09
Attending: EMERGENCY MEDICINE | Admitting: HOSPITALIST
Payer: MEDICARE

## 2019-12-02 DIAGNOSIS — R53.1 WEAKNESS: ICD-10-CM

## 2019-12-02 DIAGNOSIS — A41.9 SEPSIS, DUE TO UNSPECIFIED ORGANISM, UNSPECIFIED WHETHER ACUTE ORGAN DYSFUNCTION PRESENT: ICD-10-CM

## 2019-12-02 DIAGNOSIS — R79.89 ELEVATED LACTIC ACID LEVEL: ICD-10-CM

## 2019-12-02 DIAGNOSIS — D72.829 LEUKOCYTOSIS: ICD-10-CM

## 2019-12-02 DIAGNOSIS — R07.9 CHEST PAIN: ICD-10-CM

## 2019-12-02 DIAGNOSIS — D72.829 LEUKOCYTOSIS, UNSPECIFIED TYPE: ICD-10-CM

## 2019-12-02 DIAGNOSIS — R46.89 CHANGE IN BEHAVIOR: Primary | ICD-10-CM

## 2019-12-02 PROBLEM — R41.82 ALTERED MENTAL STATE: Status: ACTIVE | Noted: 2019-12-02

## 2019-12-02 PROBLEM — G40.909 EPILEPSY: Chronic | Status: ACTIVE | Noted: 2019-12-02

## 2019-12-02 PROBLEM — N17.9 ARF (ACUTE RENAL FAILURE): Status: ACTIVE | Noted: 2019-12-02

## 2019-12-02 PROBLEM — E87.20 LACTIC ACIDOSIS: Status: ACTIVE | Noted: 2019-12-02

## 2019-12-02 PROBLEM — N40.0 BPH (BENIGN PROSTATIC HYPERPLASIA): Chronic | Status: ACTIVE | Noted: 2019-12-02

## 2019-12-02 PROBLEM — R41.82 ALTERED MENTAL STATE: Status: RESOLVED | Noted: 2019-12-02 | Resolved: 2019-12-02

## 2019-12-02 PROBLEM — E78.5 HYPERLIPIDEMIA: Chronic | Status: ACTIVE | Noted: 2019-12-02

## 2019-12-02 LAB
ALBUMIN SERPL BCP-MCNC: 3.9 G/DL (ref 3.5–5.2)
ALP SERPL-CCNC: 79 U/L (ref 55–135)
ALT SERPL W/O P-5'-P-CCNC: 36 U/L (ref 10–44)
AMORPH CRY URNS QL MICRO: ABNORMAL
ANION GAP SERPL CALC-SCNC: 14 MMOL/L (ref 8–16)
AST SERPL-CCNC: 24 U/L (ref 10–40)
BACTERIA #/AREA URNS HPF: ABNORMAL /HPF
BASOPHILS # BLD AUTO: ABNORMAL K/UL (ref 0–0.2)
BASOPHILS NFR BLD: 0 % (ref 0–1.9)
BILIRUB SERPL-MCNC: 0.7 MG/DL (ref 0.1–1)
BILIRUB UR QL STRIP: NEGATIVE
BUN SERPL-MCNC: 22 MG/DL (ref 8–23)
CALCIUM SERPL-MCNC: 9.8 MG/DL (ref 8.7–10.5)
CHLORIDE SERPL-SCNC: 104 MMOL/L (ref 95–110)
CHOLEST SERPL-MCNC: 87 MG/DL (ref 120–199)
CHOLEST/HDLC SERPL: 2.5 {RATIO} (ref 2–5)
CLARITY UR: ABNORMAL
CO2 SERPL-SCNC: 26 MMOL/L (ref 23–29)
COLOR UR: ABNORMAL
CREAT SERPL-MCNC: 2.5 MG/DL (ref 0.5–1.4)
DIFFERENTIAL METHOD: ABNORMAL
EOSINOPHIL # BLD AUTO: ABNORMAL K/UL (ref 0–0.5)
EOSINOPHIL NFR BLD: 0 % (ref 0–8)
ERYTHROCYTE [DISTWIDTH] IN BLOOD BY AUTOMATED COUNT: 15.2 % (ref 11.5–14.5)
EST. GFR  (AFRICAN AMERICAN): 30 ML/MIN/1.73 M^2
EST. GFR  (NON AFRICAN AMERICAN): 26 ML/MIN/1.73 M^2
GLUCOSE SERPL-MCNC: 178 MG/DL (ref 70–110)
GLUCOSE UR QL STRIP: NEGATIVE
HCT VFR BLD AUTO: 50.1 % (ref 40–54)
HDLC SERPL-MCNC: 35 MG/DL (ref 40–75)
HDLC SERPL: 40.2 % (ref 20–50)
HGB BLD-MCNC: 16 G/DL (ref 14–18)
HGB UR QL STRIP: NEGATIVE
HYALINE CASTS #/AREA URNS LPF: 60 /LPF
IMM GRANULOCYTES # BLD AUTO: ABNORMAL K/UL (ref 0–0.04)
IMM GRANULOCYTES NFR BLD AUTO: ABNORMAL % (ref 0–0.5)
INR PPP: 1.1 (ref 0.8–1.2)
KETONES UR QL STRIP: NEGATIVE
LACTATE SERPL-SCNC: 4 MMOL/L (ref 0.5–2.2)
LACTATE SERPL-SCNC: 4.5 MMOL/L (ref 0.5–2.2)
LDLC SERPL CALC-MCNC: 40.8 MG/DL (ref 63–159)
LEUKOCYTE ESTERASE UR QL STRIP: NEGATIVE
LYMPHOCYTES # BLD AUTO: ABNORMAL K/UL (ref 1–4.8)
LYMPHOCYTES NFR BLD: 4 % (ref 18–48)
MCH RBC QN AUTO: 29.5 PG (ref 27–31)
MCHC RBC AUTO-ENTMCNC: 31.9 G/DL (ref 32–36)
MCV RBC AUTO: 92 FL (ref 82–98)
MICROSCOPIC COMMENT: ABNORMAL
MONOCYTES # BLD AUTO: ABNORMAL K/UL (ref 0.3–1)
MONOCYTES NFR BLD: 2 % (ref 4–15)
NEUTROPHILS NFR BLD: 76 % (ref 38–73)
NEUTS BAND NFR BLD MANUAL: 18 %
NITRITE UR QL STRIP: NEGATIVE
NONHDLC SERPL-MCNC: 52 MG/DL
NRBC BLD-RTO: 0 /100 WBC
PH UR STRIP: 5 [PH] (ref 5–8)
PLATELET # BLD AUTO: 423 K/UL (ref 150–350)
PMV BLD AUTO: 10.6 FL (ref 9.2–12.9)
POCT GLUCOSE: 142 MG/DL (ref 70–110)
POCT GLUCOSE: 74 MG/DL (ref 70–110)
POTASSIUM SERPL-SCNC: 3.4 MMOL/L (ref 3.5–5.1)
PROT SERPL-MCNC: 7.9 G/DL (ref 6–8.4)
PROT UR QL STRIP: ABNORMAL
PROTHROMBIN TIME: 11.1 SEC (ref 9–12.5)
RBC # BLD AUTO: 5.43 M/UL (ref 4.6–6.2)
RBC #/AREA URNS HPF: 0 /HPF (ref 0–4)
SODIUM SERPL-SCNC: 144 MMOL/L (ref 136–145)
SP GR UR STRIP: 1.02 (ref 1–1.03)
TOXIC GRANULES BLD QL SMEAR: PRESENT
TRIGL SERPL-MCNC: 56 MG/DL (ref 30–150)
TSH SERPL DL<=0.005 MIU/L-ACNC: 0.78 UIU/ML (ref 0.4–4)
URN SPEC COLLECT METH UR: ABNORMAL
UROBILINOGEN UR STRIP-ACNC: ABNORMAL EU/DL
WBC # BLD AUTO: 25.79 K/UL (ref 3.9–12.7)
WBC #/AREA URNS HPF: 8 /HPF (ref 0–5)

## 2019-12-02 PROCEDURE — 84443 ASSAY THYROID STIM HORMONE: CPT

## 2019-12-02 PROCEDURE — 63600175 PHARM REV CODE 636 W HCPCS: Performed by: EMERGENCY MEDICINE

## 2019-12-02 PROCEDURE — 36415 COLL VENOUS BLD VENIPUNCTURE: CPT

## 2019-12-02 PROCEDURE — 81000 URINALYSIS NONAUTO W/SCOPE: CPT

## 2019-12-02 PROCEDURE — 83036 HEMOGLOBIN GLYCOSYLATED A1C: CPT

## 2019-12-02 PROCEDURE — 93010 ELECTROCARDIOGRAM REPORT: CPT | Mod: ,,, | Performed by: INTERNAL MEDICINE

## 2019-12-02 PROCEDURE — 87502 INFLUENZA DNA AMP PROBE: CPT

## 2019-12-02 PROCEDURE — 96365 THER/PROPH/DIAG IV INF INIT: CPT

## 2019-12-02 PROCEDURE — 25000003 PHARM REV CODE 250: Performed by: HOSPITALIST

## 2019-12-02 PROCEDURE — 87086 URINE CULTURE/COLONY COUNT: CPT

## 2019-12-02 PROCEDURE — 87040 BLOOD CULTURE FOR BACTERIA: CPT | Mod: 59

## 2019-12-02 PROCEDURE — 27000221 HC OXYGEN, UP TO 24 HOURS

## 2019-12-02 PROCEDURE — 80061 LIPID PANEL: CPT

## 2019-12-02 PROCEDURE — 85027 COMPLETE CBC AUTOMATED: CPT

## 2019-12-02 PROCEDURE — 85007 BL SMEAR W/DIFF WBC COUNT: CPT

## 2019-12-02 PROCEDURE — 85610 PROTHROMBIN TIME: CPT

## 2019-12-02 PROCEDURE — 25000003 PHARM REV CODE 250: Performed by: EMERGENCY MEDICINE

## 2019-12-02 PROCEDURE — 83605 ASSAY OF LACTIC ACID: CPT | Mod: 91

## 2019-12-02 PROCEDURE — 82962 GLUCOSE BLOOD TEST: CPT

## 2019-12-02 PROCEDURE — 80053 COMPREHEN METABOLIC PANEL: CPT

## 2019-12-02 PROCEDURE — 94761 N-INVAS EAR/PLS OXIMETRY MLT: CPT

## 2019-12-02 PROCEDURE — 20000000 HC ICU ROOM

## 2019-12-02 PROCEDURE — 63600175 PHARM REV CODE 636 W HCPCS: Performed by: HOSPITALIST

## 2019-12-02 PROCEDURE — 93005 ELECTROCARDIOGRAM TRACING: CPT

## 2019-12-02 PROCEDURE — 93010 EKG 12-LEAD: ICD-10-PCS | Mod: ,,, | Performed by: INTERNAL MEDICINE

## 2019-12-02 PROCEDURE — 99291 CRITICAL CARE FIRST HOUR: CPT | Mod: 25

## 2019-12-02 RX ORDER — QUETIAPINE FUMARATE 25 MG/1
25 TABLET, FILM COATED ORAL NIGHTLY
Status: DISCONTINUED | OUTPATIENT
Start: 2019-12-02 | End: 2019-12-03

## 2019-12-02 RX ORDER — ATORVASTATIN CALCIUM 40 MG/1
80 TABLET, FILM COATED ORAL NIGHTLY
Status: DISCONTINUED | OUTPATIENT
Start: 2019-12-02 | End: 2019-12-09 | Stop reason: HOSPADM

## 2019-12-02 RX ORDER — SODIUM CHLORIDE 0.9 % (FLUSH) 0.9 %
10 SYRINGE (ML) INJECTION
Status: DISCONTINUED | OUTPATIENT
Start: 2019-12-02 | End: 2019-12-09 | Stop reason: HOSPADM

## 2019-12-02 RX ORDER — SERTRALINE HYDROCHLORIDE 50 MG/1
100 TABLET, FILM COATED ORAL DAILY
Status: DISCONTINUED | OUTPATIENT
Start: 2019-12-03 | End: 2019-12-03

## 2019-12-02 RX ORDER — SODIUM CHLORIDE 9 MG/ML
INJECTION, SOLUTION INTRAVENOUS CONTINUOUS
Status: DISCONTINUED | OUTPATIENT
Start: 2019-12-02 | End: 2019-12-09 | Stop reason: HOSPADM

## 2019-12-02 RX ORDER — OXYBUTYNIN CHLORIDE 5 MG/1
5 TABLET ORAL 3 TIMES DAILY
Status: DISCONTINUED | OUTPATIENT
Start: 2019-12-02 | End: 2019-12-09 | Stop reason: HOSPADM

## 2019-12-02 RX ORDER — GLUCAGON 1 MG
1 KIT INJECTION
Status: DISCONTINUED | OUTPATIENT
Start: 2019-12-02 | End: 2019-12-09 | Stop reason: HOSPADM

## 2019-12-02 RX ORDER — ACETAMINOPHEN 325 MG/1
650 TABLET ORAL EVERY 4 HOURS PRN
Status: DISCONTINUED | OUTPATIENT
Start: 2019-12-02 | End: 2019-12-09 | Stop reason: HOSPADM

## 2019-12-02 RX ORDER — ENOXAPARIN SODIUM 100 MG/ML
30 INJECTION SUBCUTANEOUS EVERY 24 HOURS
Status: DISCONTINUED | OUTPATIENT
Start: 2019-12-02 | End: 2019-12-09 | Stop reason: HOSPADM

## 2019-12-02 RX ORDER — INSULIN ASPART 100 [IU]/ML
0-5 INJECTION, SOLUTION INTRAVENOUS; SUBCUTANEOUS EVERY 6 HOURS PRN
Status: DISCONTINUED | OUTPATIENT
Start: 2019-12-02 | End: 2019-12-09 | Stop reason: HOSPADM

## 2019-12-02 RX ORDER — CLOPIDOGREL BISULFATE 75 MG/1
75 TABLET ORAL ONCE
Status: DISCONTINUED | OUTPATIENT
Start: 2019-12-02 | End: 2019-12-09 | Stop reason: HOSPADM

## 2019-12-02 RX ORDER — POLYETHYLENE GLYCOL 3350 17 G/17G
17 POWDER, FOR SOLUTION ORAL 2 TIMES DAILY PRN
Status: DISCONTINUED | OUTPATIENT
Start: 2019-12-02 | End: 2019-12-09 | Stop reason: HOSPADM

## 2019-12-02 RX ORDER — CEFEPIME HYDROCHLORIDE 1 G/50ML
1 INJECTION, SOLUTION INTRAVENOUS
Status: COMPLETED | OUTPATIENT
Start: 2019-12-02 | End: 2019-12-02

## 2019-12-02 RX ORDER — CEFEPIME HYDROCHLORIDE 1 G/50ML
1 INJECTION, SOLUTION INTRAVENOUS
Status: DISCONTINUED | OUTPATIENT
Start: 2019-12-03 | End: 2019-12-04

## 2019-12-02 RX ORDER — TAMSULOSIN HYDROCHLORIDE 0.4 MG/1
0.4 CAPSULE ORAL DAILY
Status: DISCONTINUED | OUTPATIENT
Start: 2019-12-03 | End: 2019-12-09 | Stop reason: HOSPADM

## 2019-12-02 RX ORDER — LEVETIRACETAM 500 MG/1
500 TABLET ORAL 2 TIMES DAILY
Status: DISCONTINUED | OUTPATIENT
Start: 2019-12-02 | End: 2019-12-04

## 2019-12-02 RX ORDER — ASPIRIN 325 MG
325 TABLET ORAL DAILY
Status: DISCONTINUED | OUTPATIENT
Start: 2019-12-02 | End: 2019-12-03

## 2019-12-02 RX ADMIN — VANCOMYCIN HYDROCHLORIDE 1250 MG: 1.25 INJECTION, POWDER, LYOPHILIZED, FOR SOLUTION INTRAVENOUS at 04:12

## 2019-12-02 RX ADMIN — SODIUM CHLORIDE: 0.9 INJECTION, SOLUTION INTRAVENOUS at 07:12

## 2019-12-02 RX ADMIN — ATORVASTATIN CALCIUM 80 MG: 40 TABLET, FILM COATED ORAL at 08:12

## 2019-12-02 RX ADMIN — CEFEPIME HYDROCHLORIDE 1 G: 1 INJECTION, SOLUTION INTRAVENOUS at 02:12

## 2019-12-02 RX ADMIN — OXYBUTYNIN CHLORIDE 5 MG: 5 TABLET ORAL at 08:12

## 2019-12-02 RX ADMIN — ENOXAPARIN SODIUM 30 MG: 100 INJECTION SUBCUTANEOUS at 08:12

## 2019-12-02 RX ADMIN — LEVETIRACETAM 500 MG: 500 TABLET ORAL at 08:12

## 2019-12-02 RX ADMIN — SODIUM CHLORIDE 2313 ML: 0.9 INJECTION, SOLUTION INTRAVENOUS at 02:12

## 2019-12-02 NOTE — ED NOTES
Patient drowsy. Arouses to questions and answers with one word then back to sleep. Not alert enough at this time to assess swallowing ability.

## 2019-12-02 NOTE — ED PROVIDER NOTES
Encounter Date: 12/2/2019    SCRIBE #1 NOTE: I, Sukhdeep Nava, am scribing for, and in the presence of,  Shai Chen MD. I have scribed the following portions of the note - Other sections scribed: HPI, ROS.       History     Chief Complaint   Patient presents with    generalized weakness     Per EMS, pt family called reporting that pt has been having generalized weakness since approx 0600am     CC: Generalized Weakness    HPI: This 64 y.o. Male with a past medical history of diabetes mellitus, hypertension, seizures and stroke presents to the ED via EMS for an evaluation of generalized weakness since this morning. Pt reports having a stroke in the past that made his L side paralyzed. He could not walk on his own this morning and was not talking. Pt's most recent known normal was 7pm last night when he went to bed. He has chronic bilateral L>R weakness and chronic incontinence but has not had incontinence today. Pt took Robitussin DM with no relief. His wife is concerned b/c he has not been mobile or talking.    The history is provided by the spouse and the patient. No  was used.     Review of patient's allergies indicates:   Allergen Reactions    Penicillins      Past Medical History:   Diagnosis Date    Diabetes mellitus     Hypertension     Seizures     Stroke      Past Surgical History:   Procedure Laterality Date    BRAIN SURGERY      ESOPHAGOGASTRODUODENOSCOPY N/A 3/11/2019    Procedure: EGD (ESOPHAGOGASTRODUODENOSCOPY);  Surgeon: Snehal Chavira MD;  Location: [facility];  Service: Endoscopy;  Laterality: N/A;    HERNIA REPAIR       History reviewed. No pertinent family history.  Social History     Tobacco Use    Smoking status: Never Smoker   Substance Use Topics    Alcohol use: No     Alcohol/week: 0.0 standard drinks    Drug use: No     Review of Systems   Constitutional: Negative for fever.   HENT: Negative for sore throat.    Respiratory: Negative for shortness of breath.     Cardiovascular: Negative for chest pain.   Gastrointestinal: Negative for nausea.   Genitourinary: Negative for dysuria.   Musculoskeletal: Negative for back pain.   Skin: Negative for rash.   Neurological: Positive for weakness.   Hematological: Does not bruise/bleed easily.       Physical Exam     Initial Vitals [12/02/19 1135]   BP Pulse Resp Temp SpO2   (!) 147/94 99 20 99.1 °F (37.3 °C) 95 %      MAP       --         Physical Exam  The patient was examined specifically for the following:   General:No significant distress, Good color, Warm and dry. Head and neck:Scalp atraumatic, Neck supple. Neurological:Appropriate conversation, Gross motor deficits. Eyes:Conjugate gaze, Clear corneas. ENT: No epistaxis. Cardiac: Regular rate and rhythm, Grossly normal heart tones. Pulmonary: Wheezing, Rales. Gastrointestinal: Abdominal tenderness, Abdominal distention. Musculoskeletal: Extremity deformity, Apparent pain with range of motion of the joints. Skin: Rash.   The findings on examination were normal except for the following:  The patient seems inattentive.  He will only answer simple questions.  The face is grossly symmetrical. The patient has left greater than right upper extremity weakness. He moves both feet when stimulated in the stretcher.   ED Course   Critical Care  Date/Time: 12/2/2019 3:50 PM  Performed by: Shai Chen MD  Authorized by: Shai Chen MD   Direct patient critical care time: 22 minutes  Additional history critical care time: 11 minutes  Ordering / reviewing critical care time: 11 minutes  Documentation critical care time: 11 minutes  Consulting other physicians critical care time: 11 minutes  Total critical care time (exclusive of procedural time) : 66 minutes  Critical care time was exclusive of separately billable procedures and treating other patients and teaching time.  Critical care was necessary to treat or prevent imminent or life-threatening deterioration of the following  conditions: circulatory failure, sepsis and CNS failure or compromise.  Critical care was time spent personally by me on the following activities: development of treatment plan with patient or surrogate, discussions with primary provider, discussions with consultants, evaluation of patient's response to treatment, examination of patient, obtaining history from patient or surrogate, ordering and performing treatments and interventions, ordering and review of radiographic studies, ordering and review of laboratory studies, pulse oximetry, re-evaluation of patient's condition and review of old charts.        Labs Reviewed   CBC W/ AUTO DIFFERENTIAL - Abnormal; Notable for the following components:       Result Value    WBC 25.79 (*)     Mean Corpuscular Hemoglobin Conc 31.9 (*)     RDW 15.2 (*)     Platelets 423 (*)     Gran% 76.0 (*)     Lymph% 4.0 (*)     Mono% 2.0 (*)     All other components within normal limits   COMPREHENSIVE METABOLIC PANEL - Abnormal; Notable for the following components:    Potassium 3.4 (*)     Glucose 178 (*)     Creatinine 2.5 (*)     eGFR if  30 (*)     eGFR if non  26 (*)     All other components within normal limits   LIPID PANEL - Abnormal; Notable for the following components:    Cholesterol 87 (*)     HDL 35 (*)     LDL Cholesterol 40.8 (*)     All other components within normal limits   LACTIC ACID, PLASMA - Abnormal; Notable for the following components:    Lactate (Lactic Acid) 4.5 (*)     All other components within normal limits    Narrative:       Lactic Acid critical result(s) called and verbal readback obtained   from Genna Johnson. by BOGDAN 12/02/2019 15:20   URINALYSIS - Abnormal; Notable for the following components:    Appearance, UA Hazy (*)     Protein, UA 2+ (*)     Urobilinogen, UA 4.0-6.0 (*)     All other components within normal limits   URINALYSIS MICROSCOPIC - Abnormal; Notable for the following components:    WBC, UA 8 (*)      Hyaline Casts, UA 60 (*)     All other components within normal limits   POCT GLUCOSE - Abnormal; Notable for the following components:    POCT Glucose 142 (*)     All other components within normal limits   CULTURE, BLOOD   CULTURE, BLOOD   INFLUENZA A & B BY MOLECULAR   CULTURE, URINE   PROTIME-INR   TSH   POCT GLUCOSE, HAND-HELD DEVICE          Imaging Results          X-Ray Chest AP Portable (Final result)  Result time 12/02/19 13:24:50    Final result by Mathew Huang MD (12/02/19 13:24:50)                 Impression:      No detrimental change or radiographic acute intrathoracic process seen.      Electronically signed by: Mathew Huang MD  Date:    12/02/2019  Time:    13:24             Narrative:    EXAMINATION:  XR CHEST AP PORTABLE    CLINICAL HISTORY:  Stroke;    TECHNIQUE:  Single frontal view of the chest was performed.    COMPARISON:  Chest radiograph 03/09/2019    FINDINGS:  Monitoring leads overlie the chest.  No detrimental change.The lungs are clear, with normal appearance of pulmonary vasculature and no pleural effusion or pneumothorax.    The cardiac silhouette is normal in size. The hilar and mediastinal contours are unremarkable.    Bones are intact.                               CT Head Without Contrast (Final result)  Result time 12/02/19 12:15:45    Final result by Fred Velázquez MD (12/02/19 12:15:45)                 Impression:      1. Generalized cerebral and cerebellar atrophy.  2. And cephalo malacia changes in the left frontal lobe related to a remote left frontal lobe infarction, without significant change from previous study.  3. Scattered hypodensities in the basal ganglia and the the periventricular white matter likely from chronic ischemic changes.  4. If a acute infarction is suspected then a MRI with diffusion-weighted sequences is suggested.      Electronically signed by: Fred Velázquez MD  Date:    12/02/2019  Time:    12:15             Narrative:    EXAMINATION:  CT HEAD  WITHOUT CONTRAST    CLINICAL HISTORY:  Focal neuro deficit, new, fixed or worsening, >6 hours;    TECHNIQUE:  Low dose axial images were obtained through the head.  Coronal and sagittal reformations were also performed. Contrast was not administered.    COMPARISON:  CT scan of the head 04/02/2019    FINDINGS:  Prominence of the lateral ventricles and cerebral sulci over the high convexities representing generalized cerebral atrophy again noted.  There is also cerebellar atrophy.  No definite signs for obstructive hydrocephalus.  There is encephalomalacia changes associated with a left frontal lobe infarction unchanged from previous study.  In addition there are numerous hypodensities in the periventricular white matter representing chronic microvascular ischemic disease.  There is an old lacunar in the head of the left caudate associated with ipsilateral dilatation of the left frontal horn.  Scattered hypodensities in the external capsules bilaterally from chronic microvascular ischemic disease.    There is cerebellar atrophy.  Focal cortical infarctions in the cerebellar hemispheres bilaterally remain without significant change from the previous study.  There is a focal hypodensity in the belly of the gail either representing a prominent perivascular space or a old lacunar.    No abnormalities of the cranial vault.  The visualized paranasal air sinuses are clear.  Normal pneumatization of the mastoids.  The                              Medical decision making:  I discussed this case with , at 12:10 p.m..  He recommends MRI of the brain, MRA of the brain to complete the patient's triage.   The patient's lab work was remarkable for white blood count of 82473.  The diagnosis of sepsis was entertained.  Further evaluation reveals a lactate of 4.  Patient has had declining blood pressures.  His map is still well above 63.  Total focus of infection could actually be identified.  The patient will be admitted to  the ICU.  I discussed this case with Dr. Leach.  We will admit the patient continue antibiotics and continue neuro imaging with Neurology consultation.                   Scribe Attestation:   Scribe #1: I performed the above scribed service and the documentation accurately describes the services I performed. I attest to the accuracy of the note.                          Clinical Impression:       ICD-10-CM ICD-9-CM   1. Change in behavior R46.89 312.9   2. Leukocytosis D72.829 288.60   3. Weakness R53.1 780.79   4. Elevated lactic acid level R79.89 276.2   5. Sepsis, due to unspecified organism, unspecified whether acute organ dysfunction present A41.9 038.9     995.91            I personally performed the services described in this documentation.  All medical record  entries made by the scribe are at my direction and in my presence.  Signed, Dr. April Chen MD  12/02/19 9296       Shai Chen MD  12/02/19 0546

## 2019-12-02 NOTE — ED TRIAGE NOTES
Pt arrives to ED via EMS with c/o left sided facial droop, non-verbal per wife, and non-amblatory per wife that started at appoximately 0600 . Hx CVA. Per wife LWKT 1900 yesterday. Pt ambulatory and left sided paralysis baseline per wife.

## 2019-12-03 PROBLEM — R46.89 CHANGE IN BEHAVIOR: Status: ACTIVE | Noted: 2019-12-03

## 2019-12-03 LAB
ANION GAP SERPL CALC-SCNC: 9 MMOL/L (ref 8–16)
BASOPHILS # BLD AUTO: 0.08 K/UL (ref 0–0.2)
BASOPHILS NFR BLD: 0.3 % (ref 0–1.9)
BUN SERPL-MCNC: 24 MG/DL (ref 8–23)
CALCIUM SERPL-MCNC: 8.6 MG/DL (ref 8.7–10.5)
CHLORIDE SERPL-SCNC: 109 MMOL/L (ref 95–110)
CO2 SERPL-SCNC: 25 MMOL/L (ref 23–29)
CREAT SERPL-MCNC: 1.7 MG/DL (ref 0.5–1.4)
DIFFERENTIAL METHOD: ABNORMAL
EOSINOPHIL # BLD AUTO: 0 K/UL (ref 0–0.5)
EOSINOPHIL NFR BLD: 0.1 % (ref 0–8)
ERYTHROCYTE [DISTWIDTH] IN BLOOD BY AUTOMATED COUNT: 15.4 % (ref 11.5–14.5)
EST. GFR  (AFRICAN AMERICAN): 48 ML/MIN/1.73 M^2
EST. GFR  (NON AFRICAN AMERICAN): 41 ML/MIN/1.73 M^2
ESTIMATED AVG GLUCOSE: 171 MG/DL (ref 68–131)
GLUCOSE SERPL-MCNC: 84 MG/DL (ref 70–110)
HBA1C MFR BLD HPLC: 7.6 % (ref 4–5.6)
HCT VFR BLD AUTO: 44 % (ref 40–54)
HGB BLD-MCNC: 13.9 G/DL (ref 14–18)
IMM GRANULOCYTES # BLD AUTO: 0.86 K/UL (ref 0–0.04)
IMM GRANULOCYTES NFR BLD AUTO: 3 % (ref 0–0.5)
LACTATE SERPL-SCNC: 2.5 MMOL/L (ref 0.5–2.2)
LYMPHOCYTES # BLD AUTO: 3.3 K/UL (ref 1–4.8)
LYMPHOCYTES NFR BLD: 11.3 % (ref 18–48)
MCH RBC QN AUTO: 29.6 PG (ref 27–31)
MCHC RBC AUTO-ENTMCNC: 31.6 G/DL (ref 32–36)
MCV RBC AUTO: 94 FL (ref 82–98)
MONOCYTES # BLD AUTO: 0.9 K/UL (ref 0.3–1)
MONOCYTES NFR BLD: 3 % (ref 4–15)
NEUTROPHILS # BLD AUTO: 23.7 K/UL (ref 1.8–7.7)
NEUTROPHILS NFR BLD: 82.3 % (ref 38–73)
NRBC BLD-RTO: 0 /100 WBC
PLATELET # BLD AUTO: 321 K/UL (ref 150–350)
PMV BLD AUTO: 10.3 FL (ref 9.2–12.9)
POCT GLUCOSE: 118 MG/DL (ref 70–110)
POCT GLUCOSE: 62 MG/DL (ref 70–110)
POCT GLUCOSE: 70 MG/DL (ref 70–110)
POTASSIUM SERPL-SCNC: 3.8 MMOL/L (ref 3.5–5.1)
RBC # BLD AUTO: 4.69 M/UL (ref 4.6–6.2)
SODIUM SERPL-SCNC: 143 MMOL/L (ref 136–145)
VANCOMYCIN SERPL-MCNC: 9.5 UG/ML
WBC # BLD AUTO: 28.78 K/UL (ref 3.9–12.7)

## 2019-12-03 PROCEDURE — 99222 1ST HOSP IP/OBS MODERATE 55: CPT | Mod: ,,, | Performed by: PSYCHIATRY & NEUROLOGY

## 2019-12-03 PROCEDURE — 99222 PR INITIAL HOSPITAL CARE,LEVL II: ICD-10-PCS | Mod: ,,, | Performed by: PSYCHIATRY & NEUROLOGY

## 2019-12-03 PROCEDURE — 11000001 HC ACUTE MED/SURG PRIVATE ROOM

## 2019-12-03 PROCEDURE — 25000003 PHARM REV CODE 250: Performed by: HOSPITALIST

## 2019-12-03 PROCEDURE — 63600175 PHARM REV CODE 636 W HCPCS: Performed by: HOSPITALIST

## 2019-12-03 PROCEDURE — 80048 BASIC METABOLIC PNL TOTAL CA: CPT

## 2019-12-03 PROCEDURE — 36415 COLL VENOUS BLD VENIPUNCTURE: CPT

## 2019-12-03 PROCEDURE — 63600175 PHARM REV CODE 636 W HCPCS: Performed by: EMERGENCY MEDICINE

## 2019-12-03 PROCEDURE — 94761 N-INVAS EAR/PLS OXIMETRY MLT: CPT

## 2019-12-03 PROCEDURE — 27000221 HC OXYGEN, UP TO 24 HOURS

## 2019-12-03 PROCEDURE — 80202 ASSAY OF VANCOMYCIN: CPT

## 2019-12-03 PROCEDURE — 85025 COMPLETE CBC W/AUTO DIFF WBC: CPT

## 2019-12-03 PROCEDURE — 92610 EVALUATE SWALLOWING FUNCTION: CPT

## 2019-12-03 PROCEDURE — 25000003 PHARM REV CODE 250: Performed by: EMERGENCY MEDICINE

## 2019-12-03 PROCEDURE — 83605 ASSAY OF LACTIC ACID: CPT

## 2019-12-03 RX ORDER — NAPROXEN SODIUM 220 MG/1
81 TABLET, FILM COATED ORAL DAILY
Status: DISCONTINUED | OUTPATIENT
Start: 2019-12-03 | End: 2019-12-09 | Stop reason: HOSPADM

## 2019-12-03 RX ADMIN — LEVETIRACETAM 500 MG: 500 TABLET ORAL at 08:12

## 2019-12-03 RX ADMIN — ENOXAPARIN SODIUM 30 MG: 100 INJECTION SUBCUTANEOUS at 05:12

## 2019-12-03 RX ADMIN — OXYBUTYNIN CHLORIDE 5 MG: 5 TABLET ORAL at 08:12

## 2019-12-03 RX ADMIN — VANCOMYCIN HYDROCHLORIDE 1250 MG: 1.25 INJECTION, POWDER, LYOPHILIZED, FOR SOLUTION INTRAVENOUS at 05:12

## 2019-12-03 RX ADMIN — ATORVASTATIN CALCIUM 80 MG: 40 TABLET, FILM COATED ORAL at 08:12

## 2019-12-03 RX ADMIN — ASPIRIN 81 MG 81 MG: 81 TABLET ORAL at 10:12

## 2019-12-03 RX ADMIN — TAMSULOSIN HYDROCHLORIDE 0.4 MG: 0.4 CAPSULE ORAL at 08:12

## 2019-12-03 RX ADMIN — CEFEPIME HYDROCHLORIDE 1 G: 1 INJECTION, SOLUTION INTRAVENOUS at 02:12

## 2019-12-03 RX ADMIN — OXYBUTYNIN CHLORIDE 5 MG: 5 TABLET ORAL at 02:12

## 2019-12-03 RX ADMIN — SODIUM CHLORIDE: 0.9 INJECTION, SOLUTION INTRAVENOUS at 01:12

## 2019-12-03 RX ADMIN — CEFEPIME HYDROCHLORIDE 1 G: 1 INJECTION, SOLUTION INTRAVENOUS at 01:12

## 2019-12-03 RX ADMIN — SODIUM CHLORIDE: 0.9 INJECTION, SOLUTION INTRAVENOUS at 05:12

## 2019-12-03 NOTE — PROGRESS NOTES
Pharmacokinetic Assessment Follow Up: IV Vancomycin    Vancomycin serum concentration assessment(s):    The random level was drawn correctly and can be used to guide therapy at this time. The measurement is below the desired definitive target range of 10 to 20 mcg/mL.    Vancomycin Regimen Plan:    Change regimen to Vancomycin 1250 mg IV every 24 hours with next serum trough concentration measured at 0530 prior to 3rd dose on 12/5/19     Drug levels (last 3 results):  Recent Labs   Lab Result Units 12/03/19  0327   Vancomycin, Random ug/mL 9.5       Pharmacy will continue to follow and monitor vancomycin.    Please contact pharmacy at extension [phone] for questions regarding this assessment.    Thank you for the consult,   Romaine Murillo       Patient brief summary:  Richard M Pipkins is a 64 y.o. male initiated on antimicrobial therapy with IV Vancomycin for treatment of bacteremia    The patient's current regimen is Vanco 1250mg q24h    Drug Allergies:   Review of patient's allergies indicates:   Allergen Reactions    Penicillins        Actual Body Weight:   86.9 kg    Renal Function:   Estimated Creatinine Clearance: 43.9 mL/min (A) (based on SCr of 1.7 mg/dL (H)).,     Dialysis Method (if applicable):  N/A    CBC (last 72 hours):  Recent Labs   Lab Result Units 12/02/19  1245 12/03/19  0327   WBC K/uL 25.79* 28.78*   Hemoglobin g/dL 16.0 13.9*   Hematocrit % 50.1 44.0   Platelets K/uL 423* 321   Gran% % 76.0* 82.3*   Lymph% % 4.0* 11.3*   Mono% % 2.0* 3.0*   Eosinophil% % 0.0 0.1   Basophil% % 0.0 0.3   Differential Method  Manual Automated       Metabolic Panel (last 72 hours):  Recent Labs   Lab Result Units 12/02/19  1245 12/02/19  1439 12/03/19  0327   Sodium mmol/L 144  --  143   Potassium mmol/L 3.4*  --  3.8   Chloride mmol/L 104  --  109   CO2 mmol/L 26  --  25   Glucose mg/dL 178*  --  84   Glucose, UA   --  Negative  --    BUN, Bld mg/dL 22  --  24*   Creatinine mg/dL 2.5*  --  1.7*   Albumin g/dL  3.9  --   --    Total Bilirubin mg/dL 0.7  --   --    Alkaline Phosphatase U/L 79  --   --    AST U/L 24  --   --    ALT U/L 36  --   --        Vancomycin Administrations:  vancomycin given in the last 96 hours                     vancomycin 1.25 g in dextrose 5% 250 mL IVPB (ready to mix) (mg) 1,250 mg New Bag 12/02/19 1608                      Microbiologic Results:  Microbiology Results (last 7 days)       Procedure Component Value Units Date/Time    Blood culture x two cultures. Draw prior to antibiotics. [[id number]] Collected:  12/02/19 1400    Order Status:  Completed Specimen:  Blood from Peripheral, Antecubital, Left Updated:  12/02/19 2112     Blood Culture, Routine No Growth to date    Narrative:       Aerobic and anaerobic    Blood culture x two cultures. Draw prior to antibiotics. [[id number]] Collected:  12/02/19 1405    Order Status:  Completed Specimen:  Blood from Peripheral, Hand, Right Updated:  12/02/19 2112     Blood Culture, Routine No Growth to date    Narrative:       Aerobic and anaerobic    Urine Culture - High Risk **CANNOT BE ORDERED STAT** [[id number]] Collected:  12/02/19 1439    Order Status:  Sent Specimen:  Urine Updated:  12/02/19 1451    Influenza A & B by Molecular [[id number]] Collected:  12/02/19 1443    Order Status:  Sent Specimen:  Nasopharyngeal Swab Updated:  12/02/19 1443

## 2019-12-03 NOTE — SUBJECTIVE & OBJECTIVE
Past Medical History:   Diagnosis Date    Diabetes mellitus     Hypertension     Seizures     Stroke        Past Surgical History:   Procedure Laterality Date    BRAIN SURGERY      ESOPHAGOGASTRODUODENOSCOPY N/A 3/11/2019    Procedure: EGD (ESOPHAGOGASTRODUODENOSCOPY);  Surgeon: Snehal Chavira MD;  Location: [facility];  Service: Endoscopy;  Laterality: N/A;    HERNIA REPAIR         Review of patient's allergies indicates:   Allergen Reactions    Penicillins        No current facility-administered medications on file prior to encounter.      Current Outpatient Medications on File Prior to Encounter   Medication Sig    amLODIPine (NORVASC) 5 MG tablet Take 5 mg by mouth once daily.     aspirin (ECOTRIN) 81 MG EC tablet Take 81 mg by mouth once daily.    atorvastatin (LIPITOR) 80 MG tablet Take 80 mg by mouth every evening.     clopidogrel (PLAVIX) 75 mg tablet Take 75 mg by mouth once.     ergocalciferol (VITAMIN D2) 50,000 unit Cap Take 50,000 Units by mouth every 7 days.     fish oil-omega-3 fatty acids 300-1,000 mg capsule Take by mouth once daily.    levETIRAcetam (KEPPRA) 500 MG Tab Take 500 mg by mouth 2 (two) times daily.     LOVAZA 1 gram capsule Take 1 g by mouth 2 (two) times daily.     metoprolol succinate (TOPROL-XL) 25 MG 24 hr tablet 12.5 mg.     MULTIVIT-IRON-MIN-FOLIC ACID 3,500-18-0.4 UNIT-MG-MG ORAL CHEW Take by mouth.    multivitamin capsule Take 1 capsule by mouth once daily.    pantoprazole (PROTONIX) 20 MG tablet Take 1 tablet (20 mg total) by mouth once daily.    VICTOZA 3-ANATOLIY 0.6 mg/0.1 mL (18 mg/3 mL) PnIj Inject 1.2 mg into the skin every morning.     ACCU-CHEK SHAN PLUS TEST STRP Strp     GI cocktail (mylanta 30 mL, lidocaine 2 % viscous 10 mL, dicyclomine 10 mL) 50 mL Take 50 mLs by mouth every 8 (eight) hours as needed.    mupirocin (BACTROBAN) 2 % ointment Apply topically once daily.    oxybutynin (DITROPAN) 5 MG Tab Take 5 mg by mouth 3 (three) times daily.      phenol/glycerin (CHLORASEPTIC MAX SORE THROAT MM) by Mucous Membrane route.    pravastatin (PRAVACHOL) 40 MG tablet Take 40 mg by mouth once daily.     QUEtiapine (SEROQUEL) 25 MG Tab     RANEXA 500 mg Tb12 Take 500 mg by mouth 2 (two) times daily.     sertraline (ZOLOFT) 100 MG tablet Take 100 mg by mouth once daily.     tamsulosin (FLOMAX) 0.4 mg Cp24 Take 0.4 mg by mouth once daily.      Family History     None        Tobacco Use    Smoking status: Never Smoker   Substance and Sexual Activity    Alcohol use: No     Alcohol/week: 0.0 standard drinks    Drug use: No    Sexual activity: Not on file     Review of Systems   Constitutional: Negative for chills and fever.   HENT: Negative for ear discharge and ear pain.    Eyes: Negative for pain and itching.   Respiratory: Negative for cough and shortness of breath.    Cardiovascular: Negative for chest pain and palpitations.   Gastrointestinal: Negative for abdominal distention and abdominal pain.   Endocrine: Negative for polyphagia and polyuria.   Genitourinary: Negative for dysuria and hematuria.   Musculoskeletal: Negative for neck pain and neck stiffness.   Skin: Negative for rash and wound.   Neurological: Positive for weakness. Negative for syncope.   Psychiatric/Behavioral: Negative for agitation and hallucinations.     Objective:     Vital Signs (Most Recent):  Temp: 98 °F (36.7 °C) (12/02/19 1740)  Pulse: 96 (12/02/19 1740)  Resp: (!) 22 (12/02/19 1740)  BP: 108/64 (12/02/19 1740)  SpO2: 97 % (12/02/19 1740) Vital Signs (24h Range):  Temp:  [98 °F (36.7 °C)-99.1 °F (37.3 °C)] 98 °F (36.7 °C)  Pulse:  [] 96  Resp:  [15-25] 22  SpO2:  [90 %-97 %] 97 %  BP: ()/(62-94) 108/64     Weight: 77.1 kg (170 lb)  Body mass index is 28.29 kg/m².    Physical Exam   Constitutional: No distress.   HENT:   Head: Normocephalic and atraumatic.   Eyes: Conjunctivae are normal. Right eye exhibits no discharge. Left eye exhibits no discharge.   Neck: Neck  supple. No tracheal deviation present.   Cardiovascular: Normal rate and regular rhythm.   Pulmonary/Chest: Effort normal and breath sounds normal.   Abdominal: Soft. Bowel sounds are normal.   Musculoskeletal: He exhibits no edema or deformity.   Neurological: He is alert. Coordination abnormal.   Left sided weakness   Skin: Skin is warm and dry. He is not diaphoretic.   Psychiatric: He is slowed. He is inattentive.           Significant Labs:   BMP:   Recent Labs   Lab 12/02/19  1245   *      K 3.4*      CO2 26   BUN 22   CREATININE 2.5*   CALCIUM 9.8     CBC:   Recent Labs   Lab 12/02/19  1245   WBC 25.79*   HGB 16.0   HCT 50.1   *       Significant Imaging: I have reviewed all pertinent imaging results/findings within the past 24 hours.

## 2019-12-03 NOTE — PLAN OF CARE
Patient on IV fluids and IV antibiotics. No skin breakdown assessed. SCD's in place. Awakens easily and becomes alert, follows commands and will speak one word answers to questions. Patient had not voided by 3am. Bladder scan showed 460. Dr. Ortega notified. In and out cath with 550 of dark yellow odorous urine noted. Last lactic was 4.0 and another drawn at 330am.

## 2019-12-03 NOTE — PROGRESS NOTES
Pharmacokinetic Initial Assessment: IV Vancomycin    Assessment/Plan:    Initiate intravenous vancomycin with loading dose of 1250 mg once with subsequent doses when random concentrations are less than 20 mcg/mL (pulse dose) due to serum creatinine higher than baseline    Desired empiric serum trough concentration is 15 to 20 mcg/mL     Draw vancomycin random level on 12/03/19 at 03:00.     Pharmacy will continue to follow and monitor vancomycin.      Please contact pharmacy at extension [phone] with any questions regarding this assessment.     Thank you for the consult,   Kaur David       Patient brief summary:  Richard M Pipkins is a 64 y.o. male initiated on antimicrobial therapy with IV Vancomycin for treatment of suspected bacteremia    Drug Allergies:   Review of patient's allergies indicates:   Allergen Reactions    Penicillins        Actual Body Weight:   77.1 kg     Renal Function:   Estimated Creatinine Clearance: 28.2 mL/min (A) (based on SCr of 2.5 mg/dL (H)).,     Dialysis Method (if applicable):  N/A    CBC (last 72 hours):  Recent Labs   Lab Result Units 12/02/19  1245   WBC K/uL 25.79*   Hemoglobin g/dL 16.0   Hematocrit % 50.1   Platelets K/uL 423*   Gran% % 76.0*   Lymph% % 4.0*   Mono% % 2.0*   Eosinophil% % 0.0   Basophil% % 0.0   Differential Method  Manual       Metabolic Panel (last 72 hours):  Recent Labs   Lab Result Units 12/02/19  1245 12/02/19  1439   Sodium mmol/L 144  --    Potassium mmol/L 3.4*  --    Chloride mmol/L 104  --    CO2 mmol/L 26  --    Glucose mg/dL 178*  --    Glucose, UA   --  Negative   BUN, Bld mg/dL 22  --    Creatinine mg/dL 2.5*  --    Albumin g/dL 3.9  --    Total Bilirubin mg/dL 0.7  --    Alkaline Phosphatase U/L 79  --    AST U/L 24  --    ALT U/L 36  --        Drug levels (last 3 results):  No results for input(s): VANCOMYCINRA, VANCOMYCINPE, VANCOMYCINTR in the last 72 hours.    Microbiologic Results:  Microbiology Results (last 7 days)     Procedure  Component Value Units Date/Time    Blood culture x two cultures. Draw prior to antibiotics. [[id number]] Collected:  12/02/19 1405    Order Status:  Sent Specimen:  Blood from Peripheral, Hand, Right Updated:  12/02/19 1456    Blood culture x two cultures. Draw prior to antibiotics. [[id number]] Collected:  12/02/19 1400    Order Status:  Sent Specimen:  Blood from Peripheral, Antecubital, Left Updated:  12/02/19 1455    Urine Culture - High Risk **CANNOT BE ORDERED STAT** [[id number]] Collected:  12/02/19 1439    Order Status:  Sent Specimen:  Urine Updated:  12/02/19 1451    Influenza A & B by Molecular [[id number]] Collected:  12/02/19 1443    Order Status:  Sent Specimen:  Nasopharyngeal Swab Updated:  12/02/19 1443

## 2019-12-03 NOTE — PLAN OF CARE
12/03/19 1200   Discharge Assessment   Assessment Type Discharge Planning Assessment   Confirmed/corrected address and phone number on facesheet? Yes   Assessment information obtained from? Caregiver   Prior to hospitilization cognitive status: Unable to Assess   Prior to hospitalization functional status: Completely Dependent   Current cognitive status: Unable to Assess   Current Functional Status: Completely Dependent   Facility Arrived From: home   Lives With spouse   Able to Return to Prior Arrangements yes   Is patient able to care for self after discharge? No   Who are your caregiver(s) and their phone number(s)? ashely [phone]   Patient's perception of discharge disposition home or selfcare;home health   Readmission Within the Last 30 Days no previous admission in last 30 days   Patient currently being followed by outpatient case management? No   Patient currently receives any other outside agency services? No   Equipment Currently Used at Home wheelchair;bedside commode;rollator;shower chair;glucometer   Do you have any problems affording any of your prescribed medications? No   Is the patient taking medications as prescribed? yes   Does the patient have transportation home? Yes   Transportation Anticipated family or friend will provide   Dialysis Name and Scheduled days N/A   Does the patient receive services at the [facility]? No   Discharge Plan A Home with family   Discharge Plan B Home with family;Home Health   DME Needed Upon Discharge  none   Patient/Family in Agreement with Plan yes       CRYSTAL met with pt and pt's family at bedside. SW explained her role in Care Management. Pt voiced understanding. SW inquired about HELP AT HOME. Pt stated that he will have Ashely at home to help for support. SW voiced understanding. SW inquired about responsibilities when it comes to  MANAGING HER/HIS HEALTH at home and what it entails. Pt inquired about details. SW informed pt of RESPONSIBILITIES  "of:    1. Follow up appointments  2. Getting Prescriptions filled  3. Taking medications as prescribed.     Pt voiced understanding.  SW explained "My Health Packet" blue folder and the pink and green tabs that are on the folder as well. Discharge Brochure given to pt with Care Team information. Pt voiced understanding.     Pt's pharmacy:  [facility] - [place] - [id number]  [id number]  [place] [id number]  Phone: [phone] Fax: [phone]      Pt's preference for appointments:      "

## 2019-12-03 NOTE — PT/OT/SLP EVAL
Speech Language Pathology Evaluation  Bedside Swallow    Patient Name:  Richard M Pipkins   MRN:  [id number]  Admitting Diagnosis: Generalized weakness    Recommendations:                 General Recommendations:  Follow-up not indicated  Diet recommendations:  Dental Soft, Thin   Aspiration Precautions: 1 bite/sip at a time HOB upright   General Precautions: Standard,    Communication strategies:  none    History:     Past Medical History:   Diagnosis Date    Diabetes mellitus     Hypertension     Seizures     Stroke        Past Surgical History:   Procedure Laterality Date    BRAIN SURGERY      ESOPHAGOGASTRODUODENOSCOPY N/A 3/11/2019    Procedure: EGD (ESOPHAGOGASTRODUODENOSCOPY);  Surgeon: Snehal Chavira MD;  Location: [facility];  Service: Endoscopy;  Laterality: N/A;    HERNIA REPAIR       Social History: Patient lives with wife    Modified Barium Swallow: n/a    Chest X-Rays: 12/2/19 The lungs are clear, with normal appearance of pulmonary vasculature and no pleural effusion or pneumothorax.    Prior diet: soft with thin    Subjective   Pt and family reporting pt does not have any issues with eating/swallowing. He was asked about globus sensation in upper esophagus which was documented during last admit, he reports he no longer gets that.   Patient goals: d/c home    Pain/Comfort:  · Pain Rating 1: 0/10    Objective:     Oral Musculature Evaluation  · Oral Musculature: WFL  · Dentition: present and adequate  · Mandibular Strength and Mobility: WFL  · Oral Labial Strength and Mobility: WFL  · Lingual Strength and Mobility: WFL  · Velar Elevation: WFL  · Buccal Strength and Mobility: WFL  · Voice Prior to PO Intake: wfl  · Oral Musculature Comments: decreased rate of movement    Bedside Swallow Eval:   Consistencies Assessed:  · Thin liquids 4oz multiple trials via straw  · Solids X2     Oral Phase:   · WFL    Pharyngeal Phase:   · no overt clinical signs/symptoms of aspiration    Compensatory  Strategies  · None    Treatment: please note silent aspiration cannot be r/o at bedside.     Assessment:     Richard M Pipkins is a 64 y.o. male with dx of generalized weakness, he presents with functional swallow at reported baseline level of function.     Goals:   Multidisciplinary Problems     SLP Goals     Not on file          Multidisciplinary Problems (Resolved)        Problem: SLP Goal    Goal Priority Disciplines Outcome   SLP Goal   (Resolved)    Low SLP Met   Description:  Pt will tolerate dental soft with thin liquids without overt s/s of aspiration                    Plan:     ·   · Plan of Care expires:  12/03/19  · Plan of Care reviewed with:  patient   · SLP Follow-Up:  No       Discharge recommendations:    no further ST is warranted  Barriers to Discharge:  None    Time Tracking:     SLP Treatment Date:   12/03/19  Speech Start Time:  1500  Speech Stop Time:  1510     Speech Total Time (min):  10 min    Billable Minutes: Eval Swallow and Oral Function 10    Jaquelin Flores CCC-SLP  12/03/2019

## 2019-12-03 NOTE — ASSESSMENT & PLAN NOTE
Patient presents with generalized weakness and apparent aphasia.  Evaluated for CVA in ER, but then noted to have significant leukocytosis and lactic acidosis.  Will do CVA work up with MRI of brain.  Neurology consult.  Unsure etiology of leukocytosis and lactic acidosis.  Slightly hypotensive on presentation and being admitted to ICU.  No obvious source of infection.  Empirically started on broad spectrum ABx's.  Patient does have hx of seizures.  Unsure if patient had seizure causing current symptoms and contributing to leukocytosis.

## 2019-12-03 NOTE — H&P
[facility] Hospital Medicine  History & Physical    Patient Name: Richard M Pipkins  MRN: [id number]  Admission Date: 12/2/2019  Attending Physician: Shai Chen MD   Primary Care Provider: Annalee Campos MD         Patient information was obtained from relative(s) and ER records.     Subjective:     Principal Problem:Generalized weakness    Chief Complaint:   Chief Complaint   Patient presents with    generalized weakness     Per EMS, pt family called reporting that pt has been having generalized weakness since approx 0600am        HPI: 63 y/o male with hx of CVA with left sided weakness presents to the ER via EMS for an evaluation of generalized weakness since this morning.  Patient could not walk on his own this morning and was not talking.  Patient's most recent known normal was 7pm last night when he went to bed.  He has chronic bilateral L>R weakness and chronic incontinence but has not had incontinence today. Patient's wife was concerned because he has not been mobile or talking today.  This is different from his baseline.  Patient was evaluated for possible stroke in ER.  Labs then showed significant leukocytosis and lactic acidosis.  Patient's wife denies any fevers, chills, cough, shortness of breath or any other complaints.    Past Medical History:   Diagnosis Date    Diabetes mellitus     Hypertension     Seizures     Stroke        Past Surgical History:   Procedure Laterality Date    BRAIN SURGERY      ESOPHAGOGASTRODUODENOSCOPY N/A 3/11/2019    Procedure: EGD (ESOPHAGOGASTRODUODENOSCOPY);  Surgeon: Snehal Chavira MD;  Location: [facility];  Service: Endoscopy;  Laterality: N/A;    HERNIA REPAIR         Review of patient's allergies indicates:   Allergen Reactions    Penicillins        No current facility-administered medications on file prior to encounter.      Current Outpatient Medications on File Prior to Encounter   Medication Sig    amLODIPine (NORVASC) 5 MG  tablet Take 5 mg by mouth once daily.     aspirin (ECOTRIN) 81 MG EC tablet Take 81 mg by mouth once daily.    atorvastatin (LIPITOR) 80 MG tablet Take 80 mg by mouth every evening.     clopidogrel (PLAVIX) 75 mg tablet Take 75 mg by mouth once.     ergocalciferol (VITAMIN D2) 50,000 unit Cap Take 50,000 Units by mouth every 7 days.     fish oil-omega-3 fatty acids 300-1,000 mg capsule Take by mouth once daily.    levETIRAcetam (KEPPRA) 500 MG Tab Take 500 mg by mouth 2 (two) times daily.     LOVAZA 1 gram capsule Take 1 g by mouth 2 (two) times daily.     metoprolol succinate (TOPROL-XL) 25 MG 24 hr tablet 12.5 mg.     MULTIVIT-IRON-MIN-FOLIC ACID 3,500-18-0.4 UNIT-MG-MG ORAL CHEW Take by mouth.    multivitamin capsule Take 1 capsule by mouth once daily.    pantoprazole (PROTONIX) 20 MG tablet Take 1 tablet (20 mg total) by mouth once daily.    VICTOZA 3-ANATOLIY 0.6 mg/0.1 mL (18 mg/3 mL) PnIj Inject 1.2 mg into the skin every morning.     ACCU-CHEK SHAN PLUS TEST STRP Strp     GI cocktail (mylanta 30 mL, lidocaine 2 % viscous 10 mL, dicyclomine 10 mL) 50 mL Take 50 mLs by mouth every 8 (eight) hours as needed.    mupirocin (BACTROBAN) 2 % ointment Apply topically once daily.    oxybutynin (DITROPAN) 5 MG Tab Take 5 mg by mouth 3 (three) times daily.     phenol/glycerin (CHLORASEPTIC MAX SORE THROAT MM) by Mucous Membrane route.    pravastatin (PRAVACHOL) 40 MG tablet Take 40 mg by mouth once daily.     QUEtiapine (SEROQUEL) 25 MG Tab     RANEXA 500 mg Tb12 Take 500 mg by mouth 2 (two) times daily.     sertraline (ZOLOFT) 100 MG tablet Take 100 mg by mouth once daily.     tamsulosin (FLOMAX) 0.4 mg Cp24 Take 0.4 mg by mouth once daily.      Family History     None        Tobacco Use    Smoking status: Never Smoker   Substance and Sexual Activity    Alcohol use: No     Alcohol/week: 0.0 standard drinks    Drug use: No    Sexual activity: Not on file     Review of Systems   Constitutional:  Negative for chills and fever.   HENT: Negative for ear discharge and ear pain.    Eyes: Negative for pain and itching.   Respiratory: Negative for cough and shortness of breath.    Cardiovascular: Negative for chest pain and palpitations.   Gastrointestinal: Negative for abdominal distention and abdominal pain.   Endocrine: Negative for polyphagia and polyuria.   Genitourinary: Negative for dysuria and hematuria.   Musculoskeletal: Negative for neck pain and neck stiffness.   Skin: Negative for rash and wound.   Neurological: Positive for weakness. Negative for syncope.   Psychiatric/Behavioral: Negative for agitation and hallucinations.     Objective:     Vital Signs (Most Recent):  Temp: 98 °F (36.7 °C) (12/02/19 1740)  Pulse: 96 (12/02/19 1740)  Resp: (!) 22 (12/02/19 1740)  BP: 108/64 (12/02/19 1740)  SpO2: 97 % (12/02/19 1740) Vital Signs (24h Range):  Temp:  [98 °F (36.7 °C)-99.1 °F (37.3 °C)] 98 °F (36.7 °C)  Pulse:  [] 96  Resp:  [15-25] 22  SpO2:  [90 %-97 %] 97 %  BP: ()/(62-94) 108/64     Weight: 77.1 kg (170 lb)  Body mass index is 28.29 kg/m².    Physical Exam   Constitutional: No distress.   HENT:   Head: Normocephalic and atraumatic.   Eyes: Conjunctivae are normal. Right eye exhibits no discharge. Left eye exhibits no discharge.   Neck: Neck supple. No tracheal deviation present.   Cardiovascular: Normal rate and regular rhythm.   Pulmonary/Chest: Effort normal and breath sounds normal.   Abdominal: Soft. Bowel sounds are normal.   Musculoskeletal: He exhibits no edema or deformity.   Neurological: He is alert. Coordination abnormal.   Left sided weakness   Skin: Skin is warm and dry. He is not diaphoretic.   Psychiatric: He is slowed. He is inattentive.           Significant Labs:   BMP:   Recent Labs   Lab 12/02/19  1245   *      K 3.4*      CO2 26   BUN 22   CREATININE 2.5*   CALCIUM 9.8     CBC:   Recent Labs   Lab 12/02/19  1245   WBC 25.79*   HGB 16.0   HCT 50.1    *       Significant Imaging: I have reviewed all pertinent imaging results/findings within the past 24 hours.    Assessment/Plan:     * Generalized weakness  Patient presents with generalized weakness and apparent aphasia.  Evaluated for CVA in ER, but then noted to have significant leukocytosis and lactic acidosis.  Will do CVA work up with MRI of brain.  Neurology consult.  Unsure etiology of leukocytosis and lactic acidosis.  Slightly hypotensive on presentation and being admitted to ICU.  No obvious source of infection.  Empirically started on broad spectrum ABx's.  Patient does have hx of seizures.  Unsure if patient had seizure causing current symptoms and contributing to leukocytosis.      Epilepsy  As above.      Hyperlipidemia  Continue Statin      BPH (benign prostatic hyperplasia)  Resume home medications.      ARF (acute renal failure)  Creat higher than baseline.  Unsure etiology.  IVF hydration.  Avoid nephrotoxic medications.      Lactic acidosis        Leukocytosis  As above.      Essential hypertension  Permissive HTN while ruling out CVA.      History of CVA (cerebrovascular accident)  With residual left sided weakness.    Type 2 diabetes mellitus, without long-term current use of insulin  Patient is currently NPO pending swallow evaluation.  Place on sliding scale.        VTE Risk Mitigation (From admission, onward)         Ordered     enoxaparin injection 30 mg  Daily      12/02/19 1838     IP VTE LOW RISK PATIENT  Once      12/02/19 1837     Place NNAMDI hose  Until discontinued      12/02/19 1837                   Pan Leach MD  Department of Hospital Medicine   [facility]

## 2019-12-03 NOTE — HPI
63 y/o male with hx of CVA with left sided weakness presents to the ER via EMS for an evaluation of generalized weakness since this morning.  Patient could not walk on his own this morning and was not talking.  Patient's most recent known normal was 7pm last night when he went to bed.  He has chronic bilateral L>R weakness and chronic incontinence but has not had incontinence today. Patient's wife was concerned because he has not been mobile or talking today.  This is different from his baseline.  Patient was evaluated for possible stroke in ER.  Labs then showed significant leukocytosis and lactic acidosis.  Patient's wife denies any fevers, chills, cough, shortness of breath or any other complaints.

## 2019-12-03 NOTE — PLAN OF CARE
Patient remains in ICU as a boarder, awaiting an available room. Per patient's wife he is back to baseline neuro status wise. Continues to have weakness, PT/OT/ST were consulted. Voiding in diaper. No skin breakdown. Plan of care and medications reviewed with patient and family at bedside. Diet started and tolerated well.

## 2019-12-03 NOTE — PROGRESS NOTES
Patient received from ER via stretcher. He is awake and alert, follows commands and answers questions yes or no. Patient does not speak except to answer yes and no. Patient has left sided weakness in arm and leg. Able to push my hand with left foot some, right side stronger. Squeezes my hand bilaterally but weaker on left side. Patient  Is incontinent and wears pull ups. Wife states normally walks with a walker at home.

## 2019-12-03 NOTE — PROGRESS NOTES
"eICU Note    Subjective: Weakness; high LA and sepsis suspected unclear source  Initial workup was for stroke - no new stroke rported      Objective:BP (!) 142/81   Pulse 84   Temp 99.4 °F (37.4 °C) (Oral)   Resp 17   Ht 5' 5" (1.651 m)   Wt 77.1 kg (170 lb)   SpO2 98%   BMI 28.29 kg/m²     Data review done   LA 4.5  WBC 25.7  Video review done  -pt resting    Assessment:Sepsis - source unclear    Plan:  1 LA trend- if not better to consider chest and abdomen further imaging   2 Oral pills- pt's family and RN feel able to swallow- if no abd pathology suspected can give -RN will review with bedside team   3 QI; ? Vol depletion - got fluids    DVT prophylaxis enoxaparin  GI prophylaxis na  Ventilator settings na    Communication with RN    0151 D/w Dr Ortega to assess any obvious source; LA recheck ordered    "

## 2019-12-04 PROBLEM — R53.81 DEBILITY: Status: ACTIVE | Noted: 2019-12-04

## 2019-12-04 LAB
ANION GAP SERPL CALC-SCNC: 9 MMOL/L (ref 8–16)
BACTERIA UR CULT: NO GROWTH
BASOPHILS # BLD AUTO: 0.04 K/UL (ref 0–0.2)
BASOPHILS NFR BLD: 0.2 % (ref 0–1.9)
BUN SERPL-MCNC: 17 MG/DL (ref 8–23)
CALCIUM SERPL-MCNC: 8.9 MG/DL (ref 8.7–10.5)
CHLORIDE SERPL-SCNC: 108 MMOL/L (ref 95–110)
CO2 SERPL-SCNC: 26 MMOL/L (ref 23–29)
CREAT SERPL-MCNC: 1.2 MG/DL (ref 0.5–1.4)
DIFFERENTIAL METHOD: ABNORMAL
EOSINOPHIL # BLD AUTO: 0.3 K/UL (ref 0–0.5)
EOSINOPHIL NFR BLD: 1.5 % (ref 0–8)
ERYTHROCYTE [DISTWIDTH] IN BLOOD BY AUTOMATED COUNT: 14.9 % (ref 11.5–14.5)
EST. GFR  (AFRICAN AMERICAN): >60 ML/MIN/1.73 M^2
EST. GFR  (NON AFRICAN AMERICAN): >60 ML/MIN/1.73 M^2
GLUCOSE SERPL-MCNC: 134 MG/DL (ref 70–110)
HCT VFR BLD AUTO: 40.4 % (ref 40–54)
HGB BLD-MCNC: 13.1 G/DL (ref 14–18)
IMM GRANULOCYTES # BLD AUTO: 0.1 K/UL (ref 0–0.04)
IMM GRANULOCYTES NFR BLD AUTO: 0.5 % (ref 0–0.5)
LYMPHOCYTES # BLD AUTO: 3.5 K/UL (ref 1–4.8)
LYMPHOCYTES NFR BLD: 17.3 % (ref 18–48)
MCH RBC QN AUTO: 29.6 PG (ref 27–31)
MCHC RBC AUTO-ENTMCNC: 32.4 G/DL (ref 32–36)
MCV RBC AUTO: 91 FL (ref 82–98)
MONOCYTES # BLD AUTO: 0.7 K/UL (ref 0.3–1)
MONOCYTES NFR BLD: 3.4 % (ref 4–15)
NEUTROPHILS # BLD AUTO: 15.4 K/UL (ref 1.8–7.7)
NEUTROPHILS NFR BLD: 77.1 % (ref 38–73)
NRBC BLD-RTO: 0 /100 WBC
PLATELET # BLD AUTO: 290 K/UL (ref 150–350)
PMV BLD AUTO: 10.9 FL (ref 9.2–12.9)
POCT GLUCOSE: 113 MG/DL (ref 70–110)
POCT GLUCOSE: 127 MG/DL (ref 70–110)
POCT GLUCOSE: 130 MG/DL (ref 70–110)
POTASSIUM SERPL-SCNC: 4.2 MMOL/L (ref 3.5–5.1)
PROCALCITONIN SERPL IA-MCNC: 5.44 NG/ML
RBC # BLD AUTO: 4.43 M/UL (ref 4.6–6.2)
SODIUM SERPL-SCNC: 143 MMOL/L (ref 136–145)
WBC # BLD AUTO: 19.96 K/UL (ref 3.9–12.7)

## 2019-12-04 PROCEDURE — 84145 PROCALCITONIN (PCT): CPT

## 2019-12-04 PROCEDURE — 80048 BASIC METABOLIC PNL TOTAL CA: CPT

## 2019-12-04 PROCEDURE — 94761 N-INVAS EAR/PLS OXIMETRY MLT: CPT

## 2019-12-04 PROCEDURE — 25000003 PHARM REV CODE 250: Performed by: PSYCHIATRY & NEUROLOGY

## 2019-12-04 PROCEDURE — 99233 SBSQ HOSP IP/OBS HIGH 50: CPT | Mod: ,,, | Performed by: INTERNAL MEDICINE

## 2019-12-04 PROCEDURE — 99232 SBSQ HOSP IP/OBS MODERATE 35: CPT | Mod: ,,, | Performed by: PSYCHIATRY & NEUROLOGY

## 2019-12-04 PROCEDURE — 11000001 HC ACUTE MED/SURG PRIVATE ROOM

## 2019-12-04 PROCEDURE — 97166 OT EVAL MOD COMPLEX 45 MIN: CPT

## 2019-12-04 PROCEDURE — 99233 PR SUBSEQUENT HOSPITAL CARE,LEVL III: ICD-10-PCS | Mod: ,,, | Performed by: INTERNAL MEDICINE

## 2019-12-04 PROCEDURE — 97530 THERAPEUTIC ACTIVITIES: CPT

## 2019-12-04 PROCEDURE — 97535 SELF CARE MNGMENT TRAINING: CPT

## 2019-12-04 PROCEDURE — 86580 TB INTRADERMAL TEST: CPT | Performed by: INTERNAL MEDICINE

## 2019-12-04 PROCEDURE — 36415 COLL VENOUS BLD VENIPUNCTURE: CPT

## 2019-12-04 PROCEDURE — 27000221 HC OXYGEN, UP TO 24 HOURS

## 2019-12-04 PROCEDURE — 63600175 PHARM REV CODE 636 W HCPCS: Performed by: HOSPITALIST

## 2019-12-04 PROCEDURE — 97161 PT EVAL LOW COMPLEX 20 MIN: CPT

## 2019-12-04 PROCEDURE — 85025 COMPLETE CBC W/AUTO DIFF WBC: CPT

## 2019-12-04 PROCEDURE — 99232 PR SUBSEQUENT HOSPITAL CARE,LEVL II: ICD-10-PCS | Mod: ,,, | Performed by: PSYCHIATRY & NEUROLOGY

## 2019-12-04 PROCEDURE — 25000003 PHARM REV CODE 250: Performed by: HOSPITALIST

## 2019-12-04 PROCEDURE — 30200315 PPD INTRADERMAL TEST REV CODE 302: Performed by: INTERNAL MEDICINE

## 2019-12-04 RX ORDER — LEVETIRACETAM 100 MG/ML
750 SOLUTION ORAL 2 TIMES DAILY
Status: DISCONTINUED | OUTPATIENT
Start: 2019-12-04 | End: 2019-12-09 | Stop reason: HOSPADM

## 2019-12-04 RX ADMIN — OXYBUTYNIN CHLORIDE 5 MG: 5 TABLET ORAL at 03:12

## 2019-12-04 RX ADMIN — ASPIRIN 81 MG 81 MG: 81 TABLET ORAL at 08:12

## 2019-12-04 RX ADMIN — ENOXAPARIN SODIUM 30 MG: 100 INJECTION SUBCUTANEOUS at 06:12

## 2019-12-04 RX ADMIN — OXYBUTYNIN CHLORIDE 5 MG: 5 TABLET ORAL at 08:12

## 2019-12-04 RX ADMIN — LEVETIRACETAM 750 MG: 500 SOLUTION ORAL at 08:12

## 2019-12-04 RX ADMIN — TAMSULOSIN HYDROCHLORIDE 0.4 MG: 0.4 CAPSULE ORAL at 08:12

## 2019-12-04 RX ADMIN — SODIUM CHLORIDE: 0.9 INJECTION, SOLUTION INTRAVENOUS at 11:12

## 2019-12-04 RX ADMIN — ATORVASTATIN CALCIUM 80 MG: 40 TABLET, FILM COATED ORAL at 08:12

## 2019-12-04 RX ADMIN — Medication 5 UNITS: at 03:12

## 2019-12-04 RX ADMIN — SODIUM CHLORIDE: 0.9 INJECTION, SOLUTION INTRAVENOUS at 08:12

## 2019-12-04 RX ADMIN — SODIUM CHLORIDE: 0.9 INJECTION, SOLUTION INTRAVENOUS at 01:12

## 2019-12-04 RX ADMIN — CEFEPIME HYDROCHLORIDE 1 G: 1 INJECTION, SOLUTION INTRAVENOUS at 03:12

## 2019-12-04 NOTE — PROGRESS NOTES
2030 Report called to ROXANA Laguerre on med-surg.    2045 Patient to room 441A via bed with O2 2L NC in place. Belongings sent with patient. IV fluids clamped for transport. Left wife Ashely voicemail with new room number.

## 2019-12-04 NOTE — HPI
This is a 64 year old male with PMH of CVA (residual left sided weakness), DM, epilepsy, and HTN who came in with generalized weakness. Per chart review, patient's family stated he could not walk on his own and was also aphasic. Pt was admitted to rule out stroke. Labs were significant for leukocytosis, hypotension and lactic acidosis. He was afebrile. Pt was started on empiric antibiotics given these findings. Blood cultures were sent and NGTD. UA was obtained and was negative.     CT head was negative. MRI/MRA brain negative. CXR negative.    No family at bedside. Upon questioning, pt reports continued weakness. He denies known seizure activity. He denies fevers at home. No cough, sore throat, or sick contacts. Denies rash. No abdominal pain or watery diarrhea. Only complaint is burning with urination. Denies other urinary symptoms.     Neurology was consulted and thought pt may have had seizure activity which contributed to lactic acidosis/leukocytosis.

## 2019-12-04 NOTE — PT/OT/SLP EVAL
Occupational Therapy   Evaluation and Treatment    Name: Richard M Pipkins  MRN: [id number]  Admitting Diagnosis:  Generalized weakness      Recommendations:     Discharge Recommendations: nursing facility, skilled  Discharge Equipment Recommendations:  (Ongoing assessment pending pt progress.  may need Franky-walker vs Quad cane for ambulation)  Barriers to discharge:  Decreased caregiver support    Assessment:     Richard M Pipkins is a 64 y.o. male with a medical diagnosis of Generalized weakness.  He presents with decreased functional activity tolerance with decreased independence with ADLs and functional mobility. Performance deficits affecting function: weakness, impaired self care skills, impaired balance, decreased safety awareness, decreased ROM, impaired endurance, impaired functional mobilty, decreased upper extremity function, impaired fine motor, impaired cognition, decreased lower extremity function, gait instability.      Rehab Prognosis: fair +; patient would benefit from acute skilled OT services to address these deficits and reach maximum level of function.       Plan:     Patient to be seen 5 x/week to address the above listed problems via self-care/home management, therapeutic activities, therapeutic exercises  · Plan of Care Expires: 12/18/19  · Plan of Care Reviewed with: patient, spouse    Subjective     Chief Complaint: pt was soiled through his gown and sheets upon OT entry   Patient/Family Comments/goals: family wants SNF at d/c     Occupational Profile:  Living Environment: Pt lives with his wife in a SS house with 0 steps at entry. Bathroom set-up: walk-in shower with shower chair.   Previous level of function: Pt was ambulatory with a rollator and using a wheelchair only for Jain (d/t slow speed). Pt was able to feed himself and assist with ADLs. Pt is incontinent and unable to feel when he is soiled/urge. Pt's services have been canceled, but prior to admission, pt had a caregiver help  daily with showers and went to a [facility] 5 mornings a week.   Roles and Routines: goes to [facility]  Equipment Used at Home:  wheelchair, rollator, bedside commode, shower chair, glucometer  Assistance upon Discharge: family (limited by wife- works and has arthirits/bad back and not able to physically assist pt to his needs; pt's mother-in-law limited d/t elderly)     Pain/Comfort:  Pain Rating 1: 0/10    Patients cultural, spiritual, Adventist conflicts given the current situation: no    Objective:     Communicated with: nurseLucina, prior to session.  Patient found HOB elevated with pressure relief boots, bed alarm, oxygen, peripheral IV upon OT entry to room.    General Precautions: Standard, fall   Orthopedic Precautions:N/A   Braces: N/A     Occupational Performance:    Bed Mobility:    · Patient completed Rolling/Turning to Left with  moderate assistance  · Patient completed Scooting/Bridging with minimum assistance  · Patient completed Supine to Sit with moderate assistance  · Patient completed Sit to Supine with moderate assistance and with leg lift    Functional Mobility/Transfers:  · Patient completed Sit <> Stand Transfer with minimum assistance  with  rolling walker   · Functional Mobility: Pt stood multiple times and tolerated static standing balance for ~3 min on 4 attempts with rest breaks in between to assist with pericare d/t pt soiled. Pt took~4 sidesteps with MIN A to assist with maintain L  onto RW. Pt required frequent cueing d/t rounded shoulders and forward head.     Activities of Daily Living:  · Upper Body Dressing: moderate assistance to jnaine L UE through sleeve and change soiled gowns  · Lower Body Dressing: maximal assistance to change brief  · Toileting: total assistance with pericare as pt stood with CGA and RW    Cognitive/Visual Perceptual:  Cognitive/Psychosocial Skills:     -       Oriented to: Person and able to read [facility] logo to then recall place   -        Follows Commands/attention:Follows two-step commands  -       Communication: pt spoke when spoken to directly; slow to verbalize   -       Memory: Impaired STM and Poor immediate recall  -       Safety awareness/insight to disability: impaired   -       Mood/Affect/Coping skills/emotional control: Cooperative and Pleasant  Visual/Perceptual:      -Intact  acuity and R/L discrimination      Physical Exam:  Balance:    -       seated: SBA; standing: CGA/MIN A with RW  Postural examination/scapula alignment:    -       Rounded shoulders  -       Forward head  -       residual L hemiparesis   Skin integrity: Visible skin intact  Edema:  no BUE edema noted  Sensation:    -       Intact  light/touch BUE  Dominant hand:    -       Right  Upper Extremity Range of Motion:     -       Right Upper Extremity: WFL  -       Left Upper Extremity: Deficits: digits flexion/extension WFL; wrist flexion>extension, elbow flexion>extension, limited to no sholder flexion, internal> external rotation   Upper Extremity Strength:    -       Right Upper Extremity: WFL  -       Left Upper Extremity: pt able to assist some through bed mobility/functional tasks to LUE   Strength:    -       Right Upper Extremity: WFL  -       Left Upper Extremity: able to assist with opening containers   Fine Motor Coordination:    -       Intact  Right hand, manipulation of objects  -       Impaired  Left hand, manipulation of objects    Gross motor coordination:   impaired; L side hemiparesis    AMPAC 6 Click ADL:  AMPAC Total Score: 14    Treatment & Education:  · Pt educated on OT role/POC.   · Importance of OOB activity with staff assistance.  · Safety during functional t/f and mobility with use of RW and proper body mechanics    · In unsupported sit, pt completed the following exercises with LUE 1x15:   · Digits flexion/extension AROM  · Wrist flexion/extension AAROM by self   · Forearm supination/pronation AAROM by OT   · Elbow flexion/extension with  AAROM by self   · Shoulder ab/dduction AAROM by self with limited ROM  · White board updated   · Multiple self-care tasks/functional mobility completed- assistance level noted above   · Increased time taken with functional transfers and standing tolerance in order to change his linens and clean pt d/t completely soiled   · All questions/concerns answered within OT scope of practice     Education:    Patient left right sidelying with all lines intact, call button in reach, bed alarm on, nurse, GLEN Verdugo, Veronica, and wife notified and lap tray within reach and pressure relief boots on     GOALS:   Multidisciplinary Problems     Occupational Therapy Goals        Problem: Occupational Therapy Goal    Goal Priority Disciplines Outcome Interventions   Occupational Therapy Goal     OT, PT/OT Ongoing, Progressing    Description:  Goals to be met by: 12/18/19     Patient will increase functional independence with ADLs by performing:    Feeding with Set-up Assistance.  UE Dressing with Set-up Assistance.  LE Dressing with Contact Guard Assistance.  Grooming while standing at sink with Stand-by Assistance.  Toileting from bedside commode with Stand-by Assistance for hygiene and clothing management.   Supine to sit with Contact Guard Assistance.  Step transfer with Stand-by Assistance  Toilet transfer to bedside commode with Stand-by Assistance.  Upper extremity exercise program x15 reps per handout, with supervision.                      History:     Past Medical History:   Diagnosis Date    Diabetes mellitus     Hypertension     Seizures     Stroke          Past Surgical History:   Procedure Laterality Date    BRAIN SURGERY      ESOPHAGOGASTRODUODENOSCOPY N/A 3/11/2019    Procedure: EGD (ESOPHAGOGASTRODUODENOSCOPY);  Surgeon: Snehal Chavira MD;  Location: [facility];  Service: Endoscopy;  Laterality: N/A;    HERNIA REPAIR         Time Tracking:     OT Date of Treatment: 12/04/19  OT Start Time: 0950  OT Stop Time:  1045  OT Total Time (min): 55 min    Billable Minutes:Evaluation 15 min  Self Care/Home Management 15 min  Therapeutic Activity 25 min  Total Time 55 min    Tamiko Barron OT  12/4/2019

## 2019-12-04 NOTE — CONSULTS
[facility]  Infectious Disease  Consult Note    Patient Name: Richard M Pipkins  MRN: [id number]  Admission Date: 12/2/2019  Hospital Length of Stay: 2 days  Attending Physician: Elvis Weeks MD  Primary Care Provider: Annalee Campos MD     Isolation Status: No active isolations    Patient information was obtained from patient and past medical records.      Inpatient consult to Infectious Diseases  Consult performed by: SURAJ Huff  Consult ordered by: Elvis Weeks MD    Inpatient consult to Infectious Diseases  Consult performed by: SURAJ Huff  Consult ordered by: Cathy Parmar MD        Assessment/Plan:     Leukocytosis  64 yr old with history of CVA, epilepsy, HTN, and DM presents with generalized weakness and difficulty speaking. Admitted to r/o CVA. On admit, found to have hypotension, leukocytosis, and lactic acidosis. Infectious work up initiated. Empiric antibiotics were started. ID now consulted for further recs.    Blood cultures NGTD  UA and urine culture negative  CXR negative  CT head/MRI/MRA brain negative.     Neurology following and suspicious for seizure activity contributing to leukocytosis/lactic acidosis.    Plan:  1. Would stop antibiotics and monitor pt off  2. Baseline procalcitonin was ordered; could repeat tomorrow to ensure not increasing  3. Will follow      Thank you for your consult. I will follow-up with patient. Please contact us if you have any additional questions.  SURAJ Roblero Pager: [phone]  Infectious Disease  [facility]    Subjective:     Principal Problem: Generalized weakness    HPI: This is a 64 year old male with PMH of CVA (residual left sided weakness), DM, epilepsy, and HTN who came in with generalized weakness. Per chart review, patient's family stated he could not walk on his own and was also aphasic. Pt was admitted to rule out stroke. Labs were significant for leukocytosis, hypotension and  lactic acidosis. He was afebrile. Pt was started on empiric antibiotics given these findings. Blood cultures were sent and NGTD. UA was obtained and was negative.     CT head was negative. MRI/MRA brain negative. CXR negative.    No family at bedside. Upon questioning, pt reports continued weakness. He denies known seizure activity. He denies fevers at home. No cough, sore throat, or sick contacts. Denies rash. No abdominal pain or watery diarrhea. Only complaint is burning with urination. Denies other urinary symptoms.     Neurology was consulted and thought pt may have had seizure activity which contributed to lactic acidosis/leukocytosis.       Past Medical History:   Diagnosis Date    Diabetes mellitus     Hypertension     Seizures     Stroke        Past Surgical History:   Procedure Laterality Date    BRAIN SURGERY      ESOPHAGOGASTRODUODENOSCOPY N/A 3/11/2019    Procedure: EGD (ESOPHAGOGASTRODUODENOSCOPY);  Surgeon: Snehal Chavira MD;  Location[facility];  Service: Endoscopy;  Laterality: N/A;    HERNIA REPAIR         Review of patient's allergies indicates:   Allergen Reactions    Penicillins        Medications:  Medications Prior to Admission   Medication Sig    amLODIPine (NORVASC) 5 MG tablet Take 5 mg by mouth once daily.     aspirin (ECOTRIN) 81 MG EC tablet Take 81 mg by mouth once daily.    atorvastatin (LIPITOR) 80 MG tablet Take 80 mg by mouth every evening.     clopidogrel (PLAVIX) 75 mg tablet Take 75 mg by mouth once.     ergocalciferol (VITAMIN D2) 50,000 unit Cap Take 50,000 Units by mouth every 7 days.     fish oil-omega-3 fatty acids 300-1,000 mg capsule Take by mouth once daily.    levETIRAcetam (KEPPRA) 500 MG Tab Take 500 mg by mouth 2 (two) times daily.     LOVAZA 1 gram capsule Take 1 g by mouth 2 (two) times daily.     metoprolol succinate (TOPROL-XL) 25 MG 24 hr tablet 12.5 mg.     MULTIVIT-IRON-MIN-FOLIC ACID 3,500-18-0.4 UNIT-MG-MG ORAL CHEW Take by mouth.     multivitamin capsule Take 1 capsule by mouth once daily.    pantoprazole (PROTONIX) 20 MG tablet Take 1 tablet (20 mg total) by mouth once daily.    VICTOZA 3-ANATOLIY 0.6 mg/0.1 mL (18 mg/3 mL) PnIj Inject 1.2 mg into the skin every morning.     ACCU-CHEK SHAN PLUS TEST STRP Strp     GI cocktail (mylanta 30 mL, lidocaine 2 % viscous 10 mL, dicyclomine 10 mL) 50 mL Take 50 mLs by mouth every 8 (eight) hours as needed.    mupirocin (BACTROBAN) 2 % ointment Apply topically once daily.    oxybutynin (DITROPAN) 5 MG Tab Take 5 mg by mouth 3 (three) times daily.     phenol/glycerin (CHLORASEPTIC MAX SORE THROAT MM) by Mucous Membrane route.    pravastatin (PRAVACHOL) 40 MG tablet Take 40 mg by mouth once daily.     QUEtiapine (SEROQUEL) 25 MG Tab     RANEXA 500 mg Tb12 Take 500 mg by mouth 2 (two) times daily.     sertraline (ZOLOFT) 100 MG tablet Take 100 mg by mouth once daily.     tamsulosin (FLOMAX) 0.4 mg Cp24 Take 0.4 mg by mouth once daily.      Antibiotics (From admission, onward)    Start     Stop Route Frequency Ordered    12/03/19 0245  cefepime in dextrose 5 % 1 gram/50 mL IVPB 1 g      -- IV Every 12 hours (non-standard times) 12/02/19 1553        Antifungals (From admission, onward)    None        Antivirals (From admission, onward)    None           There is no immunization history for the selected administration types on file for this patient.    Family History     None        Social History     Socioeconomic History    Marital status:      Spouse name: Not on file    Number of children: Not on file    Years of education: Not on file    Highest education level: Not on file   Occupational History    Not on file   Social Needs    Financial resource strain: Not on file    Food insecurity:     Worry: Not on file     Inability: Not on file    Transportation needs:     Medical: Not on file     Non-medical: Not on file   Tobacco Use    Smoking status: Never Smoker   Substance and Sexual  Activity    Alcohol use: No     Alcohol/week: 0.0 standard drinks    Drug use: No    Sexual activity: Not on file   Lifestyle    Physical activity:     Days per week: Not on file     Minutes per session: Not on file    Stress: Not on file   Relationships    Social connections:     Talks on phone: Not on file     Gets together: Not on file     Attends Mormonism service: Not on file     Active member of club or organization: Not on file     Attends meetings of clubs or organizations: Not on file     Relationship status: Not on file   Other Topics Concern    Not on file   Social History Narrative    Not on file     Review of Systems   Constitutional: Positive for activity change. Negative for chills and fever.   HENT: Negative for congestion.    Respiratory: Negative for cough and shortness of breath.    Cardiovascular: Negative for chest pain and leg swelling.   Gastrointestinal: Negative for abdominal pain, constipation, diarrhea, nausea and vomiting.   Genitourinary: Positive for dysuria. Negative for flank pain.   Musculoskeletal: Negative for arthralgias and back pain.   Skin: Negative for rash and wound.   Neurological: Positive for speech difficulty and weakness. Negative for dizziness, facial asymmetry and numbness.     Objective:     Vital Signs (Most Recent):  Temp: 98.3 °F (36.8 °C) (12/04/19 1118)  Pulse: 85 (12/04/19 1118)  Resp: 18 (12/04/19 1118)  BP: (!) 145/76 (12/04/19 1118)  SpO2: (!) 94 % (12/04/19 1150) Vital Signs (24h Range):  Temp:  [97.8 °F (36.6 °C)-99.1 °F (37.3 °C)] 98.3 °F (36.8 °C)  Pulse:  [73-96] 85  Resp:  [13-24] 18  SpO2:  [93 %-100 %] 94 %  BP: (123-169)/() 145/76     Weight: 86.9 kg (191 lb 9.3 oz)  Body mass index is 31.88 kg/m².    Estimated Creatinine Clearance: 62.2 mL/min (based on SCr of 1.2 mg/dL).    Physical Exam   Constitutional: He appears well-developed. No distress.   HENT:   Head: Normocephalic and atraumatic.   Eyes: Conjunctivae are normal.   Neck: Neck  supple.   Cardiovascular: Normal rate and regular rhythm.   Pulmonary/Chest: Effort normal and breath sounds normal.   Abdominal: Soft. Bowel sounds are normal.   Musculoskeletal: He exhibits no edema or deformity.   Neurological: He is alert. Coordination abnormal.   Left sided weakness   Skin: Skin is warm and dry. He is not diaphoretic.   Psychiatric: He is slowed. He is attentive.       Significant Labs:   Blood Culture:   Recent Labs   Lab 12/02/19  1400 12/02/19  1405   LABBLOO No Growth to date  No Growth to date No Growth to date  No Growth to date     CBC:   Recent Labs   Lab 12/02/19  1245 12/03/19  0327 12/04/19  0421   WBC 25.79* 28.78* 19.96*   HGB 16.0 13.9* 13.1*   HCT 50.1 44.0 40.4   * 321 290     CMP:   Recent Labs   Lab 12/02/19  1245 12/03/19  0327 12/04/19  0421    143 143   K 3.4* 3.8 4.2    109 108   CO2 26 25 26   * 84 134*   BUN 22 24* 17   CREATININE 2.5* 1.7* 1.2   CALCIUM 9.8 8.6* 8.9   PROT 7.9  --   --    ALBUMIN 3.9  --   --    BILITOT 0.7  --   --    ALKPHOS 79  --   --    AST 24  --   --    ALT 36  --   --    ANIONGAP 14 9 9   EGFRNONAA 26* 41* >60     Urine Culture:   Recent Labs   Lab 12/02/19  1439   LABURIN No growth     Urine Studies:   Recent Labs   Lab 12/02/19  1439   COLORU Aline   APPEARANCEUA Hazy*   PHUR 5.0   SPECGRAV 1.020   PROTEINUA 2+*   GLUCUA Negative   KETONESU Negative   BILIRUBINUA Negative   OCCULTUA Negative   NITRITE Negative   UROBILINOGEN 4.0-6.0*   LEUKOCYTESUR Negative   RBCUA 0   WBCUA 8*   BACTERIA None   HYALINECASTS 60*       Significant Imaging: I have reviewed all pertinent imaging results/findings within the past 24 hours.

## 2019-12-04 NOTE — ASSESSMENT & PLAN NOTE
64 yr old with history of CVA, epilepsy, HTN, and DM presents with generalized weakness and difficulty speaking. Admitted to r/o CVA. On admit, found to have hypotension, leukocytosis, and lactic acidosis. Infectious work up initiated. Empiric antibiotics were started. ID now consulted for further recs.    Blood cultures NGTD  UA and urine culture negative  CXR negative  CT head/MRI/MRA brain negative.     Neurology following and suspicious for seizure activity contributing to leukocytosis/lactic acidosis.    Plan:  1. Would stop antibiotics and monitor pt off  2. Baseline procalcitonin was ordered; could repeat tomorrow to ensure not increasing  3. Will follow

## 2019-12-04 NOTE — PLAN OF CARE
Problem: Fall Injury Risk  Goal: Absence of Fall and Fall-Related Injury  Outcome: Ongoing, Progressing  Intervention: Identify and Manage Contributors to Fall Injury Risk  Flowsheets (Taken 12/4/2019 1713)  Self-Care Promotion: independence encouraged; BADL personal objects within reach; BADL personal routines maintained; meal setup provided  Medication Review/Management: medications reviewed; high risk medications identified     Problem: Adult Inpatient Plan of Care  Goal: Plan of Care Review  Outcome: Ongoing, Progressing  Flowsheets (Taken 12/4/2019 1713)  Plan of Care Reviewed With: patient  Goal: Patient-Specific Goal (Individualization)  Outcome: Ongoing, Progressing  Goal: Absence of Hospital-Acquired Illness or Injury  Outcome: Ongoing, Progressing  Intervention: Identify and Manage Fall Risk  Flowsheets (Taken 12/4/2019 1713)  Safety Promotion/Fall Prevention: assistive device/personal item within reach; bed alarm set; high risk medications identified; room near unit station; nonskid shoes/socks when out of bed; medications reviewed; instructed to call staff for mobility; side rails raised x 2  Goal: Optimal Comfort and Wellbeing  Outcome: Ongoing, Progressing  Goal: Readiness for Transition of Care  Outcome: Ongoing, Progressing  Goal: Rounds/Family Conference  Outcome: Ongoing, Progressing     Problem: Diabetes Comorbidity  Goal: Blood Glucose Level Within Desired Range  Outcome: Ongoing, Progressing     Problem: Skin Injury Risk Increased  Goal: Skin Health and Integrity  Outcome: Ongoing, Progressing  Intervention: Optimize Skin Protection  Flowsheets (Taken 12/4/2019 1713)  Pressure Reduction Techniques: heels elevated off bed; pressure points protected; weight shift assistance provided  Pressure Reduction Devices: foam padding utilized; heel offloading device utilized  Head of Bed (HOB): HOB at 30-45 degrees     Problem: Infection  Goal: Infection Symptom Resolution  Outcome: Ongoing,  Progressing  Intervention: Prevent or Manage Infection  Flowsheets (Taken 12/4/2019 3958)  Infection Management: aseptic technique maintained   Pt remains free from falls and pressure injuries,able to make needs known,lalitha meds well,iv abx remains in progress,no s/s reaction noted,requires extensive assist with adl's,repositioned q2hrs,safety maintained,continue monitoring.

## 2019-12-04 NOTE — ASSESSMENT & PLAN NOTE
Creatinine higher than baseline which is somewhere between 1.1 to 1.4  Unsure etiology, but likely pre renal  Renal function improving with IV hydration alone  Avoid nephrotoxic medications.  Continue IV fluids   Repeat laboratory testing in a.m.    Resolved

## 2019-12-04 NOTE — NURSING
Patient abed resting quietly. No c/o voiced. Patient turned every two hours and incont care provided for incontinence of urine. Call light in reach.

## 2019-12-04 NOTE — PROGRESS NOTES
CRYSTAL went to patient's room to introduce herself and to write contact information on the board. Patient currently working with PT/OT. Patient's mother in law was present. CRYSTAL informed mother in law that she will return later to discuss SNF options. CRYSTAL contacted patient's wife via phone and introduced herself. CRYSTAL inquired if wife was aware that patient will need SNF and if she had a preference in facilities. Wife stated that she would prefer patient go [place] [facility] Wife stated that she was not familiar with any facilities and would have to call someone. CRYSTAL informed wife that she will leave two list in patient's room, one with facilities with CMS ratings and one from insurance provider. Wife stated that she will be there at 6:00 pm and take a look at it then. Wife stated that she will call CRYSTAL with her choices.

## 2019-12-04 NOTE — PROGRESS NOTES
[facility] Hospital Medicine  Progress Note    Patient Name: Richard M Pipkins  MRN: [id number]  Patient Class: IP- Inpatient   Admission Date: 12/2/2019  Length of Stay: 2 days  Attending Physician: Cathy Parmar MD  Primary Care Provider: Annalee Campos MD        Subjective:     Principal Problem:Generalized weakness        HPI:  65 y/o male with hx of CVA with left sided weakness presents to the ER via EMS for an evaluation of generalized weakness since this morning.  Patient could not walk on his own this morning and was not talking.  Patient's most recent known normal was 7pm last night when he went to bed.  He has chronic bilateral L>R weakness and chronic incontinence but has not had incontinence today. Patient's wife was concerned because he has not been mobile or talking today.  This is different from his baseline.  Patient was evaluated for possible stroke in ER.  Labs then showed significant leukocytosis and lactic acidosis.  Patient's wife denies any fevers, chills, cough, shortness of breath or any other complaints.    Overview/Hospital Course:  Mr. Pipkins presented with generalized weakness and changes in activity level.  Initial head CT unremarkable.  He was also noted to have leukocytosis, hypotension and lactic acidosis with no obvious source of infection.  Blood and urine cultures obtained.  Empiric antibiotics of cefepime and vancomycin initiated.  Neurology consulted.    Interval History:  Family at bedside reports he is back to his baseline, he is sitting up in bed eating without difficulty.  No acute events overnight.  Blood pressure in normal range.    Review of Systems   Constitutional: Negative for chills and fever.   Respiratory: Negative for shortness of breath.    Cardiovascular: Negative for chest pain.     Objective:     Vital Signs (Most Recent):  Temp: 98.2 °F (36.8 °C) (12/03/19 2056)  Pulse: 81 (12/03/19 2056)  Resp: 14 (12/03/19 2056)  BP: (!) 129/90  (12/03/19 2056)  SpO2: 98 % (12/03/19 2056) Vital Signs (24h Range):  Temp:  [97.8 °F (36.6 °C)-99.1 °F (37.3 °C)] 98.2 °F (36.8 °C)  Pulse:  [70-96] 81  Resp:  [11-25] 14  SpO2:  [94 %-100 %] 98 %  BP: (101-167)/(60-90) 129/90     Weight: 86.9 kg (191 lb 9.3 oz)  Body mass index is 31.88 kg/m².    Intake/Output Summary (Last 24 hours) at 12/3/2019 2316  Last data filed at 12/3/2019 2000  Gross per 24 hour   Intake 3450 ml   Output 500 ml   Net 2950 ml      Physical Exam   Constitutional: No distress.   HENT:   Head: Normocephalic and atraumatic.   Eyes: Conjunctivae are normal. Right eye exhibits no discharge. Left eye exhibits no discharge.   Neck: Neck supple. No tracheal deviation present.   Cardiovascular: Normal rate and regular rhythm.   Pulmonary/Chest: Effort normal and breath sounds normal.   Abdominal: Soft. Bowel sounds are normal.   Musculoskeletal: He exhibits no edema or deformity.   Neurological: He is alert. Coordination abnormal.   Left sided weakness   Skin: Skin is warm and dry. He is not diaphoretic.   Psychiatric: He is slowed. He is inattentive.       Significant Labs: All pertinent labs within the past 24 hours have been reviewed.    Significant Imaging: I have reviewed and interpreted all pertinent imaging results/findings within the past 24 hours.      Assessment/Plan:      * Generalized weakness  Patient presents with generalized weakness and apparent aphasia.  Evaluated for CVA in ER, but then noted to have significant leukocytosis and lactic acidosis.  Workup with MRI negative for acute CVA and MRA with noted 50% stenosis of left ICA distal left M1 segment, but no occlusion, aneurysm or vascular malformation  Neurology consulted and final input pending  Unsure etiology of leukocytosis and lactic acidosis.    Slightly hypotensive on presentation and admitted to ICU.  No obvious source of infection  Possible hypovolemia a factor given improvement with IV fluids versus medication  induced  Empirically started on broad spectrum antibiotics of cefepime and vancomycin  Patient does have hx of seizures, possible seizure causing current symptoms and contributing to leukocytosis.  Clinically improved but may need EEG  Blood and urine cultures no growth to date  Will continue IV fluids and continue to hold all anti hypertensive medications  Will deescalate antibiotics with stopping vancomycin today, but will continue cefepime for now  Consult placed to ID given increasing WBC count  Will follow up Neurology recommendations  Stable for transfer to the floor today  Consider stopping all antibiotics tomorrow if he remains afebrile and follow up on ID recommendations  Consult PT and OT    Epilepsy  As discussed above.  Continue levetiracetum    Change in behavior  As discussed above    Hyperlipidemia  Continue atorvastatin    BPH (benign prostatic hyperplasia)  Resume home medications.    ARF (acute renal failure)  Creatinine higher than baseline which is somewhere between 1.1 to 1.4  Unsure etiology, but likely pre renal  Renal function improving with IV hydration alone  Avoid nephrotoxic medications.  Continue IV fluids  Repeat laboratory testing in a.m.    Lactic acidosis  Unclear etiology  Possibly hypovolemia with resultant hypoperfusion versus infectious  Clinically improving    Leukocytosis  As discussed above  Will continue monitor    Essential hypertension  Permissive HTN while being ruled out for CVA.  However, blood pressure remains in the normal range and not hypertensive  Continue to hold all medications for now  Patient may need medication adjustment    History of CVA (cerebrovascular accident)  Chronic residual left sided weakness  Continue aspirin, Plavix and atorvastatin  As discussed above    Type 2 diabetes mellitus, without long-term current use of insulin  Hemoglobin A1c 7.6  Blood glucose fluctuated between 70 - 178  Continue sliding scale for now and diet advanced to diabetic  after swallowing test passed    VTE Risk Mitigation (From admission, onward)         Ordered     enoxaparin injection 30 mg  Daily      12/02/19 1838     IP VTE LOW RISK PATIENT  Once      12/02/19 1837     Place NNAMDI hose  Until discontinued      12/02/19 1837                Critical care time spent on the evaluation and treatment of severe organ dysfunction, review of pertinent labs and imaging studies, discussions with consulting providers and discussions with patient/family:  60 minutes.        Cathy Parmar MD  Department of Hospital Medicine   [facility]

## 2019-12-04 NOTE — SUBJECTIVE & OBJECTIVE
Interval History: No new issues. Improved clinically per wife     Review of Systems   Constitutional: Positive for activity change.   HENT: Negative for congestion.    Respiratory: Negative for chest tightness.    Cardiovascular: Negative for chest pain.   Gastrointestinal: Negative for abdominal pain.   Genitourinary: Negative for difficulty urinating.   Musculoskeletal: Negative for arthralgias.   Psychiatric/Behavioral: Negative for agitation.     Objective:     Vital Signs (Most Recent):  Temp: 97.9 °F (36.6 °C) (12/04/19 0744)  Pulse: 73 (12/04/19 0744)  Resp: 18 (12/04/19 0744)  BP: (!) 169/104 (12/04/19 0744)  SpO2: 99 % (12/04/19 0744) Vital Signs (24h Range):  Temp:  [97.8 °F (36.6 °C)-99.1 °F (37.3 °C)] 97.9 °F (36.6 °C)  Pulse:  [71-96] 73  Resp:  [11-24] 18  SpO2:  [93 %-100 %] 99 %  BP: (123-169)/() 169/104     Weight: 86.9 kg (191 lb 9.3 oz)  Body mass index is 31.88 kg/m².    Intake/Output Summary (Last 24 hours) at 12/4/2019 1009  Last data filed at 12/4/2019 0702  Gross per 24 hour   Intake 2953 ml   Output --   Net 2953 ml      Physical Exam   Constitutional: He is oriented to person, place, and time. He appears well-developed and well-nourished.   HENT:   Head: Normocephalic and atraumatic.   Cardiovascular: Normal rate and regular rhythm.   Pulmonary/Chest: Effort normal. No respiratory distress. He has no wheezes. He has no rales.   Neurological: He is alert and oriented to person, place, and time.   Skin: Skin is warm and dry.   Psychiatric: He has a normal mood and affect. His behavior is normal.   Nursing note and vitals reviewed.      Significant Labs:   BMP:   Recent Labs   Lab 12/04/19  0421   *      K 4.2      CO2 26   BUN 17   CREATININE 1.2   CALCIUM 8.9     CBC:   Recent Labs   Lab 12/02/19  1245 12/03/19  0327 12/04/19  0421   WBC 25.79* 28.78* 19.96*   HGB 16.0 13.9* 13.1*   HCT 50.1 44.0 40.4   * 321 290       Significant Imaging:

## 2019-12-04 NOTE — SUBJECTIVE & OBJECTIVE
Interval History:  Family at bedside reports he is back to his baseline, he is sitting up in bed eating without difficulty.  No acute events overnight.  Blood pressure in normal range.    Review of Systems   Constitutional: Negative for chills and fever.   Respiratory: Negative for shortness of breath.    Cardiovascular: Negative for chest pain.     Objective:     Vital Signs (Most Recent):  Temp: 98.2 °F (36.8 °C) (12/03/19 2056)  Pulse: 81 (12/03/19 2056)  Resp: 14 (12/03/19 2056)  BP: (!) 129/90 (12/03/19 2056)  SpO2: 98 % (12/03/19 2056) Vital Signs (24h Range):  Temp:  [97.8 °F (36.6 °C)-99.1 °F (37.3 °C)] 98.2 °F (36.8 °C)  Pulse:  [70-96] 81  Resp:  [11-25] 14  SpO2:  [94 %-100 %] 98 %  BP: (101-167)/(60-90) 129/90     Weight: 86.9 kg (191 lb 9.3 oz)  Body mass index is 31.88 kg/m².    Intake/Output Summary (Last 24 hours) at 12/3/2019 2316  Last data filed at 12/3/2019 2000  Gross per 24 hour   Intake 3450 ml   Output 500 ml   Net 2950 ml      Physical Exam   Constitutional: No distress.   HENT:   Head: Normocephalic and atraumatic.   Eyes: Conjunctivae are normal. Right eye exhibits no discharge. Left eye exhibits no discharge.   Neck: Neck supple. No tracheal deviation present.   Cardiovascular: Normal rate and regular rhythm.   Pulmonary/Chest: Effort normal and breath sounds normal.   Abdominal: Soft. Bowel sounds are normal.   Musculoskeletal: He exhibits no edema or deformity.   Neurological: He is alert. Coordination abnormal.   Left sided weakness   Skin: Skin is warm and dry. He is not diaphoretic.   Psychiatric: He is slowed. He is inattentive.       Significant Labs: All pertinent labs within the past 24 hours have been reviewed.    Significant Imaging: I have reviewed and interpreted all pertinent imaging results/findings within the past 24 hours.

## 2019-12-04 NOTE — NURSING
Patient arrived to floor from ICU abed and accompanied by transport. Oriented to room, call light in reach. Patient assessed and this nurse in agreement with previous nurse Megan RN ICU's assessment. Bed alarm on and audible.

## 2019-12-04 NOTE — ASSESSMENT & PLAN NOTE
Hemoglobin A1c 7.6  Blood glucose fluctuated between 70 - 178  Continue sliding scale for now and diet advanced to diabetic after swallowing test passed

## 2019-12-04 NOTE — ASSESSMENT & PLAN NOTE
Patient presents with generalized weakness and apparent aphasia.  Evaluated for CVA in ER, but then noted to have significant leukocytosis and lactic acidosis.  Workup with MRI negative for acute CVA and MRA with noted 50% stenosis of left ICA distal left M1 segment, but no occlusion, aneurysm or vascular malformation  Neurology consulted and final input pending  Unsure etiology of leukocytosis and lactic acidosis.    Slightly hypotensive on presentation and admitted to ICU.  No obvious source of infection  Possible hypovolemia a factor given improvement with IV fluids versus medication induced  Empirically started on broad spectrum antibiotics of cefepime and vancomycin  Patient does have hx of seizures, possible seizure causing current symptoms and contributing to leukocytosis.  Clinically improved but may need EEG  Blood and urine cultures no growth to date  Will continue IV fluids and continue to hold all anti hypertensive medications  Will deescalate antibiotics with stopping vancomycin today, but will continue cefepime for now  Consult placed to ID given increasing WBC count  Will follow up Neurology recommendations  Stable for transfer to the floor today  Consider stopping all antibiotics tomorrow if he remains afebrile and follow up on ID recommendations  Consult PT and OT

## 2019-12-04 NOTE — PROGRESS NOTES
[facility] Hospital Medicine  Progress Note    Patient Name: Richard M Pipkins  MRN: [id number]  Patient Class: IP- Inpatient   Admission Date: 12/2/2019  Length of Stay: 2 days  Attending Physician: Elvis Weeks MD  Primary Care Provider: Annalee Campos MD        Subjective:     Principal Problem:Generalized weakness        HPI:  65 y/o male with hx of CVA with left sided weakness presents to the ER via EMS for an evaluation of generalized weakness since this morning.  Patient could not walk on his own this morning and was not talking.  Patient's most recent known normal was 7pm last night when he went to bed.  He has chronic bilateral L>R weakness and chronic incontinence but has not had incontinence today. Patient's wife was concerned because he has not been mobile or talking today.  This is different from his baseline.  Patient was evaluated for possible stroke in ER.  Labs then showed significant leukocytosis and lactic acidosis.  Patient's wife denies any fevers, chills, cough, shortness of breath or any other complaints.    Overview/Hospital Course:  Mr. Pipkins presented with generalized weakness and changes in activity level.  Initial head CT unremarkable.  He was also noted to have leukocytosis, hypotension and lactic acidosis with no obvious source of infection.  Blood and urine cultures obtained.  Empiric antibiotics of cefepime and vancomycin initiated.  Neurology consulted and started on anti-seizure meds for possible seizures. The patient had a sig. WBC elevation with negative urine and blood cultures.  Prophylactic Abx's were started in the ICU. Patient's hypotension resolved and the patient was transferred out of the ICU on 12/3.  ID was consulted on 12/4. PT/OT were consulted.     Interval History: No new issues. Improved clinically per wife     Review of Systems   Constitutional: Positive for activity change.   HENT: Negative for congestion.    Respiratory:  Negative for chest tightness.    Cardiovascular: Negative for chest pain.   Gastrointestinal: Negative for abdominal pain.   Genitourinary: Negative for difficulty urinating.   Musculoskeletal: Negative for arthralgias.   Psychiatric/Behavioral: Negative for agitation.     Objective:     Vital Signs (Most Recent):  Temp: 97.9 °F (36.6 °C) (12/04/19 0744)  Pulse: 73 (12/04/19 0744)  Resp: 18 (12/04/19 0744)  BP: (!) 169/104 (12/04/19 0744)  SpO2: 99 % (12/04/19 0744) Vital Signs (24h Range):  Temp:  [97.8 °F (36.6 °C)-99.1 °F (37.3 °C)] 97.9 °F (36.6 °C)  Pulse:  [71-96] 73  Resp:  [11-24] 18  SpO2:  [93 %-100 %] 99 %  BP: (123-169)/() 169/104     Weight: 86.9 kg (191 lb 9.3 oz)  Body mass index is 31.88 kg/m².    Intake/Output Summary (Last 24 hours) at 12/4/2019 1009  Last data filed at 12/4/2019 0702  Gross per 24 hour   Intake 2953 ml   Output --   Net 2953 ml      Physical Exam   Constitutional: He is oriented to person, place, and time. He appears well-developed and well-nourished.   HENT:   Head: Normocephalic and atraumatic.   Cardiovascular: Normal rate and regular rhythm.   Pulmonary/Chest: Effort normal. No respiratory distress. He has no wheezes. He has no rales.   Neurological: He is alert and oriented to person, place, and time.   Skin: Skin is warm and dry.   Psychiatric: He has a normal mood and affect. His behavior is normal.   Nursing note and vitals reviewed.      Significant Labs:   BMP:   Recent Labs   Lab 12/04/19  0421   *      K 4.2      CO2 26   BUN 17   CREATININE 1.2   CALCIUM 8.9     CBC:   Recent Labs   Lab 12/02/19  1245 12/03/19  0327 12/04/19  0421   WBC 25.79* 28.78* 19.96*   HGB 16.0 13.9* 13.1*   HCT 50.1 44.0 40.4   * 321 290       Significant Imaging:      Assessment/Plan:      * Generalized weakness  Patient presents with generalized weakness and apparent aphasia.  Evaluated for CVA in ER, but then noted to have significant leukocytosis and lactic  acidosis.  Workup with MRI negative for acute CVA and MRA with noted 50% stenosis of left ICA distal left M1 segment, but no occlusion, aneurysm or vascular malformation  Neurology consulted and final input pending  Unsure etiology of leukocytosis and lactic acidosis.    Slightly hypotensive on presentation and admitted to ICU.  No obvious source of infection  Possible hypovolemia a factor given improvement with IV fluids versus medication induced  Empirically started on broad spectrum antibiotics of cefepime and vancomycin  Patient does have hx of seizures, possible seizure causing current symptoms and contributing to leukocytosis.  Clinically improved but may need EEG  Blood and urine cultures no growth to date  Will continue IV fluids and continue to hold all anti hypertensive medications  Will deescalate antibiotics with stopping vancomycin today, but will continue cefepime for now  Consult placed to ID given increasing WBC count  Will follow up Neurology recommendations  Stable for transfer to the floor today  Consider stopping all antibiotics tomorrow if he remains afebrile and follow up on ID recommendations  Consult PT and OT    Will likely need SNF and wife favoring this. PPD and social work consult.      Debility  Consult PT/OT. Will need SNF. PPD and social work consult       Change in behavior  As discussed above    Epilepsy  As discussed above.  Continue levetiracetum    Hyperlipidemia  Continue atorvastatin    BPH (benign prostatic hyperplasia)  Resume home medications.    ARF (acute renal failure)  Creatinine higher than baseline which is somewhere between 1.1 to 1.4  Unsure etiology, but likely pre renal  Renal function improving with IV hydration alone  Avoid nephrotoxic medications.  Continue IV fluids   Repeat laboratory testing in a.m.    Resolved     Lactic acidosis  Unclear etiology  Possibly hypovolemia with resultant hypoperfusion versus infectious  Clinically improving    Leukocytosis  As  discussed above  Will continue monitor    Will consult ID today    Essential hypertension  Permissive HTN while being ruled out for CVA.  However, blood pressure remains in the normal range and not hypertensive  Continue to hold all medications for now  Patient may need medication adjustment    History of CVA (cerebrovascular accident)  Chronic residual left sided weakness  Continue aspirin, Plavix and atorvastatin  As discussed above    Type 2 diabetes mellitus, without long-term current use of insulin  Hemoglobin A1c 7.6  Blood glucose fluctuated between 70 - 178  Continue sliding scale for now and diet advanced to diabetic after swallowing test passed    Obesity Body mass index is 31.88 kg/m².  Weight loss as outpatient       VTE Risk Mitigation (From admission, onward)         Ordered     enoxaparin injection 30 mg  Daily      12/02/19 1838     IP VTE LOW RISK PATIENT  Once      12/02/19 1837     Place NNAMDI hose  Until discontinued      12/02/19 1837                      Elvis Rondon MD  Department of Hospital Medicine   [facility]

## 2019-12-04 NOTE — ASSESSMENT & PLAN NOTE
Chronic residual left sided weakness  Continue aspirin, Plavix and atorvastatin  As discussed above

## 2019-12-04 NOTE — CONSULTS
[facility]  Neurology  Consult Note    Patient Name: Richard M Pipkins  MRN: [id number]  Admission Date: 12/2/2019  Hospital Length of Stay: 2 days  Code Status: Full Code   Attending Provider: Cathy Parmar MD   Consulting Provider: Trae Chapman MD  Primary Care Physician: Annalee Campos MD  Principal Problem:Generalized weakness    Inpatient consult to Neurology  Consult performed by: Trae Chapman MD  Consult ordered by: Cathy Parmar MD        Subjective:     Chief Complaint: Loss of consciousness    HPI: 63 y/o male with medical Hx as listed below comes to ED was brought to ED after family found him on the morning of admission to hospital unable to move and nonverbal. Upon arrival to ED pt was found to have a fever, leukocytosis, lactic acidosis. This morning he seems to be back to his baseline. Pt has Hx of stroke and brain rumor resection; left hemiparesis. Mr. Pipkins had Hx fo seizures. According to wife during a seizure pt rolls his eyes back and becomes unresponsive for a while hen slowly recovers. He is on levetiracetam 500 mg BID.    Past Medical History:   Diagnosis Date    Diabetes mellitus     Hypertension     Seizures     Stroke        Past Surgical History:   Procedure Laterality Date    BRAIN SURGERY      ESOPHAGOGASTRODUODENOSCOPY N/A 3/11/2019    Procedure: EGD (ESOPHAGOGASTRODUODENOSCOPY);  Surgeon: Snehal Chavira MD;  Location: [facility];  Service: Endoscopy;  Laterality: N/A;    HERNIA REPAIR         Review of patient's allergies indicates:   Allergen Reactions    Penicillins        Current Neurological Medications:     No current facility-administered medications on file prior to encounter.      Current Outpatient Medications on File Prior to Encounter   Medication Sig    amLODIPine (NORVASC) 5 MG tablet Take 5 mg by mouth once daily.     aspirin (ECOTRIN) 81 MG EC tablet Take 81 mg by mouth once daily.    atorvastatin (LIPITOR) 80 MG tablet Take 80 mg by  mouth every evening.     clopidogrel (PLAVIX) 75 mg tablet Take 75 mg by mouth once.     ergocalciferol (VITAMIN D2) 50,000 unit Cap Take 50,000 Units by mouth every 7 days.     fish oil-omega-3 fatty acids 300-1,000 mg capsule Take by mouth once daily.    levETIRAcetam (KEPPRA) 500 MG Tab Take 500 mg by mouth 2 (two) times daily.     LOVAZA 1 gram capsule Take 1 g by mouth 2 (two) times daily.     metoprolol succinate (TOPROL-XL) 25 MG 24 hr tablet 12.5 mg.     MULTIVIT-IRON-MIN-FOLIC ACID 3,500-18-0.4 UNIT-MG-MG ORAL CHEW Take by mouth.    multivitamin capsule Take 1 capsule by mouth once daily.    pantoprazole (PROTONIX) 20 MG tablet Take 1 tablet (20 mg total) by mouth once daily.    VICTOZA 3-ANATOLIY 0.6 mg/0.1 mL (18 mg/3 mL) PnIj Inject 1.2 mg into the skin every morning.     ACCU-CHEK SHAN PLUS TEST STRP Strp     GI cocktail (mylanta 30 mL, lidocaine 2 % viscous 10 mL, dicyclomine 10 mL) 50 mL Take 50 mLs by mouth every 8 (eight) hours as needed.    mupirocin (BACTROBAN) 2 % ointment Apply topically once daily.    oxybutynin (DITROPAN) 5 MG Tab Take 5 mg by mouth 3 (three) times daily.     phenol/glycerin (CHLORASEPTIC MAX SORE THROAT MM) by Mucous Membrane route.    pravastatin (PRAVACHOL) 40 MG tablet Take 40 mg by mouth once daily.     QUEtiapine (SEROQUEL) 25 MG Tab     RANEXA 500 mg Tb12 Take 500 mg by mouth 2 (two) times daily.     sertraline (ZOLOFT) 100 MG tablet Take 100 mg by mouth once daily.     tamsulosin (FLOMAX) 0.4 mg Cp24 Take 0.4 mg by mouth once daily.       Family History     None        Tobacco Use    Smoking status: Never Smoker   Substance and Sexual Activity    Alcohol use: No     Alcohol/week: 0.0 standard drinks    Drug use: No    Sexual activity: Not on file     Review of Systems   Constitutional: Negative for fever.   HENT: Negative for trouble swallowing.    Eyes: Negative for photophobia.   Respiratory: Negative for shortness of breath.    Cardiovascular:  Negative for chest pain.   Gastrointestinal: Negative for abdominal pain.   Genitourinary: Negative for dysuria.   Musculoskeletal: Negative for back pain.   Neurological: Negative for headaches.     Objective:     Vital Signs (Most Recent):  Temp: 97.9 °F (36.6 °C) (12/03/19 2330)  Pulse: 74 (12/03/19 2330)  Resp: 16 (12/03/19 2330)  BP: 136/65 (12/03/19 2330)  SpO2: (!) 94 % (12/03/19 2330) Vital Signs (24h Range):  Temp:  [97.8 °F (36.6 °C)-99.1 °F (37.3 °C)] 97.9 °F (36.6 °C)  Pulse:  [70-96] 74  Resp:  [11-25] 16  SpO2:  [94 %-100 %] 94 %  BP: (116-167)/(65-90) 136/65     Weight: 86.9 kg (191 lb 9.3 oz)  Body mass index is 31.88 kg/m².    Physical Exam   Constitutional: No distress.   HENT:   Head: Normocephalic.   Eyes: Right eye exhibits no discharge. Left eye exhibits no discharge.   Neck: Normal range of motion.   Cardiovascular: Regular rhythm.   Pulmonary/Chest: Effort normal.   Abdominal: Bowel sounds are normal.   Musculoskeletal: He exhibits no edema.   Skin: He is not diaphoretic.   Psychiatric: His speech is slurred.       NEUROLOGICAL EXAMINATION:     MENTAL STATUS   Speech: slurred   Level of consciousness: alert    CRANIAL NERVES     CN III, IV, VI   Right pupil: Size: 3 mm. Shape: regular.   Left pupil: Size: 3 mm. Shape: regular.   Nystagmus: none   Ophthalmoparesis: none  Conjugate gaze: present    CN VII   Right facial weakness: none  Left facial weakness: central    CN XII   Tongue deviation: none    MOTOR EXAM        Left spastic hemiparesis  RIght : 5/5     SENSORY EXAM   Right arm light touch: normal  Left arm light touch: normal  Right leg light touch: normal  Left leg light touch: normal      Significant Labs:   CBC:   Recent Labs   Lab 12/02/19  1245 12/03/19  0327 12/04/19  0421   WBC 25.79* 28.78* 19.96*   HGB 16.0 13.9* 13.1*   HCT 50.1 44.0 40.4   * 321 290     CMP:   Recent Labs   Lab 12/02/19  1245 12/03/19  0327 12/04/19  0421   * 84 134*    143 143   K 3.4*  3.8 4.2    109 108   CO2 26 25 26   BUN 22 24* 17   CREATININE 2.5* 1.7* 1.2   CALCIUM 9.8 8.6*  --    PROT 7.9  --   --    ALBUMIN 3.9  --   --    BILITOT 0.7  --   --    ALKPHOS 79  --   --    AST 24  --   --    ALT 36  --   --    ANIONGAP 14 9 9   EGFRNONAA 26* 41* >60       Significant Imaging:  MRI brain  Impression       No acute intracranial abnormality.    Grossly stable chronic findings as above.      Electronically signed by: Mathew Huang MD  Date: 12/02/2019  Time: 19:02            Assessment and Plan:     65 y/o male consulted for weakness    1. Weakness: is possible that pt had a seizure. Fever, leukocytosis, and even lactic acidosis can be seen after.    Work up in process to see if any source of infection. Cultures negative to date. MRI brain shows no acute abnormalities.   -Will increase levetiracetam from 500 mg BID to 750 mg BID.    Active Diagnoses:    Diagnosis Date Noted POA    PRINCIPAL PROBLEM:  Generalized weakness [R53.1] 12/02/2019 Yes    Change in behavior [R46.89] 12/03/2019 Yes    Leukocytosis [D72.829] 12/02/2019 Yes    Lactic acidosis [E87.2] 12/02/2019 Yes    ARF (acute renal failure) [N17.9] 12/02/2019 Yes    BPH (benign prostatic hyperplasia) [N40.0] 12/02/2019 Yes     Chronic    Hyperlipidemia [E78.5] 12/02/2019 Yes     Chronic    Epilepsy [G40.909] 12/02/2019 Yes     Chronic    Essential hypertension [I10] 03/11/2019 Yes    Type 2 diabetes mellitus, without long-term current use of insulin [E11.9] 03/09/2019 Yes    History of CVA (cerebrovascular accident) [Z86.73] 03/09/2019 Not Applicable      Problems Resolved During this Admission:    Diagnosis Date Noted Date Resolved POA    Change in behavior [R46.89] 12/02/2019 12/02/2019 Yes    Altered mental state [R41.82] 12/02/2019 12/02/2019 Yes       VTE Risk Mitigation (From admission, onward)         Ordered     enoxaparin injection 30 mg  Daily      12/02/19 1838     IP VTE LOW RISK PATIENT  Once      12/02/19  1837     Place NNAMDI hose  Until discontinued      12/02/19 1837                Thank you for your consult. I will follow-up with patient. Please contact us if you have any additional questions.    Trae Chapman MD  Neurology  [facility]

## 2019-12-04 NOTE — PLAN OF CARE
Problem: Fall Injury Risk  Goal: Absence of Fall and Fall-Related Injury  Outcome: Ongoing, Progressing  Intervention: Identify and Manage Contributors to Fall Injury Risk  Flowsheets (Taken 12/4/2019 0200)  Self-Care Promotion: independence encouraged; BADL personal objects within reach; BADL personal routines maintained  Intervention: Promote Injury-Free Environment  Flowsheets (Taken 12/4/2019 0200)  Environmental Safety Modification: assistive device/personal items within reach; clutter free environment maintained; lighting adjusted; room near unit station; room organization consistent     Problem: Adult Inpatient Plan of Care  Goal: Plan of Care Review  Outcome: Ongoing, Progressing  Flowsheets (Taken 12/4/2019 0200)  Plan of Care Reviewed With: patient     Problem: Diabetes Comorbidity  Goal: Blood Glucose Level Within Desired Range  Outcome: Ongoing, Progressing  Intervention: Maintain Glycemic Control  Flowsheets (Taken 12/4/2019 0200)  Glycemic Management: blood glucose monitoring

## 2019-12-04 NOTE — SUBJECTIVE & OBJECTIVE
Past Medical History:   Diagnosis Date    Diabetes mellitus     Hypertension     Seizures     Stroke        Past Surgical History:   Procedure Laterality Date    BRAIN SURGERY      ESOPHAGOGASTRODUODENOSCOPY N/A 3/11/2019    Procedure: EGD (ESOPHAGOGASTRODUODENOSCOPY);  Surgeon: Snehal Chavira MD;  Location: [facility];  Service: Endoscopy;  Laterality: N/A;    HERNIA REPAIR         Review of patient's allergies indicates:   Allergen Reactions    Penicillins        Medications:  Medications Prior to Admission   Medication Sig    amLODIPine (NORVASC) 5 MG tablet Take 5 mg by mouth once daily.     aspirin (ECOTRIN) 81 MG EC tablet Take 81 mg by mouth once daily.    atorvastatin (LIPITOR) 80 MG tablet Take 80 mg by mouth every evening.     clopidogrel (PLAVIX) 75 mg tablet Take 75 mg by mouth once.     ergocalciferol (VITAMIN D2) 50,000 unit Cap Take 50,000 Units by mouth every 7 days.     fish oil-omega-3 fatty acids 300-1,000 mg capsule Take by mouth once daily.    levETIRAcetam (KEPPRA) 500 MG Tab Take 500 mg by mouth 2 (two) times daily.     LOVAZA 1 gram capsule Take 1 g by mouth 2 (two) times daily.     metoprolol succinate (TOPROL-XL) 25 MG 24 hr tablet 12.5 mg.     MULTIVIT-IRON-MIN-FOLIC ACID 3,500-18-0.4 UNIT-MG-MG ORAL CHEW Take by mouth.    multivitamin capsule Take 1 capsule by mouth once daily.    pantoprazole (PROTONIX) 20 MG tablet Take 1 tablet (20 mg total) by mouth once daily.    VICTOZA 3-ANATOLIY 0.6 mg/0.1 mL (18 mg/3 mL) PnIj Inject 1.2 mg into the skin every morning.     ACCU-CHEK SHAN PLUS TEST STRP Strp     GI cocktail (mylanta 30 mL, lidocaine 2 % viscous 10 mL, dicyclomine 10 mL) 50 mL Take 50 mLs by mouth every 8 (eight) hours as needed.    mupirocin (BACTROBAN) 2 % ointment Apply topically once daily.    oxybutynin (DITROPAN) 5 MG Tab Take 5 mg by mouth 3 (three) times daily.     phenol/glycerin (CHLORASEPTIC MAX SORE THROAT MM) by Mucous Membrane route.    pravastatin  (PRAVACHOL) 40 MG tablet Take 40 mg by mouth once daily.     QUEtiapine (SEROQUEL) 25 MG Tab     RANEXA 500 mg Tb12 Take 500 mg by mouth 2 (two) times daily.     sertraline (ZOLOFT) 100 MG tablet Take 100 mg by mouth once daily.     tamsulosin (FLOMAX) 0.4 mg Cp24 Take 0.4 mg by mouth once daily.      Antibiotics (From admission, onward)    Start     Stop Route Frequency Ordered    12/03/19 0245  cefepime in dextrose 5 % 1 gram/50 mL IVPB 1 g      -- IV Every 12 hours (non-standard times) 12/02/19 1553        Antifungals (From admission, onward)    None        Antivirals (From admission, onward)    None           There is no immunization history for the selected administration types on file for this patient.    Family History     None        Social History     Socioeconomic History    Marital status:      Spouse name: Not on file    Number of children: Not on file    Years of education: Not on file    Highest education level: Not on file   Occupational History    Not on file   Social Needs    Financial resource strain: Not on file    Food insecurity:     Worry: Not on file     Inability: Not on file    Transportation needs:     Medical: Not on file     Non-medical: Not on file   Tobacco Use    Smoking status: Never Smoker   Substance and Sexual Activity    Alcohol use: No     Alcohol/week: 0.0 standard drinks    Drug use: No    Sexual activity: Not on file   Lifestyle    Physical activity:     Days per week: Not on file     Minutes per session: Not on file    Stress: Not on file   Relationships    Social connections:     Talks on phone: Not on file     Gets together: Not on file     Attends Restorationism service: Not on file     Active member of club or organization: Not on file     Attends meetings of clubs or organizations: Not on file     Relationship status: Not on file   Other Topics Concern    Not on file   Social History Narrative    Not on file     Review of Systems   Constitutional:  Positive for activity change. Negative for chills and fever.   HENT: Negative for congestion.    Respiratory: Negative for cough and shortness of breath.    Cardiovascular: Negative for chest pain and leg swelling.   Gastrointestinal: Negative for abdominal pain, constipation, diarrhea, nausea and vomiting.   Genitourinary: Positive for dysuria. Negative for flank pain.   Musculoskeletal: Negative for arthralgias and back pain.   Skin: Negative for rash and wound.   Neurological: Positive for speech difficulty and weakness. Negative for dizziness, facial asymmetry and numbness.     Objective:     Vital Signs (Most Recent):  Temp: 98.3 °F (36.8 °C) (12/04/19 1118)  Pulse: 85 (12/04/19 1118)  Resp: 18 (12/04/19 1118)  BP: (!) 145/76 (12/04/19 1118)  SpO2: (!) 94 % (12/04/19 1150) Vital Signs (24h Range):  Temp:  [97.8 °F (36.6 °C)-99.1 °F (37.3 °C)] 98.3 °F (36.8 °C)  Pulse:  [73-96] 85  Resp:  [13-24] 18  SpO2:  [93 %-100 %] 94 %  BP: (123-169)/() 145/76     Weight: 86.9 kg (191 lb 9.3 oz)  Body mass index is 31.88 kg/m².    Estimated Creatinine Clearance: 62.2 mL/min (based on SCr of 1.2 mg/dL).    Physical Exam   Constitutional: He appears well-developed. No distress.   HENT:   Head: Normocephalic and atraumatic.   Eyes: Conjunctivae are normal.   Neck: Neck supple.   Cardiovascular: Normal rate and regular rhythm.   Pulmonary/Chest: Effort normal and breath sounds normal.   Abdominal: Soft. Bowel sounds are normal.   Musculoskeletal: He exhibits no edema or deformity.   Neurological: He is alert. Coordination abnormal.   Left sided weakness   Skin: Skin is warm and dry. He is not diaphoretic.   Psychiatric: He is slowed. He is attentive.       Significant Labs:   Blood Culture:   Recent Labs   Lab 12/02/19  1400 12/02/19  1405   LABBLOO No Growth to date  No Growth to date No Growth to date  No Growth to date     CBC:   Recent Labs   Lab 12/02/19  1245 12/03/19  0327 12/04/19  0421   WBC 25.79* 28.78*  19.96*   HGB 16.0 13.9* 13.1*   HCT 50.1 44.0 40.4   * 321 290     CMP:   Recent Labs   Lab 12/02/19  1245 12/03/19  0327 12/04/19  0421    143 143   K 3.4* 3.8 4.2    109 108   CO2 26 25 26   * 84 134*   BUN 22 24* 17   CREATININE 2.5* 1.7* 1.2   CALCIUM 9.8 8.6* 8.9   PROT 7.9  --   --    ALBUMIN 3.9  --   --    BILITOT 0.7  --   --    ALKPHOS 79  --   --    AST 24  --   --    ALT 36  --   --    ANIONGAP 14 9 9   EGFRNONAA 26* 41* >60     Urine Culture:   Recent Labs   Lab 12/02/19  1439   LABURIN No growth     Urine Studies:   Recent Labs   Lab 12/02/19  1439   COLORU Aline   APPEARANCEUA Hazy*   PHUR 5.0   SPECGRAV 1.020   PROTEINUA 2+*   GLUCUA Negative   KETONESU Negative   BILIRUBINUA Negative   OCCULTUA Negative   NITRITE Negative   UROBILINOGEN 4.0-6.0*   LEUKOCYTESUR Negative   RBCUA 0   WBCUA 8*   BACTERIA None   HYALINECASTS 60*       Significant Imaging: I have reviewed all pertinent imaging results/findings within the past 24 hours.

## 2019-12-04 NOTE — PROGRESS NOTES
[facility]  Neurology  Progress Note    Patient Name: Richard M Pipkins  MRN: [id number]  Admission Date: 12/2/2019  Hospital Length of Stay: 2 days  Code Status: Full Code   Attending Provider: Elvis Weeks MD  Primary Care Physician: Annalee Campos MD   Principal Problem:Generalized weakness    Subjective:     Interval History: 65 y/o male with medical Hx as listed below comes to ED was brought to ED after family found him on the morning of admission to hospital unable to move and nonverbal. Upon arrival to ED pt was found to have a fever, leukocytosis, lactic acidosis. This morning he seems to be back to his baseline. Pt has Hx of stroke and brain rumor resection; left hemiparesis. Mr. Pipkins had Hx fo seizures. According to wife during a seizure pt rolls his eyes back and becomes unresponsive for a while hen slowly recovers. He is on levetiracetam 500 mg BID.     -12/4/19: No acute issues reported.    Current Neurological Medications:     Current Facility-Administered Medications   Medication Dose Route Frequency Provider Last Rate Last Dose    0.9%  NaCl infusion   Intravenous Continuous Cathy Parmar  mL/hr at 12/04/19 1109      acetaminophen tablet 650 mg  650 mg Oral Q4H PRN Cathy Parmar MD        aspirin chewable tablet 81 mg  81 mg Oral Daily Cathy Parmar MD   81 mg at 12/04/19 0817    atorvastatin tablet 80 mg  80 mg Oral QHS Cathy Parmar MD   80 mg at 12/03/19 2006    cefepime in dextrose 5 % 1 gram/50 mL IVPB 1 g  1 g Intravenous Q12H Cathy Parmar  mL/hr at 12/04/19 0307 1 g at 12/04/19 0307    clopidogrel tablet 75 mg  75 mg Oral Once Cathy Parmar MD        dextrose 50% injection 12.5 g  12.5 g Intravenous PRN Cathy Parmar MD        enoxaparin injection 30 mg  30 mg Subcutaneous Daily Cathy Parmar MD   30 mg at 12/03/19 1755    glucagon (human recombinant) injection 1 mg  1 mg Intramuscular PRN Cathy Parmar MD        influenza (HIGH-DOSE  PF) vaccine 0.5 mL  0.5 mL Intramuscular Prior to discharge Cathy Parmar MD        insulin aspart U-100 pen 0-5 Units  0-5 Units Subcutaneous Q6H PRN Cathy Parmar MD        levetiracetam oral soln Soln 750 mg  750 mg Oral BID Trae Chapman MD   750 mg at 12/04/19 0817    oxybutynin tablet 5 mg  5 mg Oral TID Cathy Parmar MD   5 mg at 12/04/19 0817    polyethylene glycol packet 17 g  17 g Oral BID PRN Cathy Parmar MD        promethazine (PHENERGAN) 6.25 mg in dextrose 5 % 50 mL IVPB  6.25 mg Intravenous Q6H PRN Cathy Parmar MD        sodium chloride 0.9% flush 10 mL  10 mL Intravenous PRN Cathy Parmar MD        tamsulosin 24 hr capsule 0.4 mg  0.4 mg Oral Daily Cathy Parmar MD   0.4 mg at 12/04/19 0817    tuberculin injection 5 Units  5 Units Intradermal Once Elvis Weeks MD           Review of Systems   Constitutional: Negative for fever.   HENT: Negative for trouble swallowing.    Eyes: Negative for photophobia.   Respiratory: Negative for shortness of breath.    Cardiovascular: Negative for chest pain.   Gastrointestinal: Negative for abdominal pain.   Genitourinary: Negative for dysuria.   Musculoskeletal: Negative for back pain.   Neurological: Negative for headaches.     Objective:     Vital Signs (Most Recent):  Temp: 98.3 °F (36.8 °C) (12/04/19 1118)  Pulse: 85 (12/04/19 1118)  Resp: 18 (12/04/19 1118)  BP: (!) 145/76 (12/04/19 1118)  SpO2: (!) 94 % (12/04/19 1150) Vital Signs (24h Range):  Temp:  [97.8 °F (36.6 °C)-99.1 °F (37.3 °C)] 98.3 °F (36.8 °C)  Pulse:  [73-96] 85  Resp:  [13-24] 18  SpO2:  [93 %-100 %] 94 %  BP: (123-169)/() 145/76     Weight: 86.9 kg (191 lb 9.3 oz)  Body mass index is 31.88 kg/m².    Physical Exam  Constitutional: No distress.   HENT:   Head: Normocephalic.   Eyes: Right eye exhibits no discharge. Left eye exhibits no discharge.   Neck: Normal range of motion.   Cardiovascular: Regular rhythm.   Pulmonary/Chest: Effort normal.   Abdominal: Bowel sounds  are normal.   Musculoskeletal: He exhibits no edema.   Skin: He is not diaphoretic.   Psychiatric: His speech is slurred.         NEUROLOGICAL EXAMINATION:      MENTAL STATUS   Speech: slurred   Level of consciousness: alert     CRANIAL NERVES      CN III, IV, VI   Right pupil: Size: 3 mm. Shape: regular.   Left pupil: Size: 3 mm. Shape: regular.   Nystagmus: none   Ophthalmoparesis: none  Conjugate gaze: present     CN VII   Right facial weakness: none  Left facial weakness: central     CN XII   Tongue deviation: none     MOTOR EXAM        Left spastic hemiparesis  RIght : 5/5      SENSORY EXAM   Right arm light touch: normal  Left arm light touch: normal  Right leg light touch: normal  Left leg light touch: normal             Significant Labs:   CBC:   Recent Labs   Lab 12/03/19 0327 12/04/19 0421   WBC 28.78* 19.96*   HGB 13.9* 13.1*   HCT 44.0 40.4    290     CMP:   Recent Labs   Lab 12/03/19 0327 12/04/19 0421   GLU 84 134*    143   K 3.8 4.2    108   CO2 25 26   BUN 24* 17   CREATININE 1.7* 1.2   CALCIUM 8.6* 8.9   ANIONGAP 9 9   EGFRNONAA 41* >60       Assessment and Plan:     63 y/o male consulted for weakness     1. Weakness: is possible that pt had a seizure. Fever, leukocytosis, and even lactic acidosis can be seen after.            Work up in process to see if any source of infection. Cultures negative to date. MRI brain shows no acute abnormalities.           -Levetiracetam from 500 mg BID to 750 mg BID. If pt somnolent with this dose will decrease.    Active Diagnoses:    Diagnosis Date Noted POA    PRINCIPAL PROBLEM:  Generalized weakness [R53.1] 12/02/2019 Yes    Debility [R53.81] 12/04/2019 Yes    Change in behavior [R46.89] 12/03/2019 Yes    Leukocytosis [D72.829] 12/02/2019 Yes    Lactic acidosis [E87.2] 12/02/2019 Yes    ARF (acute renal failure) [N17.9] 12/02/2019 Yes    BPH (benign prostatic hyperplasia) [N40.0] 12/02/2019 Yes     Chronic    Hyperlipidemia [E78.5]  12/02/2019 Yes     Chronic    Epilepsy [G40.909] 12/02/2019 Yes     Chronic    Essential hypertension [I10] 03/11/2019 Yes    Type 2 diabetes mellitus, without long-term current use of insulin [E11.9] 03/09/2019 Yes    History of CVA (cerebrovascular accident) [Z86.73] 03/09/2019 Not Applicable      Problems Resolved During this Admission:    Diagnosis Date Noted Date Resolved POA    Change in behavior [R46.89] 12/02/2019 12/02/2019 Yes    Altered mental state [R41.82] 12/02/2019 12/02/2019 Yes       VTE Risk Mitigation (From admission, onward)         Ordered     enoxaparin injection 30 mg  Daily      12/02/19 1838     IP VTE LOW RISK PATIENT  Once      12/02/19 1837     Place NNAMDI hose  Until discontinued      12/02/19 1837                Trae Chapman MD  Neurology  [facility]

## 2019-12-04 NOTE — PT/OT/SLP EVAL
Physical Therapy Evaluation    Patient Name:  Richard M Pipkins   MRN:  [id number]    Recommendations:     Discharge Recommendations:  nursing facility, skilled   Discharge Equipment Recommendations: (Ongoing assessment pending pt progress.  may need Franky-walkr vs Quad cane for ambulation)   Barriers to discharge: Decreased caregiver support    Assessment:     Richard M Pipkins is a 64 y.o. male admitted with a medical diagnosis of Generalized weakness.  He presents with the following impairments/functional limitations:  weakness, impaired functional mobilty, impaired cognition, decreased safety awareness, impaired coordination, gait instability, impaired endurance, decreased coordination, impaired fine motor, impaired balance, decreased upper extremity function, impaired self care skills, decreased ROM, abnormal tone, decreased lower extremity function .    Rehab Prognosis: Good; patient would benefit from acute skilled PT services to address these deficits and reach maximum level of function.    Recent Surgery: * No surgery found *      Plan:     During this hospitalization, patient to be seen 5 x/week to address the identified rehab impairments via gait training, therapeutic exercises, therapeutic activities and progress toward the following goals:    · Plan of Care Expires:  12/18/19    Subjective     Chief Complaint: no c/o, pt lethargic  Patient/Family Comments/goals: agreeable to sit up in chair to eat lunch  Pain/Comfort:  · Pain Rating 1: 0/10    Patients cultural, spiritual, Alevism conflicts given the current situation: no    Living Environment:  Pt lives with his wife who works in a [employer] with no concerns   Prior to admission, patients level of function was Mod I with rollator for ambulation  Had PCA services and adult day that got cancelled PTA.  Equipment used at home: wheelchair, bedside commode, rollator, shower chair, glucometer.  DME owned (not currently used): none.  Upon discharge, patient will  have assistance from Family, but limited 2/2 wife works.    Objective:     Communicated with nsg prior to session.  Patient found HOB elevated with oxygen, bed alarm, pressure relief boots  upon PT entry to room.    General Precautions: Standard, fall   Orthopedic Precautions:N/A   Braces: N/A     Exams:  · Cognitive Exam:  Patient is oriented to Person and Place  · Gross Motor Coordination:  impaired 2/2 gen weakness, deconditioning, and residual deficits from prior CVA  · Postural Exam:  Patient presented with the following abnormalities:    · -       Rounded shoulders  · -       Forward head  · -       Abnormal trunk flexion  · Sensation:    · -       Intact  light/touch B LE's  · Skin Integrity/Edema:      · -       Skin integrity: Visible skin intact  · RLE ROM: WFL  · RLE Strength: WFL  · LLE ROM: WFL  · LLE Strength: Dflx 0/5, Knee ext 3-/5    Functional Mobility:  · Bed Mobility:     · Rolling Left:  minimum assistance and with Vc/Tc for reaching for BR  · Scooting: contact guard assistance  · Supine to Sit: minimum assistance  · Sit to Supine: N/T  · Transfers:     · Sit to Stand:  minimum assistance and with Vc for hadn placement/safety with rolling walker  · Bed to Chair: minimum assistance and with VC for hand placment/safety with  no AD  using  Stand Pivot  · Gait: 5 sidesteps at EOB with RW and min A for walker management 2/2 L UE unable to grasp walker.    · Balance: Fair+ sit, Fair stand      Therapeutic Activities and Exercises:   bed mobility, and t/f training as above.  Pt able to feed himself with set-up assist     AM-PAC 6 CLICK MOBILITY  Total Score:16     Patient left up in chair with all lines intact, call button in reach and nsg notified.    GOALS:   Multidisciplinary Problems     Physical Therapy Goals        Problem: Physical Therapy Goal    Goal Priority Disciplines Outcome Goal Variances Interventions   Physical Therapy Goal     PT, PT/OT      Description:  Goals to be met by: 12/18/2019      Patient will increase functional independence with mobility by performin. Supine to sit with Modified [place]  2. Sit to stand transfer with Modified [place]  3. Gait  x  feet with Supervision using Franky-walker/Quad cane.   4. Lower extremity exercise program x10 reps per handout, with assistance as needed                      History:     Past Medical History:   Diagnosis Date    Diabetes mellitus     Hypertension     Seizures     Stroke        Past Surgical History:   Procedure Laterality Date    BRAIN SURGERY      ESOPHAGOGASTRODUODENOSCOPY N/A 3/11/2019    Procedure: EGD (ESOPHAGOGASTRODUODENOSCOPY);  Surgeon: Snehal Chavira MD;  Location: [facility];  Service: Endoscopy;  Laterality: N/A;    HERNIA REPAIR         Time Tracking:     PT Received On: 19  PT Start Time: 1215     PT Stop Time: 1245  PT Total Time (min): 30 min     Billable Minutes: Evaluation 15 and Therapeutic Activity 15      Rivka Childress, PT  2019

## 2019-12-04 NOTE — PLAN OF CARE
Problem: Occupational Therapy Goal  Goal: Occupational Therapy Goal  Description  Goals to be met by: 12/18/19     Patient will increase functional independence with ADLs by performing:    Feeding with Set-up Assistance.  UE Dressing with Set-up Assistance.  LE Dressing with Contact Guard Assistance.  Grooming while standing at sink with Stand-by Assistance.  Toileting from bedside commode with Stand-by Assistance for hygiene and clothing management.   Supine to sit with Contact Guard Assistance.  Step transfer with Stand-by Assistance  Toilet transfer to bedside commode with Stand-by Assistance.  Upper extremity exercise program x15 reps per handout, with supervision.     Outcome: Ongoing, Progressing    Pt will continue to benefit from acute OT in order to increase safety awareness and independence with ADLs and all aspects of functional mobility. OT rec. SNF at d/c to regain PLOF and reduce risk of falls/readmission/morbidity.

## 2019-12-04 NOTE — ASSESSMENT & PLAN NOTE
Unclear etiology  Possibly hypovolemia with resultant hypoperfusion versus infectious  Clinically improving

## 2019-12-04 NOTE — ASSESSMENT & PLAN NOTE
Creatinine higher than baseline which is somewhere between 1.1 to 1.4  Unsure etiology, but likely pre renal  Renal function improving with IV hydration alone  Avoid nephrotoxic medications.  Continue IV fluids  Repeat laboratory testing in a.m.

## 2019-12-04 NOTE — HOSPITAL COURSE
Mr. Pipkins presented with generalized weakness and changes in activity level.  Initial head CT unremarkable.  He was also noted to have leukocytosis, hypotension and lactic acidosis with no obvious source of infection.  Blood and urine cultures obtained.  Empiric antibiotics of cefepime and vancomycin initiated.  Neurology consulted and started on anti-seizure meds for possible seizures. The patient had a sig. WBC elevation with negative urine and blood cultures.  Prophylactic Abx's were started in the ICU. Patient's hypotension resolved and the patient was transferred out of the ICU on 12/3.  ID was consulted on 12/4. PT/OT were consulted and recommended SNF. ID noted that the leukocytosis may have been reactive to possible seizure and given all negative work up- stopped all Abx. Leukocytosis resolved. ID consulted and recommended to stop Abx and monitor. The patient will be going to [facility] SNF today. Activity as tolerated. Diet- soft/diabetic 2000 malissa.Follow up with PCP in one week

## 2019-12-04 NOTE — ASSESSMENT & PLAN NOTE
Patient presents with generalized weakness and apparent aphasia.  Evaluated for CVA in ER, but then noted to have significant leukocytosis and lactic acidosis.  Workup with MRI negative for acute CVA and MRA with noted 50% stenosis of left ICA distal left M1 segment, but no occlusion, aneurysm or vascular malformation  Neurology consulted and final input pending  Unsure etiology of leukocytosis and lactic acidosis.    Slightly hypotensive on presentation and admitted to ICU.  No obvious source of infection  Possible hypovolemia a factor given improvement with IV fluids versus medication induced  Empirically started on broad spectrum antibiotics of cefepime and vancomycin  Patient does have hx of seizures, possible seizure causing current symptoms and contributing to leukocytosis.  Clinically improved but may need EEG  Blood and urine cultures no growth to date  Will continue IV fluids and continue to hold all anti hypertensive medications  Will deescalate antibiotics with stopping vancomycin today, but will continue cefepime for now  Consult placed to ID given increasing WBC count  Will follow up Neurology recommendations  Stable for transfer to the floor today  Consider stopping all antibiotics tomorrow if he remains afebrile and follow up on ID recommendations  Consult PT and OT    Will likely need SNF and wife favoring this. PPD and social work consult.

## 2019-12-05 LAB
ANION GAP SERPL CALC-SCNC: 7 MMOL/L (ref 8–16)
BASOPHILS # BLD AUTO: 0.03 K/UL (ref 0–0.2)
BASOPHILS NFR BLD: 0.4 % (ref 0–1.9)
BUN SERPL-MCNC: 11 MG/DL (ref 8–23)
CALCIUM SERPL-MCNC: 8.6 MG/DL (ref 8.7–10.5)
CHLORIDE SERPL-SCNC: 109 MMOL/L (ref 95–110)
CO2 SERPL-SCNC: 27 MMOL/L (ref 23–29)
CREAT SERPL-MCNC: 1 MG/DL (ref 0.5–1.4)
DIFFERENTIAL METHOD: ABNORMAL
EOSINOPHIL # BLD AUTO: 0.2 K/UL (ref 0–0.5)
EOSINOPHIL NFR BLD: 2.2 % (ref 0–8)
ERYTHROCYTE [DISTWIDTH] IN BLOOD BY AUTOMATED COUNT: 14.9 % (ref 11.5–14.5)
EST. GFR  (AFRICAN AMERICAN): >60 ML/MIN/1.73 M^2
EST. GFR  (NON AFRICAN AMERICAN): >60 ML/MIN/1.73 M^2
GLUCOSE SERPL-MCNC: 112 MG/DL (ref 70–110)
HCT VFR BLD AUTO: 41.5 % (ref 40–54)
HGB BLD-MCNC: 13.5 G/DL (ref 14–18)
IMM GRANULOCYTES # BLD AUTO: 0.06 K/UL (ref 0–0.04)
IMM GRANULOCYTES NFR BLD AUTO: 0.7 % (ref 0–0.5)
LYMPHOCYTES # BLD AUTO: 2.7 K/UL (ref 1–4.8)
LYMPHOCYTES NFR BLD: 33.2 % (ref 18–48)
MCH RBC QN AUTO: 29.7 PG (ref 27–31)
MCHC RBC AUTO-ENTMCNC: 32.5 G/DL (ref 32–36)
MCV RBC AUTO: 91 FL (ref 82–98)
MONOCYTES # BLD AUTO: 0.5 K/UL (ref 0.3–1)
MONOCYTES NFR BLD: 5.6 % (ref 4–15)
NEUTROPHILS # BLD AUTO: 4.7 K/UL (ref 1.8–7.7)
NEUTROPHILS NFR BLD: 57.9 % (ref 38–73)
NRBC BLD-RTO: 0 /100 WBC
PLATELET # BLD AUTO: 281 K/UL (ref 150–350)
PMV BLD AUTO: 10.6 FL (ref 9.2–12.9)
POCT GLUCOSE: 123 MG/DL (ref 70–110)
POCT GLUCOSE: 127 MG/DL (ref 70–110)
POCT GLUCOSE: 135 MG/DL (ref 70–110)
POCT GLUCOSE: 142 MG/DL (ref 70–110)
POCT GLUCOSE: 162 MG/DL (ref 70–110)
POCT GLUCOSE: 178 MG/DL (ref 70–110)
POTASSIUM SERPL-SCNC: 3.8 MMOL/L (ref 3.5–5.1)
RBC # BLD AUTO: 4.54 M/UL (ref 4.6–6.2)
SODIUM SERPL-SCNC: 143 MMOL/L (ref 136–145)
WBC # BLD AUTO: 8.1 K/UL (ref 3.9–12.7)

## 2019-12-05 PROCEDURE — 97530 THERAPEUTIC ACTIVITIES: CPT

## 2019-12-05 PROCEDURE — 63600175 PHARM REV CODE 636 W HCPCS: Performed by: HOSPITALIST

## 2019-12-05 PROCEDURE — 97535 SELF CARE MNGMENT TRAINING: CPT

## 2019-12-05 PROCEDURE — 25000003 PHARM REV CODE 250: Performed by: INTERNAL MEDICINE

## 2019-12-05 PROCEDURE — 97116 GAIT TRAINING THERAPY: CPT

## 2019-12-05 PROCEDURE — 99232 SBSQ HOSP IP/OBS MODERATE 35: CPT | Mod: ,,, | Performed by: PSYCHIATRY & NEUROLOGY

## 2019-12-05 PROCEDURE — 11000001 HC ACUTE MED/SURG PRIVATE ROOM

## 2019-12-05 PROCEDURE — 85025 COMPLETE CBC W/AUTO DIFF WBC: CPT

## 2019-12-05 PROCEDURE — 27000221 HC OXYGEN, UP TO 24 HOURS

## 2019-12-05 PROCEDURE — 99233 SBSQ HOSP IP/OBS HIGH 50: CPT | Mod: ,,, | Performed by: PHYSICIAN ASSISTANT

## 2019-12-05 PROCEDURE — 25000003 PHARM REV CODE 250: Performed by: HOSPITALIST

## 2019-12-05 PROCEDURE — 94761 N-INVAS EAR/PLS OXIMETRY MLT: CPT

## 2019-12-05 PROCEDURE — 36415 COLL VENOUS BLD VENIPUNCTURE: CPT

## 2019-12-05 PROCEDURE — 25000003 PHARM REV CODE 250: Performed by: PSYCHIATRY & NEUROLOGY

## 2019-12-05 PROCEDURE — 80048 BASIC METABOLIC PNL TOTAL CA: CPT

## 2019-12-05 PROCEDURE — 99232 PR SUBSEQUENT HOSPITAL CARE,LEVL II: ICD-10-PCS | Mod: ,,, | Performed by: PSYCHIATRY & NEUROLOGY

## 2019-12-05 PROCEDURE — 99233 PR SUBSEQUENT HOSPITAL CARE,LEVL III: ICD-10-PCS | Mod: ,,, | Performed by: PHYSICIAN ASSISTANT

## 2019-12-05 RX ORDER — HYDRALAZINE HYDROCHLORIDE 25 MG/1
25 TABLET, FILM COATED ORAL EVERY 8 HOURS
Status: DISCONTINUED | OUTPATIENT
Start: 2019-12-05 | End: 2019-12-06

## 2019-12-05 RX ORDER — AMLODIPINE BESYLATE 5 MG/1
5 TABLET ORAL DAILY
Status: DISCONTINUED | OUTPATIENT
Start: 2019-12-05 | End: 2019-12-07

## 2019-12-05 RX ORDER — METOPROLOL TARTRATE 25 MG/1
12.5 TABLET ORAL DAILY
Status: DISCONTINUED | OUTPATIENT
Start: 2019-12-05 | End: 2019-12-09 | Stop reason: HOSPADM

## 2019-12-05 RX ADMIN — AMLODIPINE BESYLATE 5 MG: 5 TABLET ORAL at 08:12

## 2019-12-05 RX ADMIN — ASPIRIN 81 MG 81 MG: 81 TABLET ORAL at 08:12

## 2019-12-05 RX ADMIN — OXYBUTYNIN CHLORIDE 5 MG: 5 TABLET ORAL at 08:12

## 2019-12-05 RX ADMIN — HYDRALAZINE HYDROCHLORIDE 25 MG: 25 TABLET, FILM COATED ORAL at 06:12

## 2019-12-05 RX ADMIN — ATORVASTATIN CALCIUM 80 MG: 40 TABLET, FILM COATED ORAL at 08:12

## 2019-12-05 RX ADMIN — LEVETIRACETAM 750 MG: 500 SOLUTION ORAL at 08:12

## 2019-12-05 RX ADMIN — ENOXAPARIN SODIUM 30 MG: 100 INJECTION SUBCUTANEOUS at 05:12

## 2019-12-05 RX ADMIN — OXYBUTYNIN CHLORIDE 5 MG: 5 TABLET ORAL at 02:12

## 2019-12-05 RX ADMIN — METOPROLOL TARTRATE 12.5 MG: 25 TABLET, FILM COATED ORAL at 08:12

## 2019-12-05 RX ADMIN — TAMSULOSIN HYDROCHLORIDE 0.4 MG: 0.4 CAPSULE ORAL at 08:12

## 2019-12-05 NOTE — ASSESSMENT & PLAN NOTE
64 yr old with history of CVA, epilepsy, HTN, and DM presents with generalized weakness and difficulty speaking. Admitted to r/o CVA. On admit, found to have hypotension, leukocytosis, and lactic acidosis. Infectious work up initiated. Empiric antibiotics were started. ID now consulted for further recs.    Blood cultures NGTD  UA and urine culture negative  CXR negative  CT head/MRI/MRA brain negative.     Neurology following and suspicious for seizure activity contributing to leukocytosis/lactic acidosis.    Leukocytosis now completely resolved off of antibiotics. Pt feels back to his baseline.    Plan:  1. Continue off of antibiotics at this time as leukocytosis resolved and pt back to baseline  2. ID will sign off

## 2019-12-05 NOTE — PLAN OF CARE
CRYSTAL spoke with Liliana at hospitals  to complete LOCET. CRYSTAL faxed PASRR.      12/05/19 5561   Post-Acute Status   Post-Acute Authorization Placement  (SNF)   Post-Acute Placement Status Pending State Certification

## 2019-12-05 NOTE — PROGRESS NOTES
[facility]  Neurology  Progress Note    Patient Name: Richard M Pipkins  MRN: [id number]  Admission Date: 12/2/2019  Hospital Length of Stay: 3 days  Code Status: Full Code   Attending Provider: Elvis Weeks MD  Primary Care Physician: Annalee Campos MD   Principal Problem:Generalized weakness    Subjective:     Interval History: 65 y/o male with medical Hx as listed below comes to ED was brought to ED after family found him on the morning of admission to hospital unable to move and nonverbal. Upon arrival to ED pt was found to have a fever, leukocytosis, lactic acidosis. This morning he seems to be back to his baseline. Pt has Hx of stroke and brain rumor resection; left hemiparesis. Mr. Pipkins had Hx fo seizures. According to wife during a seizure pt rolls his eyes back and becomes unresponsive for a while hen slowly recovers. He is on levetiracetam 500 mg BID.     -12/4/19: No acute issues reported.    -12/5/19: Pt alert and following commands today.    Current Neurological Medications:     Current Facility-Administered Medications   Medication Dose Route Frequency Provider Last Rate Last Dose    0.9%  NaCl infusion   Intravenous Continuous Cathy Parmar  mL/hr at 12/04/19 2019      acetaminophen tablet 650 mg  650 mg Oral Q4H PRN Cathy Parmar MD        amLODIPine tablet 5 mg  5 mg Oral Daily Elvis Weeks MD   5 mg at 12/05/19 0847    aspirin chewable tablet 81 mg  81 mg Oral Daily Cathy Parmar MD   81 mg at 12/05/19 0847    atorvastatin tablet 80 mg  80 mg Oral QHS Cathy Parmar MD   80 mg at 12/04/19 2017    clopidogrel tablet 75 mg  75 mg Oral Once Cathy Parmar MD        dextrose 50% injection 12.5 g  12.5 g Intravenous PRN Cathy Parmar MD        enoxaparin injection 30 mg  30 mg Subcutaneous Daily Cathy Parmar MD   30 mg at 12/04/19 1800    glucagon (human recombinant) injection 1 mg  1 mg Intramuscular PRN Cathy Parmar MD        influenza  (HIGH-DOSE PF) vaccine 0.5 mL  0.5 mL Intramuscular Prior to discharge Cathy Parmar MD        insulin aspart U-100 pen 0-5 Units  0-5 Units Subcutaneous Q6H PRN Cathy Parmar MD        levetiracetam oral soln Soln 750 mg  750 mg Oral BID Trae Chapman MD   750 mg at 12/05/19 0847    metoprolol tartrate (LOPRESSOR) split tablet 12.5 mg  12.5 mg Oral Daily Elvis Weeks MD   12.5 mg at 12/05/19 0847    oxybutynin tablet 5 mg  5 mg Oral TID Cathy Parmar MD   5 mg at 12/05/19 0847    polyethylene glycol packet 17 g  17 g Oral BID PRN Cathy Parmar MD        promethazine (PHENERGAN) 6.25 mg in dextrose 5 % 50 mL IVPB  6.25 mg Intravenous Q6H PRN Cathy Parmar MD        sodium chloride 0.9% flush 10 mL  10 mL Intravenous PRN Cathy Pamrar MD        tamsulosin 24 hr capsule 0.4 mg  0.4 mg Oral Daily Cathy Parmar MD   0.4 mg at 12/05/19 0847       Review of Systems   Constitutional: Negative for fever.   HENT: Negative for trouble swallowing.    Eyes: Negative for photophobia.   Respiratory: Negative for shortness of breath.    Cardiovascular: Negative for chest pain.   Gastrointestinal: Negative for abdominal pain.   Genitourinary: Negative for dysuria.   Musculoskeletal: Negative for back pain.   Neurological: Negative for headaches.     Objective:     Vital Signs (Most Recent):  Temp: 97.9 °F (36.6 °C) (12/05/19 1142)  Pulse: 63 (12/05/19 1142)  Resp: 16 (12/05/19 1142)  BP: 117/63 (12/05/19 1142)  SpO2: 96 % (12/05/19 1142) Vital Signs (24h Range):  Temp:  [97.4 °F (36.3 °C)-99 °F (37.2 °C)] 97.9 °F (36.6 °C)  Pulse:  [63-77] 63  Resp:  [14-18] 16  SpO2:  [94 %-100 %] 96 %  BP: (117-175)/(63-87) 117/63     Weight: 86.9 kg (191 lb 9.3 oz)  Body mass index is 31.88 kg/m².    Physical Exam  Constitutional: No distress.   HENT:   Head: Normocephalic.   Eyes: Right eye exhibits no discharge. Left eye exhibits no discharge.   Neck: Normal range of motion.   Cardiovascular: Regular rhythm.    Pulmonary/Chest: Effort normal.   Abdominal: Bowel sounds are normal.   Musculoskeletal: He exhibits no edema.   Skin: He is not diaphoretic.         NEUROLOGICAL EXAMINATION:      MENTAL STATUS   Speech: slurred   Level of consciousness: alert     CRANIAL NERVES      CN III, IV, VI   Right pupil: Size: 3 mm. Shape: regular.   Left pupil: Size: 3 mm. Shape: regular.   Nystagmus: none   Ophthalmoparesis: none  Conjugate gaze: present     CN VII   Right facial weakness: none  Left facial weakness: central     CN XII   Tongue deviation: none     MOTOR EXAM        Left spastic hemiparesis  RIght : 5/5      SENSORY EXAM   Right arm light touch: normal  Left arm light touch: normal  Right leg light touch: normal  Left leg light touch: normal          Significant Labs:   CBC:   Recent Labs   Lab 12/04/19 0421 12/05/19  0747   WBC 19.96* 8.10   HGB 13.1* 13.5*   HCT 40.4 41.5    281     CMP:   Recent Labs   Lab 12/04/19 0421 12/05/19  0747   * 112*    143   K 4.2 3.8    109   CO2 26 27   BUN 17 11   CREATININE 1.2 1.0   CALCIUM 8.9 8.6*   ANIONGAP 9 7*   EGFRNONAA >60 >60           Assessment and Plan:     65 y/o male consulted for weakness     1. Weakness: is possible that pt had a seizure given his medical Hx.           Work up in process to see if any source of infection. Cultures negative to date. MRI brain shows no acute abnormalities.           -Levetiracetam 750 mg BID.    -Will follow as needed.    Active Diagnoses:    Diagnosis Date Noted POA    PRINCIPAL PROBLEM:  Generalized weakness [R53.1] 12/02/2019 Yes    Debility [R53.81] 12/04/2019 Yes    Change in behavior [R46.89] 12/03/2019 Yes    Leukocytosis [D72.829] 12/02/2019 Yes    Lactic acidosis [E87.2] 12/02/2019 Yes    ARF (acute renal failure) [N17.9] 12/02/2019 Yes    BPH (benign prostatic hyperplasia) [N40.0] 12/02/2019 Yes     Chronic    Hyperlipidemia [E78.5] 12/02/2019 Yes     Chronic    Epilepsy [G40.909] 12/02/2019  Yes     Chronic    Essential hypertension [I10] 03/11/2019 Yes    Type 2 diabetes mellitus, without long-term current use of insulin [E11.9] 03/09/2019 Yes    History of CVA (cerebrovascular accident) [Z86.73] 03/09/2019 Not Applicable      Problems Resolved During this Admission:    Diagnosis Date Noted Date Resolved POA    Change in behavior [R46.89] 12/02/2019 12/02/2019 Yes    Altered mental state [R41.82] 12/02/2019 12/02/2019 Yes       VTE Risk Mitigation (From admission, onward)         Ordered     enoxaparin injection 30 mg  Daily      12/02/19 1838     IP VTE LOW RISK PATIENT  Once      12/02/19 1837     Place NNAMDI hose  Until discontinued      12/02/19 1837                Trae Chapman MD  Neurology  [facility]

## 2019-12-05 NOTE — PLAN OF CARE
Problem: Adult Inpatient Plan of Care  Goal: Plan of Care Review  Outcome: Ongoing, Progressing    Patient remains free from injury and falls. Denies pain. Assists with turning in bed, skin intact. Aquacel foam dressing applied to sacrum. Incontinence care provided as needed. IVF infusing. Current plan of care continued.

## 2019-12-05 NOTE — PLAN OF CARE
12/05/19 1205   Discharge Reassessment   Assessment Type Discharge Planning Reassessment   Provided patient/caregiver education on the expected discharge date and the discharge plan Yes   Do you have any problems affording any of your prescribed medications? No   Discharge Plan A Skilled Nursing Facility   Discharge Plan B Home Health   DME Needed Upon Discharge  other (see comments)   Patient choice form signed by patient/caregiver N/A   Anticipated Discharge Disposition SNF   Post-Acute Status   Post-Acute Authorization Placement  (SNF)   Post-Acute Placement Status Referrals Sent   Patient choice form signed by patient/caregiver List with quality metrics by geographic area provided   Discharge Delays None known at this time

## 2019-12-05 NOTE — PLAN OF CARE
Problem: Occupational Therapy Goal  Goal: Occupational Therapy Goal  Description  Goals to be met by: 12/18/19     Patient will increase functional independence with ADLs by performing:    Feeding with Set-up Assistance.  UE Dressing with Set-up Assistance.  LE Dressing with Contact Guard Assistance.  Grooming while standing at sink with Stand-by Assistance.  Toileting from bedside commode with Stand-by Assistance for hygiene and clothing management.   Supine to sit with Contact Guard Assistance.  Step transfer with Stand-by Assistance  Toilet transfer to bedside commode with Stand-by Assistance.  Upper extremity exercise program x15 reps per handout, with supervision.     Outcome: Ongoing, Progressing   Pt tolerated session well, transferring from bed>chair with MIN A using RW and verbal cueing for sequencing and safety. Pt completed LUE AROM/AAROM with encouragement by cousin.

## 2019-12-05 NOTE — SUBJECTIVE & OBJECTIVE
Interval History: pt sleeping. Cousin at bedside.    Review of Systems   Constitutional: Positive for activity change. Negative for fever.   Genitourinary: Negative for dysuria.   Neurological: Positive for speech difficulty and weakness. Negative for facial asymmetry.     Objective:     Vital Signs (Most Recent):  Temp: 97.4 °F (36.3 °C) (12/05/19 0744)  Pulse: 68 (12/05/19 0744)  Resp: 18 (12/05/19 0744)  BP: (!) 144/87 (12/05/19 0951)  SpO2: 98 % (12/05/19 0744) Vital Signs (24h Range):  Temp:  [97.4 °F (36.3 °C)-99 °F (37.2 °C)] 97.4 °F (36.3 °C)  Pulse:  [64-85] 68  Resp:  [14-18] 18  SpO2:  [94 %-100 %] 98 %  BP: (143-175)/(76-87) 144/87     Weight: 86.9 kg (191 lb 9.3 oz)  Body mass index is 31.88 kg/m².    Estimated Creatinine Clearance: 74.7 mL/min (based on SCr of 1 mg/dL).    Physical Exam   Constitutional: He appears well-developed. No distress.   Pt is sleeping   HENT:   Head: Normocephalic and atraumatic.   Eyes: Conjunctivae are normal.   Neck: Neck supple.   Cardiovascular: Normal rate and regular rhythm.   Pulmonary/Chest: Effort normal and breath sounds normal.   Abdominal: Soft. Bowel sounds are normal.   Musculoskeletal: He exhibits no edema or deformity.   Neurological: He is alert. Coordination abnormal.   Left sided weakness   Skin: Skin is warm and dry. He is not diaphoretic.   Psychiatric: He is slowed. He is attentive.       Significant Labs:   Blood Culture:   Recent Labs   Lab 12/02/19  1400 12/02/19  1405   LABBLOO No Growth to date  No Growth to date  No Growth to date No Growth to date  No Growth to date  No Growth to date     CBC:   Recent Labs   Lab 12/04/19 0421 12/05/19 0747   WBC 19.96* 8.10   HGB 13.1* 13.5*   HCT 40.4 41.5    281     CMP:   Recent Labs   Lab 12/04/19  0421 12/05/19  0747    143   K 4.2 3.8    109   CO2 26 27   * 112*   BUN 17 11   CREATININE 1.2 1.0   CALCIUM 8.9 8.6*   ANIONGAP 9 7*   EGFRNONAA >60 >60       Significant Imaging:  I have reviewed all pertinent imaging results/findings within the past 24 hours.

## 2019-12-05 NOTE — PLAN OF CARE
Problem: Fall Injury Risk  Goal: Absence of Fall and Fall-Related Injury  Outcome: Ongoing, Progressing     Problem: Adult Inpatient Plan of Care  Goal: Plan of Care Review  Outcome: Ongoing, Progressing     Problem: Diabetes Comorbidity  Goal: Blood Glucose Level Within Desired Range  Outcome: Ongoing, Progressing     Problem: Infection  Goal: Infection Symptom Resolution  Outcome: Ongoing, Progressing

## 2019-12-05 NOTE — PLAN OF CARE
CRYSTAL spoke with Georgina at [facility] to inquire about SNF in network with insurance (reference number #[id number]). Georgina informed CRYSTAL that there are five facilities in [facility] with insurance in a 20 mile radius: [facility], [facility], [facility], and [facility]. CRYSTAL contacted patient's wife to inform that patient was denied for her top three choices but there was no answer. CRYSTAL left a detailed message. [facility], [facility], and [facility].        12/05/19 9595   Post-Acute Status   Post-Acute Authorization Placement  (SNF)   Post-Acute Placement Status Referrals Sent

## 2019-12-05 NOTE — PLAN OF CARE
SW met with patient at bedside to discuss options for SNF. SW inquired if patient's wife spoke to him about preferences in facilities. Patient said no. SW noticed that the list given to mother in law was on the cart. SW inquired if she could see the list. Patient said yes. Patient's wife put check [place]ckinley by Rachel PACK [place]. SW informed patient that referrals will be sent to the places selected by wife. Patient said ok.      12/05/19 1152   Post-Acute Status   Post-Acute Authorization Placement  (SNF)   Post-Acute Placement Status Referrals Sent

## 2019-12-05 NOTE — PT/OT/SLP PROGRESS
Occupational Therapy   Treatment    Name: Richard M Pipkins  MRN: [id number]  Admitting Diagnosis:  Generalized weakness       Recommendations:     Discharge Recommendations: nursing facility, skilled  Discharge Equipment Recommendations:  (Ongoing assessment pending pt progress for ambulation- refer to PT)  Barriers to discharge:  Decreased caregiver support    Assessment:     Richard M Pipkins is a 64 y.o. male with a medical diagnosis of Generalized weakness.  He presents with improved participation with functional tasks with encouragement by OT. Performance deficits affecting function are weakness, impaired self care skills, impaired balance, decreased safety awareness, decreased ROM, impaired endurance, impaired functional mobilty, decreased upper extremity function, gait instability, decreased lower extremity function, impaired cognition, impaired fine motor, impaired coordination.     Rehab Prognosis:  fair +; patient would benefit from acute skilled OT services to address these deficits and reach maximum level of function.       Plan:     Patient to be seen 5 x/week to address the above listed problems via self-care/home management, therapeutic activities, therapeutic exercises  · Plan of Care Expires: 12/18/19  · Plan of Care Reviewed with: patient(cousin)    Subjective     Chief complaint: no complaints   Patient's comments/goals: cousin present- she reports that he doesn't communicate much to people he isn't familiar with, but he will chat regularly with her; cousin eager to see how he is progressing     Pain/Comfort:  · Pain Rating 1: 0/10    Objective:     Communicated with: nurses, Hailee and Shena, prior to session.  Patient found HOB elevated asleep, but easily aroused by OT with bed alarm, oxygen, peripheral IV(1L NC) upon OT entry to room.    General Precautions: Standard, fall   Orthopedic Precautions:N/A   Braces: N/A     Occupational Performance:     Bed Mobility:    · Patient completed  Rolling/Turning to Left with  minimum assistance and with side rail  · Patient completed Scooting/Bridging with contact guard assistance and verbal cueing   · Patient completed Supine to Sit with moderate assistance, with side rail and HOB elevated     Functional Mobility/Transfers:  · Patient completed Sit <> Stand Transfer with moderate assistance  with  rolling walker   · Patient completed Bed <> Chair Transfer using Step Transfer technique with minimum assistance with rolling walker  · Functional Mobility: Pt ambulated a few steps from the bed > chair with RW and MIN A with verbal cueing for step sequence. Pt got distracted with the door opening and outside distractions, requiring increased cueing d/t poor safety awareness with L side. Pt required MIN A to LUE on RW d/t inability to maintain a . Pt's cousin reported that at home, he was unable to maintain a  with LUE on his rollator, but he managed with this deficit.     Activities of Daily Living:  · Grooming: moderate assistance with brushing his teeth. Pt required encouragement in order to attempt in opening tools for oral hygiene task. Pt required total A to take toothbrush out of wrapper. Pt attempted to hold toothpaste box with LUE and open box with RUE, but unable to complete despite increased time. Pt required OT assist with stabilizing the object while he opened multiple caps with his RUE. Pt required assist by OT to hold the basin while he brushed his teeth actively without assist. Pt required cueing for increased participation to wash his face.   · Upper Body Dressing: minimum assistance to janine back gown       [facility] 6 Click ADL: 15    Treatment & Education:  · Pt re-educated on OT role/POC.   · Importance of OOB activity with staff assistance.  · Importance of sitting up in the chair throughout the chair   · Safety during functional t/f and mobility with RW   · Increased time upon standing to correct body mechanics within the RW   · Multiple  self-care tasks/functional mobility completed- assistance level noted above   · Increased time taken for grooming to promote increased active participation  · Sitting in unsupported sit, pt completed the following LUE exercises:   · LUE digits flexion/extension 1x10 AROM  · LUE elbow flexion/extension 1x10 AAROM (assist by self with RUE)  · Pt was able to recall these two LUE AROM; pt's cousin was encouraging pt throughout exercises. OT demo'd other exercises to cousin for increased carryover.   · All questions/concerns answered within OT scope of practice       Patient left up in chair with all lines intact, call button in reach, chair alarm on, PCT, Tia, notified, and cousin presentEducation:      GOALS:   Multidisciplinary Problems     Occupational Therapy Goals        Problem: Occupational Therapy Goal    Goal Priority Disciplines Outcome Interventions   Occupational Therapy Goal     OT, PT/OT Ongoing, Progressing    Description:  Goals to be met by: 12/18/19     Patient will increase functional independence with ADLs by performing:    Feeding with Set-up Assistance.  UE Dressing with Set-up Assistance.  LE Dressing with Contact Guard Assistance.  Grooming while standing at sink with Stand-by Assistance.  Toileting from bedside commode with Stand-by Assistance for hygiene and clothing management.   Supine to sit with Contact Guard Assistance.  Step transfer with Stand-by Assistance  Toilet transfer to bedside commode with Stand-by Assistance.  Upper extremity exercise program x15 reps per handout, with supervision.                      Time Tracking:     OT Date of Treatment: 12/05/19  OT Start Time: 1000  OT Stop Time: 1045  OT Total Time (min): 45 min    Billable Minutes:Self Care/Home Management 25 min  Therapeutic Activity 20 min  Total Time 45 min    Tamiko Barron OT  12/5/2019

## 2019-12-05 NOTE — PROGRESS NOTES
[facility]  Infectious Disease  Progress Note    Patient Name: Richard M Pipkins  MRN: [id number]  Admission Date: 12/2/2019  Length of Stay: 3 days  Attending Physician: Elvis Weeks MD  Primary Care Provider: Annalee Campos MD    Isolation Status: No active isolations  Assessment/Plan:      Leukocytosis  64 yr old with history of CVA, epilepsy, HTN, and DM presents with generalized weakness and difficulty speaking. Admitted to r/o CVA. On admit, found to have hypotension, leukocytosis, and lactic acidosis. Infectious work up initiated. Empiric antibiotics were started. ID now consulted for further recs.    Blood cultures NGTD  UA and urine culture negative  CXR negative  CT head/MRI/MRA brain negative.     Neurology following and suspicious for seizure activity contributing to leukocytosis/lactic acidosis.    Leukocytosis now completely resolved off of antibiotics. Pt feels back to his baseline.    Plan:  1. Continue off of antibiotics at this time as leukocytosis resolved and pt back to baseline  2. ID will sign off      Thank you for your consult. I will sign off. Please contact us if you have any additional questions.  SURAJ Roblero Pager: [phone]  Infectious Disease  [facility]    Subjective:     Principal Problem:Generalized weakness    HPI: This is a 64 year old male with PMH of CVA (residual left sided weakness), DM, epilepsy, and HTN who came in with generalized weakness. Per chart review, patient's family stated he could not walk on his own and was also aphasic. Pt was admitted to rule out stroke. Labs were significant for leukocytosis, hypotension and lactic acidosis. He was afebrile. Pt was started on empiric antibiotics given these findings. Blood cultures were sent and NGTD. UA was obtained and was negative.     CT head was negative. MRI/MRA brain negative. CXR negative.    No family at bedside. Upon questioning, pt reports continued weakness. He  denies known seizure activity. He denies fevers at home. No cough, sore throat, or sick contacts. Denies rash. No abdominal pain or watery diarrhea. Only complaint is burning with urination. Denies other urinary symptoms.     Neurology was consulted and thought pt may have had seizure activity which contributed to lactic acidosis/leukocytosis.     Interval History: pt sleeping. Cousin at bedside.    Review of Systems   Constitutional: Positive for activity change. Negative for fever.   Genitourinary: Negative for dysuria.   Neurological: Positive for speech difficulty and weakness. Negative for facial asymmetry.     Objective:     Vital Signs (Most Recent):  Temp: 97.4 °F (36.3 °C) (12/05/19 0744)  Pulse: 68 (12/05/19 0744)  Resp: 18 (12/05/19 0744)  BP: (!) 144/87 (12/05/19 0951)  SpO2: 98 % (12/05/19 0744) Vital Signs (24h Range):  Temp:  [97.4 °F (36.3 °C)-99 °F (37.2 °C)] 97.4 °F (36.3 °C)  Pulse:  [64-85] 68  Resp:  [14-18] 18  SpO2:  [94 %-100 %] 98 %  BP: (143-175)/(76-87) 144/87     Weight: 86.9 kg (191 lb 9.3 oz)  Body mass index is 31.88 kg/m².    Estimated Creatinine Clearance: 74.7 mL/min (based on SCr of 1 mg/dL).    Physical Exam   Constitutional: He appears well-developed. No distress.   Pt is sleeping   HENT:   Head: Normocephalic and atraumatic.   Eyes: Conjunctivae are normal.   Neck: Neck supple.   Cardiovascular: Normal rate and regular rhythm.   Pulmonary/Chest: Effort normal and breath sounds normal.   Abdominal: Soft. Bowel sounds are normal.   Musculoskeletal: He exhibits no edema or deformity.   Neurological: He is alert. Coordination abnormal.   Left sided weakness   Skin: Skin is warm and dry. He is not diaphoretic.   Psychiatric: He is slowed. He is attentive.       Significant Labs:   Blood Culture:   Recent Labs   Lab 12/02/19  1400 12/02/19  1405   LABBLOO No Growth to date  No Growth to date  No Growth to date No Growth to date  No Growth to date  No Growth to date     CBC:    Recent Labs   Lab 12/04/19  0421 12/05/19  0747   WBC 19.96* 8.10   HGB 13.1* 13.5*   HCT 40.4 41.5    281     CMP:   Recent Labs   Lab 12/04/19  0421 12/05/19  0747    143   K 4.2 3.8    109   CO2 26 27   * 112*   BUN 17 11   CREATININE 1.2 1.0   CALCIUM 8.9 8.6*   ANIONGAP 9 7*   EGFRNONAA >60 >60       Significant Imaging: I have reviewed all pertinent imaging results/findings within the past 24 hours.

## 2019-12-05 NOTE — PT/OT/SLP PROGRESS
Physical Therapy Treatment    Patient Name:  Richard M Pipkins   MRN:  [id number]    Recommendations:     Discharge Recommendations:  nursing facility, skilled   Discharge Equipment Recommendations: walker, david   Barriers to discharge: Decreased caregiver support    Assessment:     Richard M Pipkins is a 64 y.o. male admitted with a medical diagnosis of Generalized weakness.  He presents with the following impairments/functional limitations:  weakness, impaired self care skills, impaired balance, decreased coordination, decreased safety awareness, decreased ROM, impaired endurance, impaired functional mobilty, decreased upper extremity function, impaired coordination, gait instability, impaired cognition, decreased lower extremity function, impaired cardiopulmonary response to activity Pt progressing, continue with POC.    Rehab Prognosis: Good; patient would benefit from acute skilled PT services to address these deficits and reach maximum level of function.    Recent Surgery: * No surgery found *      Plan:     During this hospitalization, patient to be seen 5 x/week to address the identified rehab impairments via gait training, therapeutic activities, therapeutic exercises and progress toward the following goals:    · Plan of Care Expires:  12/18/19    Subjective     Chief Complaint: wants to get back in bed.  Has been sitting up in chair all day  Patient/Family Comments/goals: SNF  Pain/Comfort:  Pain Rating 1: 0/10      Objective:     Communicated with nsg prior to session.  Patient found up in chair with peripheral IV, oxygen upon PT entry to room.     General Precautions: Standard, fall   Orthopedic Precautions:N/A   Braces: N/A     Functional Mobility:  · Bed Mobility:     · Scooting: contact guard assistance  · Bridging: minimum assistance  · Sit to Supine: maximal assistance  · Transfers:     · Sit to Stand:  minimum assistance and with VC/TC for hand placement/safety with QC and with HW   · Bed <> Chair:  minimum assistance with  no AD  using  Stand Pivot and VC/TC for hand placement/safety   · Gait: 25' with HW and CGA/Min A with max VC for sequencing with HW and HW management/safety, max decreased evelin  · Balance: Fair+ sit, Fair stand      AM-PAC 6 CLICK MOBILITY  Turning over in bed (including adjusting bedclothes, sheets and blankets)?: 3  Sitting down on and standing up from a chair with arms (e.g., wheelchair, bedside commode, etc.): 3  Moving from lying on back to sitting on the side of the bed?: 2  Moving to and from a bed to a chair (including a wheelchair)?: 3  Need to walk in hospital room?: 3  Climbing 3-5 steps with a railing?: 2  Basic Mobility Total Score: 16       Therapeutic Activities and Exercises:   Bed mobility, t/f, and gait training as above.  Pt educated to perform B Ap's and TKE's while in bed.  Pt verbalized/demonstrated understanding with Max VC/TC   Patient left HOB elevated with all lines intact, call button in reach, bed alarm on, nsg notified and family present..    GOALS:   Multidisciplinary Problems     Physical Therapy Goals        Problem: Physical Therapy Goal    Goal Priority Disciplines Outcome Goal Variances Interventions   Physical Therapy Goal     PT, PT/OT      Description:  Goals to be met by: 2019     Patient will increase functional independence with mobility by performin. Supine to sit with Modified [place]  2. Sit to stand transfer with Modified [place]  3. Gait  x  feet with Supervision using Franky-walker/Quad cane.   4. Lower extremity exercise program x10 reps per handout, with assistance as needed                      Time Tracking:     PT Received On: 19  PT Start Time: 1357     PT Stop Time: 1420  PT Total Time (min): 23 min     Billable Minutes: Gait Training 15 and Therapeutic Activity 8    Treatment Type: Treatment  PT/PTA: PT     PTA Visit Number: 0     Rivka Childress, PT  2019

## 2019-12-05 NOTE — ASSESSMENT & PLAN NOTE
As discussed above  Will continue monitor    Will consult ID today    WBC count elevation has resolved. May have been from seizure. ID stopped Abx and signed off.

## 2019-12-05 NOTE — PROGRESS NOTES
[facility] Hospital Medicine  Progress Note    Patient Name: Richard M Pipkins  MRN: [id number]  Patient Class: IP- Inpatient   Admission Date: 12/2/2019  Length of Stay: 3 days  Attending Physician: Elvis Weeks MD  Primary Care Provider: Annalee Campos MD        Subjective:     Principal Problem:Generalized weakness        HPI:  65 y/o male with hx of CVA with left sided weakness presents to the ER via EMS for an evaluation of generalized weakness since this morning.  Patient could not walk on his own this morning and was not talking.  Patient's most recent known normal was 7pm last night when he went to bed.  He has chronic bilateral L>R weakness and chronic incontinence but has not had incontinence today. Patient's wife was concerned because he has not been mobile or talking today.  This is different from his baseline.  Patient was evaluated for possible stroke in ER.  Labs then showed significant leukocytosis and lactic acidosis.  Patient's wife denies any fevers, chills, cough, shortness of breath or any other complaints.    Overview/Hospital Course:  Mr. Pipkins presented with generalized weakness and changes in activity level.  Initial head CT unremarkable.  He was also noted to have leukocytosis, hypotension and lactic acidosis with no obvious source of infection.  Blood and urine cultures obtained.  Empiric antibiotics of cefepime and vancomycin initiated.  Neurology consulted and started on anti-seizure meds for possible seizures. The patient had a sig. WBC elevation with negative urine and blood cultures.  Prophylactic Abx's were started in the ICU. Patient's hypotension resolved and the patient was transferred out of the ICU on 12/3.  ID was consulted on 12/4. PT/OT were consulted and recommended SNF. ID noted that the leukocytosis may have been reactive to possible seizure and given all negative work up- stopped all Abx. Leukocytosis resolved.     Interval History: No  new reported issues   Review of Systems   Unable to perform ROS: Dementia   Constitutional: Negative for activity change.     Objective:     Vital Signs (Most Recent):  Temp: 97.4 °F (36.3 °C) (12/05/19 0744)  Pulse: 68 (12/05/19 0744)  Resp: 18 (12/05/19 0744)  BP: (!) 144/87 (12/05/19 0951)  SpO2: 98 % (12/05/19 0744) Vital Signs (24h Range):  Temp:  [97.4 °F (36.3 °C)-99 °F (37.2 °C)] 97.4 °F (36.3 °C)  Pulse:  [64-85] 68  Resp:  [14-18] 18  SpO2:  [94 %-100 %] 98 %  BP: (143-175)/(76-87) 144/87     Weight: 86.9 kg (191 lb 9.3 oz)  Body mass index is 31.88 kg/m².    Intake/Output Summary (Last 24 hours) at 12/5/2019 1058  Last data filed at 12/5/2019 0600  Gross per 24 hour   Intake 3470.84 ml   Output --   Net 3470.84 ml      Physical Exam   Constitutional: He is oriented to person, place, and time. He appears well-developed and well-nourished.   HENT:   Head: Normocephalic and atraumatic.   Cardiovascular: Normal rate and regular rhythm.   Pulmonary/Chest: Effort normal. No respiratory distress. He has no wheezes. He has no rales.   Neurological: He is alert and oriented to person, place, and time.   Skin: Skin is warm and dry.   Psychiatric: He has a normal mood and affect. His behavior is normal.   Nursing note and vitals reviewed.      Significant Labs:   BMP:   Recent Labs   Lab 12/05/19  0747   *      K 3.8      CO2 27   BUN 11   CREATININE 1.0   CALCIUM 8.6*     CBC:   Recent Labs   Lab 12/04/19  0421 12/05/19  0747   WBC 19.96* 8.10   HGB 13.1* 13.5*   HCT 40.4 41.5    281       Significant Imaging:       Assessment/Plan:      * Generalized weakness  Patient presents with generalized weakness and apparent aphasia.  Evaluated for CVA in ER, but then noted to have significant leukocytosis and lactic acidosis.  Workup with MRI negative for acute CVA and MRA with noted 50% stenosis of left ICA distal left M1 segment, but no occlusion, aneurysm or vascular malformation  Neurology  consulted and final input pending  Unsure etiology of leukocytosis and lactic acidosis.    Slightly hypotensive on presentation and admitted to ICU.  No obvious source of infection  Possible hypovolemia a factor given improvement with IV fluids versus medication induced  Empirically started on broad spectrum antibiotics of cefepime and vancomycin  Patient does have hx of seizures, possible seizure causing current symptoms and contributing to leukocytosis.  Clinically improved but may need EEG  Blood and urine cultures no growth to date  Will continue IV fluids and continue to hold all anti hypertensive medications  Will deescalate antibiotics with stopping vancomycin today, but will continue cefepime for now  Consult placed to ID given increasing WBC count  Will follow up Neurology recommendations  Stable for transfer to the floor today  Consider stopping all antibiotics tomorrow if he remains afebrile and follow up on ID recommendations  Consult PT and OT    Will likely need SNF and wife favoring this. PPD and social work consult.      Debility  Consult PT/OT. Will need SNF. PPD and social work consult       Change in behavior  As discussed above    Epilepsy  As discussed above.  Continue levetiracetum    Hyperlipidemia  Continue atorvastatin    BPH (benign prostatic hyperplasia)  Resume home medications.    ARF (acute renal failure)  Creatinine higher than baseline which is somewhere between 1.1 to 1.4  Unsure etiology, but likely pre renal  Renal function improving with IV hydration alone  Avoid nephrotoxic medications.  Continue IV fluids   Repeat laboratory testing in a.m.    Resolved     Lactic acidosis  Unclear etiology  Possibly hypovolemia with resultant hypoperfusion versus infectious  Clinically improving      Leukocytosis  As discussed above  Will continue monitor    Will consult ID today    WBC count elevation has resolved. May have been from seizure. ID stopped Abx and signed off.     Essential  hypertension  Permissive HTN while being ruled out for CVA.  However, blood pressure remains in the normal range and not hypertensive  Continue to hold all medications for now  Patient may need medication adjustment    History of CVA (cerebrovascular accident)  Chronic residual left sided weakness  Continue aspirin, Plavix and atorvastatin  As discussed above    Type 2 diabetes mellitus, without long-term current use of insulin  Hemoglobin A1c 7.6  Blood glucose fluctuated between 70 - 178  Continue sliding scale for now and diet advanced to diabetic after swallowing test passed      VTE Risk Mitigation (From admission, onward)         Ordered     enoxaparin injection 30 mg  Daily      12/02/19 1838     IP VTE LOW RISK PATIENT  Once      12/02/19 1837     Place NNAMDI hose  Until discontinued      12/02/19 1837              No new issues.  Will d/c to SNF when arranged. May not be until Monday      Elvis Rondon MD  Department of Hospital Medicine   [facility]

## 2019-12-05 NOTE — SUBJECTIVE & OBJECTIVE
Interval History: No new reported issues   Review of Systems   Unable to perform ROS: Dementia   Constitutional: Negative for activity change.     Objective:     Vital Signs (Most Recent):  Temp: 97.4 °F (36.3 °C) (12/05/19 0744)  Pulse: 68 (12/05/19 0744)  Resp: 18 (12/05/19 0744)  BP: (!) 144/87 (12/05/19 0951)  SpO2: 98 % (12/05/19 0744) Vital Signs (24h Range):  Temp:  [97.4 °F (36.3 °C)-99 °F (37.2 °C)] 97.4 °F (36.3 °C)  Pulse:  [64-85] 68  Resp:  [14-18] 18  SpO2:  [94 %-100 %] 98 %  BP: (143-175)/(76-87) 144/87     Weight: 86.9 kg (191 lb 9.3 oz)  Body mass index is 31.88 kg/m².    Intake/Output Summary (Last 24 hours) at 12/5/2019 1058  Last data filed at 12/5/2019 0600  Gross per 24 hour   Intake 3470.84 ml   Output --   Net 3470.84 ml      Physical Exam   Constitutional: He is oriented to person, place, and time. He appears well-developed and well-nourished.   HENT:   Head: Normocephalic and atraumatic.   Cardiovascular: Normal rate and regular rhythm.   Pulmonary/Chest: Effort normal. No respiratory distress. He has no wheezes. He has no rales.   Neurological: He is alert and oriented to person, place, and time.   Skin: Skin is warm and dry.   Psychiatric: He has a normal mood and affect. His behavior is normal.   Nursing note and vitals reviewed.      Significant Labs:   BMP:   Recent Labs   Lab 12/05/19  0747   *      K 3.8      CO2 27   BUN 11   CREATININE 1.0   CALCIUM 8.6*     CBC:   Recent Labs   Lab 12/04/19  0421 12/05/19  0747   WBC 19.96* 8.10   HGB 13.1* 13.5*   HCT 40.4 41.5    281       Significant Imaging:

## 2019-12-06 LAB
ANION GAP SERPL CALC-SCNC: 9 MMOL/L (ref 8–16)
BASOPHILS # BLD AUTO: 0.02 K/UL (ref 0–0.2)
BASOPHILS NFR BLD: 0.3 % (ref 0–1.9)
BUN SERPL-MCNC: 11 MG/DL (ref 8–23)
CALCIUM SERPL-MCNC: 8.2 MG/DL (ref 8.7–10.5)
CHLORIDE SERPL-SCNC: 108 MMOL/L (ref 95–110)
CO2 SERPL-SCNC: 25 MMOL/L (ref 23–29)
CREAT SERPL-MCNC: 1 MG/DL (ref 0.5–1.4)
DIFFERENTIAL METHOD: ABNORMAL
EOSINOPHIL # BLD AUTO: 0.2 K/UL (ref 0–0.5)
EOSINOPHIL NFR BLD: 2.7 % (ref 0–8)
ERYTHROCYTE [DISTWIDTH] IN BLOOD BY AUTOMATED COUNT: 14.6 % (ref 11.5–14.5)
EST. GFR  (AFRICAN AMERICAN): >60 ML/MIN/1.73 M^2
EST. GFR  (NON AFRICAN AMERICAN): >60 ML/MIN/1.73 M^2
GLUCOSE SERPL-MCNC: 139 MG/DL (ref 70–110)
HCT VFR BLD AUTO: 39 % (ref 40–54)
HGB BLD-MCNC: 12.8 G/DL (ref 14–18)
IMM GRANULOCYTES # BLD AUTO: 0.04 K/UL (ref 0–0.04)
IMM GRANULOCYTES NFR BLD AUTO: 0.6 % (ref 0–0.5)
LYMPHOCYTES # BLD AUTO: 2.9 K/UL (ref 1–4.8)
LYMPHOCYTES NFR BLD: 43.6 % (ref 18–48)
MCH RBC QN AUTO: 29.6 PG (ref 27–31)
MCHC RBC AUTO-ENTMCNC: 32.8 G/DL (ref 32–36)
MCV RBC AUTO: 90 FL (ref 82–98)
MONOCYTES # BLD AUTO: 0.3 K/UL (ref 0.3–1)
MONOCYTES NFR BLD: 3.9 % (ref 4–15)
NEUTROPHILS # BLD AUTO: 3.2 K/UL (ref 1.8–7.7)
NEUTROPHILS NFR BLD: 48.9 % (ref 38–73)
NRBC BLD-RTO: 0 /100 WBC
PLATELET # BLD AUTO: 345 K/UL (ref 150–350)
PMV BLD AUTO: 10.7 FL (ref 9.2–12.9)
POCT GLUCOSE: 118 MG/DL (ref 70–110)
POCT GLUCOSE: 136 MG/DL (ref 70–110)
POCT GLUCOSE: 140 MG/DL (ref 70–110)
POTASSIUM SERPL-SCNC: 3.3 MMOL/L (ref 3.5–5.1)
RBC # BLD AUTO: 4.33 M/UL (ref 4.6–6.2)
SODIUM SERPL-SCNC: 142 MMOL/L (ref 136–145)
WBC # BLD AUTO: 6.6 K/UL (ref 3.9–12.7)

## 2019-12-06 PROCEDURE — 63600175 PHARM REV CODE 636 W HCPCS: Performed by: HOSPITALIST

## 2019-12-06 PROCEDURE — 94761 N-INVAS EAR/PLS OXIMETRY MLT: CPT

## 2019-12-06 PROCEDURE — 25000003 PHARM REV CODE 250: Performed by: HOSPITALIST

## 2019-12-06 PROCEDURE — 97110 THERAPEUTIC EXERCISES: CPT

## 2019-12-06 PROCEDURE — 85025 COMPLETE CBC W/AUTO DIFF WBC: CPT

## 2019-12-06 PROCEDURE — 25000003 PHARM REV CODE 250: Performed by: PSYCHIATRY & NEUROLOGY

## 2019-12-06 PROCEDURE — 97116 GAIT TRAINING THERAPY: CPT

## 2019-12-06 PROCEDURE — 11000001 HC ACUTE MED/SURG PRIVATE ROOM

## 2019-12-06 PROCEDURE — 97530 THERAPEUTIC ACTIVITIES: CPT

## 2019-12-06 PROCEDURE — 80048 BASIC METABOLIC PNL TOTAL CA: CPT

## 2019-12-06 PROCEDURE — 25000003 PHARM REV CODE 250: Performed by: INTERNAL MEDICINE

## 2019-12-06 PROCEDURE — 36415 COLL VENOUS BLD VENIPUNCTURE: CPT

## 2019-12-06 PROCEDURE — 27000221 HC OXYGEN, UP TO 24 HOURS

## 2019-12-06 RX ORDER — HYDRALAZINE HYDROCHLORIDE 25 MG/1
50 TABLET, FILM COATED ORAL EVERY 8 HOURS
Status: DISCONTINUED | OUTPATIENT
Start: 2019-12-06 | End: 2019-12-07

## 2019-12-06 RX ORDER — POTASSIUM CHLORIDE 20 MEQ/1
60 TABLET, EXTENDED RELEASE ORAL ONCE
Status: COMPLETED | OUTPATIENT
Start: 2019-12-06 | End: 2019-12-06

## 2019-12-06 RX ADMIN — HYDRALAZINE HYDROCHLORIDE 50 MG: 25 TABLET, FILM COATED ORAL at 10:12

## 2019-12-06 RX ADMIN — ASPIRIN 81 MG 81 MG: 81 TABLET ORAL at 09:12

## 2019-12-06 RX ADMIN — ENOXAPARIN SODIUM 30 MG: 100 INJECTION SUBCUTANEOUS at 05:12

## 2019-12-06 RX ADMIN — TAMSULOSIN HYDROCHLORIDE 0.4 MG: 0.4 CAPSULE ORAL at 09:12

## 2019-12-06 RX ADMIN — OXYBUTYNIN CHLORIDE 5 MG: 5 TABLET ORAL at 02:12

## 2019-12-06 RX ADMIN — LEVETIRACETAM 750 MG: 500 SOLUTION ORAL at 10:12

## 2019-12-06 RX ADMIN — HYDRALAZINE HYDROCHLORIDE 50 MG: 25 TABLET, FILM COATED ORAL at 02:12

## 2019-12-06 RX ADMIN — AMLODIPINE BESYLATE 5 MG: 5 TABLET ORAL at 09:12

## 2019-12-06 RX ADMIN — METOPROLOL TARTRATE 12.5 MG: 25 TABLET, FILM COATED ORAL at 09:12

## 2019-12-06 RX ADMIN — OXYBUTYNIN CHLORIDE 5 MG: 5 TABLET ORAL at 10:12

## 2019-12-06 RX ADMIN — OXYBUTYNIN CHLORIDE 5 MG: 5 TABLET ORAL at 09:12

## 2019-12-06 RX ADMIN — HYDRALAZINE HYDROCHLORIDE 25 MG: 25 TABLET, FILM COATED ORAL at 05:12

## 2019-12-06 RX ADMIN — LEVETIRACETAM 750 MG: 500 SOLUTION ORAL at 09:12

## 2019-12-06 RX ADMIN — POTASSIUM CHLORIDE 60 MEQ: 1500 TABLET, EXTENDED RELEASE ORAL at 02:12

## 2019-12-06 RX ADMIN — ATORVASTATIN CALCIUM 80 MG: 40 TABLET, FILM COATED ORAL at 10:12

## 2019-12-06 NOTE — PLAN OF CARE
CRYSTAL met with patient and wife to discuss SNF options. SW informed wife that her top facilities were declined. SW informed wife that referrals were sent to facilities in network and patient was accepted by [facility]. Wife stated that patient can go there. CRYSTAL contacted Bradley at [facility] and informed that patient's family accepted. CRYSTAL also informed that patient will be discharging on Monday. Bradley stated she will call wife.     12/06/19 3291   Post-Acute Status   Post-Acute Authorization Placement  (SNF - [facility])   Post-Acute Placement Status Pending Service Contract   Patient choice form signed by patient/caregiver List with quality metrics by geographic area provided

## 2019-12-06 NOTE — PLAN OF CARE
Problem: Occupational Therapy Goal  Goal: Occupational Therapy Goal  Description  Goals to be met by: 12/18/19     Patient will increase functional independence with ADLs by performing:    Feeding with Set-up Assistance.  UE Dressing with Set-up Assistance.  LE Dressing with Contact Guard Assistance.  Grooming while standing at sink with Stand-by Assistance.  Toileting from bedside commode with Stand-by Assistance for hygiene and clothing management.   Supine to sit with Contact Guard Assistance.  Step transfer with Stand-by Assistance  Toilet transfer to bedside commode with Stand-by Assistance.  Upper extremity exercise program x15 reps per handout, with supervision.     Outcome: Ongoing, Progressing   Pt tolerated short ambulation within the room with david-walker with consistent verbal cueing for step sequence/safety. Pt sat EOB unsupported completing BUE exercises: yellow theraband for RUE, AROM for LUE.

## 2019-12-06 NOTE — PT/OT/SLP PROGRESS
Occupational Therapy   Treatment    Name: Richard M Pipkins  MRN: [id number]  Admitting Diagnosis:  Generalized weakness       Recommendations:     Discharge Recommendations: nursing facility, skilled  Discharge Equipment Recommendations:  walker, david  Barriers to discharge:  Decreased caregiver support    Assessment:     Richard M Pipkins is a 64 y.o. male with a medical diagnosis of Generalized weakness. Performance deficits affecting function are weakness, impaired functional mobilty, impaired cognition, decreased safety awareness, impaired endurance, gait instability, impaired fine motor, impaired balance, decreased upper extremity function, impaired self care skills, decreased lower extremity function, decreased ROM, decreased coordination.     Pt with limited communication today and no family present until the end. Pt participated with session with prompting and answered questions if spoken to, but minimal.  Pt tolerated short ambulation within the room with david-walker with consistent verbal cueing for step sequence/safety. Pt sat EOB unsupported completing BUE exercises: yellow theraband for RUE, AROM for LUE.     Rehab Prognosis:  Fair +; patient would benefit from acute skilled OT services to address these deficits and reach maximum level of function.       Plan:     Patient to be seen 5 x/week to address the above listed problems via self-care/home management, therapeutic activities, therapeutic exercises  · Plan of Care Expires: 12/18/19  · Plan of Care Reviewed with: patient, spouse, other (see comments)(mother-in-law)    Subjective     Chief complaint: none reported by pt   Patient's comments/goals: pt reported today's walk with hemiwalker within the room was harder than yesterday- unable to state why     Pain/Comfort:  · Pain Rating 1: 0/10    Objective:     Communicated with: nurseNajma, prior to session.  Patient found HOB elevated with bed alarm upon OT entry to room.    General Precautions:  Standard, fall   Orthopedic Precautions:N/A   Braces: N/A     Occupational Performance:     Bed Mobility:    · Patient completed Rolling/Turning to Left with  contact guard assistance, with side rail and HOB elevated  · Patient completed Scooting/Bridging with contact guard assistance and consistent verbal cueing  · Patient completed Supine to Sit with contact guard assistance, with side rail and HOB elevated- increased time required     Functional Mobility/Transfers:  · Patient completed Sit <> Stand Transfer with moderate assistance  with  hemiwalker   · Patient completed Bed <> Chair Transfer using Step Transfer technique with minimum assistance with hemiwalker  · Functional Mobility: Upon standing, pt demo'd narrow BLE LEONARD and required tactile and verbal cueing while standing with MIN A for balance to correct. Pt ambulated short distance within the room with MIN A using hemiwalker with consistent verbal cueing to attend to task, step/HW sequence, and to take longer steps. Pt required consistent cueing for hand placement and body mechanics upon sitting in the chair for increased safety.     Activities of Daily Living:  · Feeding:  OT attempted first treatment, but pt was eating in bed (with set-up and chair in bed position- food spread over his chest/gown) Pt did not want to be repositioned.   · Upper Body Dressing: moderate assistance to change gowns on second attempt of session       [facility] 6 Click ADL: 15    Treatment & Education:  · Pt re-educated on OT role/POC.   · Importance of OOB activity with staff assistance.  · Importance of sitting up in the chair throughout the day and completing ankle pumps throughout the day   · Safety during functional t/f and mobility using hemiwalker- increased time to complete all aspects of functional mobility   · Pt completed 1 x 15 RUE HEP with yellow theraband in unsupported sitting position:  · Shoulder horizontal ab/dduction  · External rotation  · Shoulder  flexion/extension with ab/dduction  · Elbow extension  · Elbow flexion   · Pt required OT assist for stabilizing band d/t AROM limitations with LUE  · OT edu wife at end of session once she arrived on how to assist with each exercise; OT len'd this with wife for improved carryover  · Handout placed in blue folder with theraband placed within reach of pt   · Pt encouraged to complete 2-3 x 10-15  reps a day   Pt completed 1 x 15 LUE  in unsupported sitting position:  Digits I-V flexion/extension AROM  Wrist flexion/extension AAROM  Forearm pronation/supination AAROM  Elbow flexion/extension AAROM  OT demo'd these to wife once she arrived to increase carryover  Handout placed on lap tray for pt and family to review   Pt encouraged to complete 2-3 x 10-15  reps a day  · Multiple self-care tasks/functional mobility completed- assistance level noted above   · All questions/concerns answered within OT scope of practice       Patient left up in chair with all lines intact, call button in reach, chair alarm on, nurse, Najma, notified and spouse and mother-in-law presentEducation:      GOALS:   Multidisciplinary Problems     Occupational Therapy Goals        Problem: Occupational Therapy Goal    Goal Priority Disciplines Outcome Interventions   Occupational Therapy Goal     OT, PT/OT Ongoing, Progressing    Description:  Goals to be met by: 12/18/19     Patient will increase functional independence with ADLs by performing:    Feeding with Set-up Assistance.  UE Dressing with Set-up Assistance.  LE Dressing with Contact Guard Assistance.  Grooming while standing at sink with Stand-by Assistance.  Toileting from bedside commode with Stand-by Assistance for hygiene and clothing management.   Supine to sit with Contact Guard Assistance.  Step transfer with Stand-by Assistance  Toilet transfer to bedside commode with Stand-by Assistance.  Upper extremity exercise program x15 reps per handout, with supervision.                       Time Tracking:     OT Date of Treatment: 12/06/19  OT Start Time: 0955  OT Stop Time: 1034  OT Total Time (min): 39 min    Billable Minutes:Therapeutic Activity 15 min  Therapeutic Exercise 24 min  Total Time 39 min    Tamiko Barron OT  12/6/2019

## 2019-12-06 NOTE — PROGRESS NOTES
[facility] Hospital Medicine  Progress Note    Patient Name: Richard M Pipkins  MRN: [id number]  Patient Class: IP- Inpatient   Admission Date: 12/2/2019  Length of Stay: 4 days  Attending Physician: Elvis Weeks MD  Primary Care Provider: Annalee Campos MD        Subjective:     Principal Problem:Generalized weakness        HPI:  65 y/o male with hx of CVA with left sided weakness presents to the ER via EMS for an evaluation of generalized weakness since this morning.  Patient could not walk on his own this morning and was not talking.  Patient's most recent known normal was 7pm last night when he went to bed.  He has chronic bilateral L>R weakness and chronic incontinence but has not had incontinence today. Patient's wife was concerned because he has not been mobile or talking today.  This is different from his baseline.  Patient was evaluated for possible stroke in ER.  Labs then showed significant leukocytosis and lactic acidosis.  Patient's wife denies any fevers, chills, cough, shortness of breath or any other complaints.    Overview/Hospital Course:  Mr. Pipkins presented with generalized weakness and changes in activity level.  Initial head CT unremarkable.  He was also noted to have leukocytosis, hypotension and lactic acidosis with no obvious source of infection.  Blood and urine cultures obtained.  Empiric antibiotics of cefepime and vancomycin initiated.  Neurology consulted and started on anti-seizure meds for possible seizures. The patient had a sig. WBC elevation with negative urine and blood cultures.  Prophylactic Abx's were started in the ICU. Patient's hypotension resolved and the patient was transferred out of the ICU on 12/3.  ID was consulted on 12/4. PT/OT were consulted and recommended SNF. ID noted that the leukocytosis may have been reactive to possible seizure and given all negative work up- stopped all Abx. Leukocytosis resolved. ID consulted and  recommended to stop Abx and monitor.     Interval History: No new issues     Review of Systems   Unable to perform ROS: Dementia   Constitutional: Negative for activity change.   Eyes: Negative for discharge.   Respiratory: Negative for chest tightness and shortness of breath.    Cardiovascular: Negative for chest pain.   Gastrointestinal: Negative for abdominal pain.   Genitourinary: Negative for difficulty urinating.   Musculoskeletal: Negative for arthralgias.   Psychiatric/Behavioral: Negative for agitation.     Objective:     Vital Signs (Most Recent):  Temp: 98.2 °F (36.8 °C) (12/06/19 0723)  Pulse: 82 (12/06/19 0723)  Resp: 16 (12/06/19 0723)  BP: (!) 155/94 (12/06/19 0723)  SpO2: 99 % (12/06/19 0723) Vital Signs (24h Range):  Temp:  [97.9 °F (36.6 °C)-98.7 °F (37.1 °C)] 98.2 °F (36.8 °C)  Pulse:  [63-82] 82  Resp:  [16-18] 16  SpO2:  [94 %-99 %] 99 %  BP: (117-169)/(63-94) 155/94     Weight: 86.9 kg (191 lb 9.3 oz)  Body mass index is 31.88 kg/m².    Intake/Output Summary (Last 24 hours) at 12/6/2019 1005  Last data filed at 12/6/2019 0900  Gross per 24 hour   Intake 2037 ml   Output 1 ml   Net 2036 ml      Physical Exam   Constitutional: He is oriented to person, place, and time. He appears well-developed and well-nourished.   HENT:   Head: Normocephalic and atraumatic.   Cardiovascular: Normal rate and regular rhythm.   Pulmonary/Chest: Effort normal. No respiratory distress. He has no wheezes. He has no rales.   Neurological: He is alert and oriented to person, place, and time.   Skin: Skin is warm and dry.   Psychiatric: He has a normal mood and affect. His behavior is normal.   Nursing note and vitals reviewed.      Significant Labs:   BMP:   Recent Labs   Lab 12/06/19  0454   *      K 3.3*      CO2 25   BUN 11   CREATININE 1.0   CALCIUM 8.2*     CBC:   Recent Labs   Lab 12/05/19  0747 12/06/19  0454   WBC 8.10 6.60   HGB 13.5* 12.8*   HCT 41.5 39.0*    345       Significant  Imaging:       Assessment/Plan:      * Generalized weakness  Patient presents with generalized weakness and apparent aphasia.  Evaluated for CVA in ER, but then noted to have significant leukocytosis and lactic acidosis.  Workup with MRI negative for acute CVA and MRA with noted 50% stenosis of left ICA distal left M1 segment, but no occlusion, aneurysm or vascular malformation  Neurology consulted and final input pending  Unsure etiology of leukocytosis and lactic acidosis.    Slightly hypotensive on presentation and admitted to ICU.  No obvious source of infection  Possible hypovolemia a factor given improvement with IV fluids versus medication induced  Empirically started on broad spectrum antibiotics of cefepime and vancomycin  Patient does have hx of seizures, possible seizure causing current symptoms and contributing to leukocytosis.  Clinically improved but may need EEG  Blood and urine cultures no growth to date  Will continue IV fluids and continue to hold all anti hypertensive medications  Will deescalate antibiotics with stopping vancomycin today, but will continue cefepime for now  Consult placed to ID given increasing WBC count  Will follow up Neurology recommendations  Stable for transfer to the floor today  Consider stopping all antibiotics tomorrow if he remains afebrile and follow up on ID recommendations  Consult PT and OT    Will likely need SNF and wife favoring this. PPD and social work consult.  Wont happen until Mon    Debility  Consult PT/OT. Will need SNF. PPD and social work consult       Change in behavior  As discussed above    Epilepsy  As discussed above.  Continue levetiracetum    No further seizures.     Hyperlipidemia  Continue atorvastatin    BPH (benign prostatic hyperplasia)  Resume home medications.    ARF (acute renal failure)  Creatinine higher than baseline which is somewhere between 1.1 to 1.4  Unsure etiology, but likely pre renal  Renal function improving with IV hydration  alone  Avoid nephrotoxic medications.  Continue IV fluids   Repeat laboratory testing in a.m.    Resolved     Lactic acidosis  Unclear etiology  Possibly hypovolemia with resultant hypoperfusion versus infectious  Clinically improving      Leukocytosis  As discussed above  Will continue monitor    Will consult ID today    WBC count elevation has resolved. May have been from seizure. ID stopped Abx and signed off.     Essential hypertension  Permissive HTN while being ruled out for CVA.  However, blood pressure remains in the normal range and not hypertensive  Continue to hold all medications for now  Patient may need medication adjustment    History of CVA (cerebrovascular accident)  Chronic residual left sided weakness  Continue aspirin, Plavix and atorvastatin  As discussed above    Type 2 diabetes mellitus, without long-term current use of insulin  Hemoglobin A1c 7.6  Blood glucose fluctuated between 70 - 178  Continue sliding scale for now and diet advanced to diabetic after swallowing test passed    Obesity Body mass index is 31.88 kg/m².  Weight loss as out patient    Hypokalemia- replace   VTE Risk Mitigation (From admission, onward)         Ordered     enoxaparin injection 30 mg  Daily      12/02/19 1838     IP VTE LOW RISK PATIENT  Once      12/02/19 1837     Place NNAMDI hose  Until discontinued      12/02/19 1837                To SNF. Discussed with  this am. Will not happen until Monday       Elvis Rondon MD  Department of Hospital Medicine   [facility]

## 2019-12-06 NOTE — PLAN OF CARE
Problem: Physical Therapy Goal  Goal: Physical Therapy Goal  Description  Goals to be met by: 2019     Patient will increase functional independence with mobility by performin. Supine to sit with Modified [place]  2. Sit to stand transfer with Modified [place]  3. Gait  x  feet with Supervision using Franky-walker/Quad cane.   4. Lower extremity exercise program x10 reps per handout, with assistance as needed     Outcome: Ongoing, Progressing   Pt will benefit from further skilled therapy in order to return to PLOF.

## 2019-12-06 NOTE — NURSING
Pt remained free from fall/injury. Repositioned during shifts. Meds given per order w/out difficulty. Incont. Cont IVF. Accu checks. Safety measures maintained. Bed alarm set. Will cont to monitor

## 2019-12-06 NOTE — ASSESSMENT & PLAN NOTE
Patient presents with generalized weakness and apparent aphasia.  Evaluated for CVA in ER, but then noted to have significant leukocytosis and lactic acidosis.  Workup with MRI negative for acute CVA and MRA with noted 50% stenosis of left ICA distal left M1 segment, but no occlusion, aneurysm or vascular malformation  Neurology consulted and final input pending  Unsure etiology of leukocytosis and lactic acidosis.    Slightly hypotensive on presentation and admitted to ICU.  No obvious source of infection  Possible hypovolemia a factor given improvement with IV fluids versus medication induced  Empirically started on broad spectrum antibiotics of cefepime and vancomycin  Patient does have hx of seizures, possible seizure causing current symptoms and contributing to leukocytosis.  Clinically improved but may need EEG  Blood and urine cultures no growth to date  Will continue IV fluids and continue to hold all anti hypertensive medications  Will deescalate antibiotics with stopping vancomycin today, but will continue cefepime for now  Consult placed to ID given increasing WBC count  Will follow up Neurology recommendations  Stable for transfer to the floor today  Consider stopping all antibiotics tomorrow if he remains afebrile and follow up on ID recommendations  Consult PT and OT    Will likely need SNF and wife favoring this. PPD and social work consult.  Wont happen until Mon

## 2019-12-06 NOTE — PT/OT/SLP PROGRESS
Physical Therapy Treatment    Patient Name:  Richard M Pipkins   MRN:  [id number]    Recommendations:     Discharge Recommendations:  nursing facility, skilled   Discharge Equipment Recommendations: walker, david   Barriers to discharge: Decreased caregiver support    Assessment:     Richard M Pipkins is a 64 y.o. male admitted with a medical diagnosis of Generalized weakness.  He presents with the following impairments/functional limitations:  weakness, impaired endurance, impaired self care skills, gait instability, impaired balance, decreased upper extremity function, decreased lower extremity function, decreased ROM, impaired skin, decreased coordination, impaired sensation, impaired functional mobilty, decreased safety awareness, impaired coordination, impaired fine motor .Performed modified  5 sit to stand test from chair with B hand rails with min/MOD A. Pt completed test in 59 s , that indicated high fall risks .     Rehab Prognosis: Good; patient would benefit from acute skilled PT services to address these deficits and reach maximum level of function.    Recent Surgery: * No surgery found *      Plan:     During this hospitalization, patient to be seen 5 x/week to address the identified rehab impairments via gait training, therapeutic activities, therapeutic exercises and progress toward the following goals:    · Plan of Care Expires:  12/18/19    Subjective     Chief Complaint: weakness  Patient/Family Comments/goals: pt agreeable to treatment   Pain/Comfort:  · Pain Rating 1: 0/10  · Pain Rating Post-Intervention 1: 0/10      Objective:     Communicated with nurse prior to session.  Patient found HOB elevated with telemetry, bed alarm upon PT entry to room.     General Precautions: Standard, fall   Orthopedic Precautions:N/A   Braces: N/A     Functional Mobility:  · Bed Mobility:     · Rolling Right: stand by assistance  · Scooting: stand by assistance and contact guard assistance  · Supine to Sit: contact  guard assistance, HOB elevated, bedside rail   · Sit to Supine: contact guard assistance and minimum assistance  · Transfers:     · Sit to Stand:  minimum assistance with hemiwalker. V/T cues for safety technique and walker management.   · Bed to Chair: minimum assistance with  hemiwalker  using  Step Transfer  · Gait:  Patient ambulated ~ 30 ft x 2  on level tile with hemiwalker  with Minimal Assistance (chair followed) .  Pt with demonstarting a  3-point gait with decreased evelin, increased time in double stance, decreased velocity of limb motion, decreased step length, decreased swing-to-stance ratio, decreased toe-to-floor clearance and decreased weight-shifting ability.Impairments contributing to gait deviations include impaired balance, decreased flexibility, pain, impaired postural control, decreased ROM and decreased strength. V/T cues for safety technique and hemiwalker management.   · Balance:  Fair -      AM-PAC 6 CLICK MOBILITY  Turning over in bed (including adjusting bedclothes, sheets and blankets)?: 4  Sitting down on and standing up from a chair with arms (e.g., wheelchair, bedside commode, etc.): 2  Moving from lying on back to sitting on the side of the bed?: 3  Moving to and from a bed to a chair (including a wheelchair)?: 3  Need to walk in hospital room?: 3  Climbing 3-5 steps with a railing?: 2  Basic Mobility Total Score: 17       Therapeutic Activities and Exercises:   Pt performed seated BLE 10 reps x 2 trials:   AP, LAQ, HS,  Hip abd/add with pillow , Hip flexion. V/T's cues for technique and sequence. Pt tolerated well.   Performed modified  5 sit to stand test from chair with B hand rails with min/MOD A. Pt completed test in 59 s , that indicated high fall risks .   Educated pt on safety awareness with all OOB mobility , transfer and gait training.     Patient left HOB elevated with all lines intact, call button in reach, bed alarm on and nurse present..    GOALS:   Multidisciplinary  Problems     Physical Therapy Goals        Problem: Physical Therapy Goal    Goal Priority Disciplines Outcome Goal Variances Interventions   Physical Therapy Goal     PT, PT/OT Ongoing, Progressing     Description:  Goals to be met by: 2019     Patient will increase functional independence with mobility by performin. Supine to sit with Modified [place]  2. Sit to stand transfer with Modified [place]  3. Gait  x  feet with Supervision using Franky-walker/Quad cane.   4. Lower extremity exercise program x10 reps per handout, with assistance as needed                      Time Tracking:     PT Received On: 19  PT Start Time: 1436     PT Stop Time: 1502  PT Total Time (min): 26 min     Billable Minutes: Gait Training 14 and Therapeutic Exercise 12    Treatment Type: Treatment  PT/PTA: PTA     PTA Visit Number: 1     Elvie Rosas PTA  2019

## 2019-12-06 NOTE — PLAN OF CARE
CRYSTAL met with patient and wife to discuss SNF options. CRYSTAL informed wife that her top facilities were declined. CRYSTAL informed wife that referrals were sent to facilities in network and patient was accepted by [facility]. Wife stated that patient can go there. CRYSTAL contacted Bradley at [facility] and informed that patient's family accepted. SW also informed that patient will be discharging on Monday. Bradley stated she will call wife to schedule appointment. CRYSTAL informed Bradley that patient will be discharging Monday.         12/06/19 1200   Post-Acute Status   Post-Acute Authorization Placement  (SNF - [facility])   Post-Acute Placement Status Pending Payor Review   Patient choice form signed by patient/caregiver List with quality metrics by geographic area provided   Discharge Delays None known at this time

## 2019-12-06 NOTE — SUBJECTIVE & OBJECTIVE
Interval History: No new issues     Review of Systems   Unable to perform ROS: Dementia   Constitutional: Negative for activity change.   Eyes: Negative for discharge.   Respiratory: Negative for chest tightness and shortness of breath.    Cardiovascular: Negative for chest pain.   Gastrointestinal: Negative for abdominal pain.   Genitourinary: Negative for difficulty urinating.   Musculoskeletal: Negative for arthralgias.   Psychiatric/Behavioral: Negative for agitation.     Objective:     Vital Signs (Most Recent):  Temp: 98.2 °F (36.8 °C) (12/06/19 0723)  Pulse: 82 (12/06/19 0723)  Resp: 16 (12/06/19 0723)  BP: (!) 155/94 (12/06/19 0723)  SpO2: 99 % (12/06/19 0723) Vital Signs (24h Range):  Temp:  [97.9 °F (36.6 °C)-98.7 °F (37.1 °C)] 98.2 °F (36.8 °C)  Pulse:  [63-82] 82  Resp:  [16-18] 16  SpO2:  [94 %-99 %] 99 %  BP: (117-169)/(63-94) 155/94     Weight: 86.9 kg (191 lb 9.3 oz)  Body mass index is 31.88 kg/m².    Intake/Output Summary (Last 24 hours) at 12/6/2019 1005  Last data filed at 12/6/2019 0900  Gross per 24 hour   Intake 2037 ml   Output 1 ml   Net 2036 ml      Physical Exam   Constitutional: He is oriented to person, place, and time. He appears well-developed and well-nourished.   HENT:   Head: Normocephalic and atraumatic.   Cardiovascular: Normal rate and regular rhythm.   Pulmonary/Chest: Effort normal. No respiratory distress. He has no wheezes. He has no rales.   Neurological: He is alert and oriented to person, place, and time.   Skin: Skin is warm and dry.   Psychiatric: He has a normal mood and affect. His behavior is normal.   Nursing note and vitals reviewed.      Significant Labs:   BMP:   Recent Labs   Lab 12/06/19 0454   *      K 3.3*      CO2 25   BUN 11   CREATININE 1.0   CALCIUM 8.2*     CBC:   Recent Labs   Lab 12/05/19  0747 12/06/19  0454   WBC 8.10 6.60   HGB 13.5* 12.8*   HCT 41.5 39.0*    345       Significant Imaging:

## 2019-12-06 NOTE — CONSULTS
CRYSTAL sent additional referrals to [facility], [facility] and [facility] which are in network with insurance. SW was unable to speak to patient's wife yesterday about her top facilities that declined and list received from insurance company. SW will speak to patient today and try to reach out to wife again to discuss discharge planning.    Patient received 142  yesterday.

## 2019-12-07 LAB
BACTERIA BLD CULT: NORMAL
BACTERIA BLD CULT: NORMAL
POCT GLUCOSE: 111 MG/DL (ref 70–110)
POCT GLUCOSE: 115 MG/DL (ref 70–110)
POCT GLUCOSE: 141 MG/DL (ref 70–110)
POCT GLUCOSE: 241 MG/DL (ref 70–110)
POTASSIUM SERPL-SCNC: 3.6 MMOL/L (ref 3.5–5.1)

## 2019-12-07 PROCEDURE — 84132 ASSAY OF SERUM POTASSIUM: CPT

## 2019-12-07 PROCEDURE — 36415 COLL VENOUS BLD VENIPUNCTURE: CPT

## 2019-12-07 PROCEDURE — 25000003 PHARM REV CODE 250: Performed by: HOSPITALIST

## 2019-12-07 PROCEDURE — 25000003 PHARM REV CODE 250: Performed by: PSYCHIATRY & NEUROLOGY

## 2019-12-07 PROCEDURE — 11000001 HC ACUTE MED/SURG PRIVATE ROOM

## 2019-12-07 PROCEDURE — 25000003 PHARM REV CODE 250: Performed by: INTERNAL MEDICINE

## 2019-12-07 PROCEDURE — 63600175 PHARM REV CODE 636 W HCPCS: Performed by: HOSPITALIST

## 2019-12-07 RX ORDER — HYDRALAZINE HYDROCHLORIDE 25 MG/1
75 TABLET, FILM COATED ORAL EVERY 8 HOURS
Status: DISCONTINUED | OUTPATIENT
Start: 2019-12-07 | End: 2019-12-09 | Stop reason: HOSPADM

## 2019-12-07 RX ORDER — AMLODIPINE BESYLATE 5 MG/1
10 TABLET ORAL DAILY
Status: DISCONTINUED | OUTPATIENT
Start: 2019-12-08 | End: 2019-12-09 | Stop reason: HOSPADM

## 2019-12-07 RX ADMIN — OXYBUTYNIN CHLORIDE 5 MG: 5 TABLET ORAL at 02:12

## 2019-12-07 RX ADMIN — ATORVASTATIN CALCIUM 80 MG: 40 TABLET, FILM COATED ORAL at 10:12

## 2019-12-07 RX ADMIN — HYDRALAZINE HYDROCHLORIDE 75 MG: 25 TABLET, FILM COATED ORAL at 10:12

## 2019-12-07 RX ADMIN — OXYBUTYNIN CHLORIDE 5 MG: 5 TABLET ORAL at 09:12

## 2019-12-07 RX ADMIN — ENOXAPARIN SODIUM 30 MG: 100 INJECTION SUBCUTANEOUS at 05:12

## 2019-12-07 RX ADMIN — OXYBUTYNIN CHLORIDE 5 MG: 5 TABLET ORAL at 10:12

## 2019-12-07 RX ADMIN — LEVETIRACETAM 750 MG: 500 SOLUTION ORAL at 10:12

## 2019-12-07 RX ADMIN — HYDRALAZINE HYDROCHLORIDE 50 MG: 25 TABLET, FILM COATED ORAL at 05:12

## 2019-12-07 RX ADMIN — HYDRALAZINE HYDROCHLORIDE 75 MG: 25 TABLET, FILM COATED ORAL at 02:12

## 2019-12-07 RX ADMIN — ASPIRIN 81 MG 81 MG: 81 TABLET ORAL at 09:12

## 2019-12-07 RX ADMIN — TAMSULOSIN HYDROCHLORIDE 0.4 MG: 0.4 CAPSULE ORAL at 09:12

## 2019-12-07 RX ADMIN — METOPROLOL TARTRATE 12.5 MG: 25 TABLET, FILM COATED ORAL at 10:12

## 2019-12-07 NOTE — SUBJECTIVE & OBJECTIVE
Interval History: No new issues     Review of Systems   Unable to perform ROS: Dementia   Constitutional: Negative for activity change.   Eyes: Negative for discharge.   Respiratory: Negative for chest tightness and shortness of breath.    Cardiovascular: Negative for chest pain.   Gastrointestinal: Negative for abdominal pain.   Genitourinary: Negative for difficulty urinating.   Musculoskeletal: Negative for arthralgias.   Psychiatric/Behavioral: Negative for agitation.     Objective:     Vital Signs (Most Recent):  Temp: 97.3 °F (36.3 °C) (12/07/19 0734)  Pulse: 83 (12/07/19 0734)  Resp: 18 (12/07/19 0734)  BP: (!) 171/79 (12/07/19 0734)  SpO2: 99 % (12/07/19 0734) Vital Signs (24h Range):  Temp:  [97.3 °F (36.3 °C)-99.1 °F (37.3 °C)] 97.3 °F (36.3 °C)  Pulse:  [71-87] 83  Resp:  [15-18] 18  SpO2:  [96 %-99 %] 99 %  BP: (135-171)/(62-96) 171/79     Weight: 86.9 kg (191 lb 9.3 oz)  Body mass index is 31.88 kg/m².    Intake/Output Summary (Last 24 hours) at 12/7/2019 0946  Last data filed at 12/7/2019 0900  Gross per 24 hour   Intake 360 ml   Output --   Net 360 ml      Physical Exam    Significant Labs:   BMP:   Recent Labs   Lab 12/06/19 0454 12/07/19 0451   *  --      --    K 3.3* 3.6     --    CO2 25  --    BUN 11  --    CREATININE 1.0  --    CALCIUM 8.2*  --      CBC:   Recent Labs   Lab 12/06/19 0454   WBC 6.60   HGB 12.8*   HCT 39.0*          Significant Imaging:

## 2019-12-07 NOTE — NURSING
Remained free from fall/injury. No complain of pain. Weight shift assistance provided. Bedside Report given to night nurse. Walking rounds completed. Visualized and assessed patient NAD noted. Safety precautions maintained and call light within reach.    Chart check completed.

## 2019-12-07 NOTE — PROGRESS NOTES
[facility] Hospital Medicine  Progress Note    Patient Name: Richard M Pipkins  MRN: [id number]  Patient Class: IP- Inpatient   Admission Date: 12/2/2019  Length of Stay: 5 days  Attending Physician: Elvis Weeks MD  Primary Care Provider: Annalee Campos MD        Subjective:     Principal Problem:Generalized weakness        HPI:  65 y/o male with hx of CVA with left sided weakness presents to the ER via EMS for an evaluation of generalized weakness since this morning.  Patient could not walk on his own this morning and was not talking.  Patient's most recent known normal was 7pm last night when he went to bed.  He has chronic bilateral L>R weakness and chronic incontinence but has not had incontinence today. Patient's wife was concerned because he has not been mobile or talking today.  This is different from his baseline.  Patient was evaluated for possible stroke in ER.  Labs then showed significant leukocytosis and lactic acidosis.  Patient's wife denies any fevers, chills, cough, shortness of breath or any other complaints.    Overview/Hospital Course:  Mr. Pipkins presented with generalized weakness and changes in activity level.  Initial head CT unremarkable.  He was also noted to have leukocytosis, hypotension and lactic acidosis with no obvious source of infection.  Blood and urine cultures obtained.  Empiric antibiotics of cefepime and vancomycin initiated.  Neurology consulted and started on anti-seizure meds for possible seizures. The patient had a sig. WBC elevation with negative urine and blood cultures.  Prophylactic Abx's were started in the ICU. Patient's hypotension resolved and the patient was transferred out of the ICU on 12/3.  ID was consulted on 12/4. PT/OT were consulted and recommended SNF. ID noted that the leukocytosis may have been reactive to possible seizure and given all negative work up- stopped all Abx. Leukocytosis resolved. ID consulted and  recommended to stop Abx and monitor.     Interval History: No new issues     Review of Systems   Unable to perform ROS: Dementia   Constitutional: Negative for activity change.   Eyes: Negative for discharge.   Respiratory: Negative for chest tightness and shortness of breath.    Cardiovascular: Negative for chest pain.   Gastrointestinal: Negative for abdominal pain.   Genitourinary: Negative for difficulty urinating.   Musculoskeletal: Negative for arthralgias.   Psychiatric/Behavioral: Negative for agitation.     Objective:     Vital Signs (Most Recent):  Temp: 97.3 °F (36.3 °C) (12/07/19 0734)  Pulse: 83 (12/07/19 0734)  Resp: 18 (12/07/19 0734)  BP: (!) 171/79 (12/07/19 0734)  SpO2: 99 % (12/07/19 0734) Vital Signs (24h Range):  Temp:  [97.3 °F (36.3 °C)-99.1 °F (37.3 °C)] 97.3 °F (36.3 °C)  Pulse:  [71-87] 83  Resp:  [15-18] 18  SpO2:  [96 %-99 %] 99 %  BP: (135-171)/(62-96) 171/79     Weight: 86.9 kg (191 lb 9.3 oz)  Body mass index is 31.88 kg/m².    Intake/Output Summary (Last 24 hours) at 12/7/2019 0946  Last data filed at 12/7/2019 0900  Gross per 24 hour   Intake 360 ml   Output --   Net 360 ml      Physical Exam    Significant Labs:   BMP:   Recent Labs   Lab 12/06/19 0454 12/07/19 0451   *  --      --    K 3.3* 3.6     --    CO2 25  --    BUN 11  --    CREATININE 1.0  --    CALCIUM 8.2*  --      CBC:   Recent Labs   Lab 12/06/19 0454   WBC 6.60   HGB 12.8*   HCT 39.0*          Significant Imaging:      Assessment/Plan:      * Generalized weakness  Patient presents with generalized weakness and apparent aphasia.  Evaluated for CVA in ER, but then noted to have significant leukocytosis and lactic acidosis.  Workup with MRI negative for acute CVA and MRA with noted 50% stenosis of left ICA distal left M1 segment, but no occlusion, aneurysm or vascular malformation  Neurology consulted and final input pending  Unsure etiology of leukocytosis and lactic acidosis.    Slightly  hypotensive on presentation and admitted to ICU.  No obvious source of infection  Possible hypovolemia a factor given improvement with IV fluids versus medication induced  Empirically started on broad spectrum antibiotics of cefepime and vancomycin  Patient does have hx of seizures, possible seizure causing current symptoms and contributing to leukocytosis.  Clinically improved but may need EEG  Blood and urine cultures no growth to date  Will continue IV fluids and continue to hold all anti hypertensive medications  Will deescalate antibiotics with stopping vancomycin today, but will continue cefepime for now  Consult placed to ID given increasing WBC count  Will follow up Neurology recommendations  Stable for transfer to the floor today  Consider stopping all antibiotics tomorrow if he remains afebrile and follow up on ID recommendations  Consult PT and OT    Will likely need SNF and wife favoring this. PPD and social work consult.  Wont happen until Mon    Debility  Consult PT/OT. Will need SNF. PPD and social work consult       Change in behavior  As discussed above    Epilepsy  As discussed above.  Continue levetiracetum    No further seizures.     Hyperlipidemia  Continue atorvastatin    BPH (benign prostatic hyperplasia)  Resume home medications.    ARF (acute renal failure)  Creatinine higher than baseline which is somewhere between 1.1 to 1.4  Unsure etiology, but likely pre renal  Renal function improving with IV hydration alone  Avoid nephrotoxic medications.  Continue IV fluids   Repeat laboratory testing in a.m.    Resolved     Lactic acidosis  Unclear etiology  Possibly hypovolemia with resultant hypoperfusion versus infectious  Clinically improving      Leukocytosis  As discussed above  Will continue monitor    Will consult ID today    WBC count elevation has resolved. May have been from seizure. ID stopped Abx and signed off.     Essential hypertension  Permissive HTN while being ruled out for  CVA.  However, blood pressure remains in the normal range and not hypertensive  Continue to hold all medications for now  Patient may need medication adjustment    History of CVA (cerebrovascular accident)  Chronic residual left sided weakness  Continue aspirin, Plavix and atorvastatin  As discussed above    Type 2 diabetes mellitus, without long-term current use of insulin  Hemoglobin A1c 7.6  Blood glucose fluctuated between 70 - 178  Continue sliding scale for now and diet advanced to diabetic after swallowing test passed    Obesity Body mass index is 31.88 kg/m².  Weight loss as out patient       VTE Risk Mitigation (From admission, onward)         Ordered     enoxaparin injection 30 mg  Daily      12/02/19 1838     IP VTE LOW RISK PATIENT  Once      12/02/19 1837     Place NNAMDI hose  Until discontinued      12/02/19 1837                Likely to SNF on Monday       Elvis Rondon MD  Department of Hospital Medicine   [facility]

## 2019-12-08 LAB
POCT GLUCOSE: 126 MG/DL (ref 70–110)
POCT GLUCOSE: 129 MG/DL (ref 70–110)
POCT GLUCOSE: 132 MG/DL (ref 70–110)
POCT GLUCOSE: 155 MG/DL (ref 70–110)
POCT GLUCOSE: 164 MG/DL (ref 70–110)

## 2019-12-08 PROCEDURE — 63600175 PHARM REV CODE 636 W HCPCS: Performed by: HOSPITALIST

## 2019-12-08 PROCEDURE — 25000003 PHARM REV CODE 250: Performed by: HOSPITALIST

## 2019-12-08 PROCEDURE — 25000003 PHARM REV CODE 250: Performed by: INTERNAL MEDICINE

## 2019-12-08 PROCEDURE — 11000001 HC ACUTE MED/SURG PRIVATE ROOM

## 2019-12-08 PROCEDURE — 25000003 PHARM REV CODE 250: Performed by: PSYCHIATRY & NEUROLOGY

## 2019-12-08 RX ADMIN — ATORVASTATIN CALCIUM 80 MG: 40 TABLET, FILM COATED ORAL at 09:12

## 2019-12-08 RX ADMIN — AMLODIPINE BESYLATE 10 MG: 5 TABLET ORAL at 09:12

## 2019-12-08 RX ADMIN — LEVETIRACETAM 750 MG: 500 SOLUTION ORAL at 09:12

## 2019-12-08 RX ADMIN — OXYBUTYNIN CHLORIDE 5 MG: 5 TABLET ORAL at 09:12

## 2019-12-08 RX ADMIN — HYDRALAZINE HYDROCHLORIDE 75 MG: 25 TABLET, FILM COATED ORAL at 06:12

## 2019-12-08 RX ADMIN — ASPIRIN 81 MG 81 MG: 81 TABLET ORAL at 09:12

## 2019-12-08 RX ADMIN — OXYBUTYNIN CHLORIDE 5 MG: 5 TABLET ORAL at 03:12

## 2019-12-08 RX ADMIN — TAMSULOSIN HYDROCHLORIDE 0.4 MG: 0.4 CAPSULE ORAL at 09:12

## 2019-12-08 RX ADMIN — METOPROLOL TARTRATE 12.5 MG: 25 TABLET, FILM COATED ORAL at 09:12

## 2019-12-08 RX ADMIN — HYDRALAZINE HYDROCHLORIDE 75 MG: 25 TABLET, FILM COATED ORAL at 09:12

## 2019-12-08 RX ADMIN — ENOXAPARIN SODIUM 30 MG: 100 INJECTION SUBCUTANEOUS at 04:12

## 2019-12-08 RX ADMIN — HYDRALAZINE HYDROCHLORIDE 75 MG: 25 TABLET, FILM COATED ORAL at 03:12

## 2019-12-08 NOTE — PROGRESS NOTES
[facility] Hospital Medicine  Progress Note    Patient Name: Richard M Pipkins  MRN: [id number]  Patient Class: IP- Inpatient   Admission Date: 12/2/2019  Length of Stay: 6 days  Attending Physician: Elvis Weeks MD  Primary Care Provider: Annalee Campos MD        Subjective:     Principal Problem:Generalized weakness        HPI:  65 y/o male with hx of CVA with left sided weakness presents to the ER via EMS for an evaluation of generalized weakness since this morning.  Patient could not walk on his own this morning and was not talking.  Patient's most recent known normal was 7pm last night when he went to bed.  He has chronic bilateral L>R weakness and chronic incontinence but has not had incontinence today. Patient's wife was concerned because he has not been mobile or talking today.  This is different from his baseline.  Patient was evaluated for possible stroke in ER.  Labs then showed significant leukocytosis and lactic acidosis.  Patient's wife denies any fevers, chills, cough, shortness of breath or any other complaints.    Overview/Hospital Course:  Mr. Pipkins presented with generalized weakness and changes in activity level.  Initial head CT unremarkable.  He was also noted to have leukocytosis, hypotension and lactic acidosis with no obvious source of infection.  Blood and urine cultures obtained.  Empiric antibiotics of cefepime and vancomycin initiated.  Neurology consulted and started on anti-seizure meds for possible seizures. The patient had a sig. WBC elevation with negative urine and blood cultures.  Prophylactic Abx's were started in the ICU. Patient's hypotension resolved and the patient was transferred out of the ICU on 12/3.  ID was consulted on 12/4. PT/OT were consulted and recommended SNF. ID noted that the leukocytosis may have been reactive to possible seizure and given all negative work up- stopped all Abx. Leukocytosis resolved. ID consulted and  recommended to stop Abx and monitor.     Interval History: no new complaints    Review of Systems   Unable to perform ROS: Dementia   Constitutional: Negative for activity change.   Eyes: Negative for discharge.   Respiratory: Negative for chest tightness and shortness of breath.    Cardiovascular: Negative for chest pain.   Gastrointestinal: Negative for abdominal pain.   Genitourinary: Negative for difficulty urinating.   Musculoskeletal: Negative for arthralgias.   Psychiatric/Behavioral: Negative for agitation.     Objective:     Vital Signs (Most Recent):  Temp: 97.9 °F (36.6 °C) (12/08/19 0728)  Pulse: 76 (12/08/19 0728)  Resp: 18 (12/08/19 0728)  BP: (!) 144/84 (12/08/19 0728)  SpO2: 98 % (12/08/19 0728) Vital Signs (24h Range):  Temp:  [97.6 °F (36.4 °C)-98.9 °F (37.2 °C)] 97.9 °F (36.6 °C)  Pulse:  [69-79] 76  Resp:  [18] 18  SpO2:  [97 %-100 %] 98 %  BP: (138-175)/(60-85) 144/84     Weight: 87.4 kg (192 lb 10.9 oz)  Body mass index is 32.06 kg/m².    Intake/Output Summary (Last 24 hours) at 12/8/2019 1006  Last data filed at 12/8/2019 0800  Gross per 24 hour   Intake 624 ml   Output --   Net 624 ml      Physical Exam   Constitutional: He is oriented to person, place, and time. He appears well-developed and well-nourished.   HENT:   Head: Normocephalic and atraumatic.   Neurological: He is alert and oriented to person, place, and time.   Skin: Skin is warm and dry.   Psychiatric: He has a normal mood and affect.   Nursing note and vitals reviewed.      Significant Labs:   BMP:   Recent Labs   Lab 12/07/19  0451   K 3.6     CBC: No results for input(s): WBC, HGB, HCT, PLT in the last 48 hours.    Significant Imaging:       Assessment/Plan:      * Generalized weakness  Patient presents with generalized weakness and apparent aphasia.  Evaluated for CVA in ER, but then noted to have significant leukocytosis and lactic acidosis.  Workup with MRI negative for acute CVA and MRA with noted 50% stenosis of left ICA  distal left M1 segment, but no occlusion, aneurysm or vascular malformation  Neurology consulted and final input pending  Unsure etiology of leukocytosis and lactic acidosis.    Slightly hypotensive on presentation and admitted to ICU.  No obvious source of infection  Possible hypovolemia a factor given improvement with IV fluids versus medication induced  Empirically started on broad spectrum antibiotics of cefepime and vancomycin  Patient does have hx of seizures, possible seizure causing current symptoms and contributing to leukocytosis.  Clinically improved but may need EEG  Blood and urine cultures no growth to date  Will continue IV fluids and continue to hold all anti hypertensive medications  Will deescalate antibiotics with stopping vancomycin today, but will continue cefepime for now  Consult placed to ID given increasing WBC count  Will follow up Neurology recommendations  Stable for transfer to the floor today  Consider stopping all antibiotics tomorrow if he remains afebrile and follow up on ID recommendations  Consult PT and OT    Will likely need SNF and wife favoring this. PPD and social work consult.  Wont happen until Mon    Debility  Consult PT/OT. Will need SNF. PPD and social work consult       Change in behavior  As discussed above    Epilepsy  As discussed above.  Continue levetiracetum    No further seizures.     Hyperlipidemia  Continue atorvastatin    BPH (benign prostatic hyperplasia)  Resume home medications.    ARF (acute renal failure)  Creatinine higher than baseline which is somewhere between 1.1 to 1.4  Unsure etiology, but likely pre renal  Renal function improving with IV hydration alone  Avoid nephrotoxic medications.  Continue IV fluids   Repeat laboratory testing in a.m.    Resolved     Lactic acidosis  Unclear etiology  Possibly hypovolemia with resultant hypoperfusion versus infectious  Clinically improving      Leukocytosis  As discussed above  Will continue monitor    Will  consult ID today    WBC count elevation has resolved. May have been from seizure. ID stopped Abx and signed off.     Essential hypertension  Permissive HTN while being ruled out for CVA.  However, blood pressure remains in the normal range and not hypertensive  Continue to hold all medications for now  Patient may need medication adjustment    History of CVA (cerebrovascular accident)  Chronic residual left sided weakness  Continue aspirin, Plavix and atorvastatin  As discussed above    Type 2 diabetes mellitus, without long-term current use of insulin  Hemoglobin A1c 7.6  Blood glucose fluctuated between 70 - 178  Continue sliding scale for now and diet advanced to diabetic after swallowing test passed      VTE Risk Mitigation (From admission, onward)         Ordered     enoxaparin injection 30 mg  Daily      12/02/19 1838     IP VTE LOW RISK PATIENT  Once      12/02/19 1837     Place NNAMDI hose  Until discontinued      12/02/19 1837              To SNF. Likely on Monday           Elvis Rondon MD  Department of Hospital Medicine   [facility]

## 2019-12-08 NOTE — SUBJECTIVE & OBJECTIVE
Interval History: no new complaints    Review of Systems   Unable to perform ROS: Dementia   Constitutional: Negative for activity change.   Eyes: Negative for discharge.   Respiratory: Negative for chest tightness and shortness of breath.    Cardiovascular: Negative for chest pain.   Gastrointestinal: Negative for abdominal pain.   Genitourinary: Negative for difficulty urinating.   Musculoskeletal: Negative for arthralgias.   Psychiatric/Behavioral: Negative for agitation.     Objective:     Vital Signs (Most Recent):  Temp: 97.9 °F (36.6 °C) (12/08/19 0728)  Pulse: 76 (12/08/19 0728)  Resp: 18 (12/08/19 0728)  BP: (!) 144/84 (12/08/19 0728)  SpO2: 98 % (12/08/19 0728) Vital Signs (24h Range):  Temp:  [97.6 °F (36.4 °C)-98.9 °F (37.2 °C)] 97.9 °F (36.6 °C)  Pulse:  [69-79] 76  Resp:  [18] 18  SpO2:  [97 %-100 %] 98 %  BP: (138-175)/(60-85) 144/84     Weight: 87.4 kg (192 lb 10.9 oz)  Body mass index is 32.06 kg/m².    Intake/Output Summary (Last 24 hours) at 12/8/2019 1006  Last data filed at 12/8/2019 0800  Gross per 24 hour   Intake 624 ml   Output --   Net 624 ml      Physical Exam   Constitutional: He is oriented to person, place, and time. He appears well-developed and well-nourished.   HENT:   Head: Normocephalic and atraumatic.   Neurological: He is alert and oriented to person, place, and time.   Skin: Skin is warm and dry.   Psychiatric: He has a normal mood and affect.   Nursing note and vitals reviewed.      Significant Labs:   BMP:   Recent Labs   Lab 12/07/19  0451   K 3.6     CBC: No results for input(s): WBC, HGB, HCT, PLT in the last 48 hours.    Significant Imaging:

## 2019-12-09 VITALS
OXYGEN SATURATION: 97 % | HEIGHT: 65 IN | WEIGHT: 192.69 LBS | HEART RATE: 69 BPM | DIASTOLIC BLOOD PRESSURE: 61 MMHG | SYSTOLIC BLOOD PRESSURE: 118 MMHG | RESPIRATION RATE: 18 BRPM | BODY MASS INDEX: 32.1 KG/M2 | TEMPERATURE: 98 F

## 2019-12-09 PROBLEM — R46.89 CHANGE IN BEHAVIOR: Status: RESOLVED | Noted: 2019-12-03 | Resolved: 2019-12-09

## 2019-12-09 PROBLEM — D72.829 LEUKOCYTOSIS: Status: RESOLVED | Noted: 2019-12-02 | Resolved: 2019-12-09

## 2019-12-09 PROBLEM — E87.20 LACTIC ACIDOSIS: Status: RESOLVED | Noted: 2019-12-02 | Resolved: 2019-12-09

## 2019-12-09 PROBLEM — N17.9 ARF (ACUTE RENAL FAILURE): Status: RESOLVED | Noted: 2019-12-02 | Resolved: 2019-12-09

## 2019-12-09 LAB
POCT GLUCOSE: 149 MG/DL (ref 70–110)
POCT GLUCOSE: 154 MG/DL (ref 70–110)
TROPONIN I SERPL DL<=0.01 NG/ML-MCNC: 0.01 NG/ML (ref 0–0.03)

## 2019-12-09 PROCEDURE — 93010 EKG 12-LEAD: ICD-10-PCS | Mod: ,,, | Performed by: INTERNAL MEDICINE

## 2019-12-09 PROCEDURE — 93010 ELECTROCARDIOGRAM REPORT: CPT | Mod: ,,, | Performed by: INTERNAL MEDICINE

## 2019-12-09 PROCEDURE — 94761 N-INVAS EAR/PLS OXIMETRY MLT: CPT

## 2019-12-09 PROCEDURE — 36415 COLL VENOUS BLD VENIPUNCTURE: CPT

## 2019-12-09 PROCEDURE — 25000003 PHARM REV CODE 250: Performed by: HOSPITALIST

## 2019-12-09 PROCEDURE — 25000003 PHARM REV CODE 250: Performed by: PSYCHIATRY & NEUROLOGY

## 2019-12-09 PROCEDURE — 84484 ASSAY OF TROPONIN QUANT: CPT

## 2019-12-09 PROCEDURE — 25000003 PHARM REV CODE 250: Performed by: INTERNAL MEDICINE

## 2019-12-09 PROCEDURE — 93005 ELECTROCARDIOGRAM TRACING: CPT

## 2019-12-09 RX ORDER — HYDRALAZINE HYDROCHLORIDE 25 MG/1
75 TABLET, FILM COATED ORAL EVERY 8 HOURS
Qty: 270 TABLET | Refills: 11 | Status: SHIPPED | OUTPATIENT
Start: 2019-12-09 | End: 2020-12-08

## 2019-12-09 RX ORDER — LEVETIRACETAM 100 MG/ML
750 SOLUTION ORAL 2 TIMES DAILY
Qty: 450 ML | Refills: 11 | Status: SHIPPED | OUTPATIENT
Start: 2019-12-09 | End: 2020-12-08

## 2019-12-09 RX ORDER — METOPROLOL TARTRATE 25 MG/1
12.5 TABLET, FILM COATED ORAL DAILY
Qty: 15 TABLET | Refills: 11 | Status: SHIPPED | OUTPATIENT
Start: 2019-12-10 | End: 2020-12-09

## 2019-12-09 RX ADMIN — OXYBUTYNIN CHLORIDE 5 MG: 5 TABLET ORAL at 09:12

## 2019-12-09 RX ADMIN — ASPIRIN 81 MG 81 MG: 81 TABLET ORAL at 09:12

## 2019-12-09 RX ADMIN — HYDRALAZINE HYDROCHLORIDE 75 MG: 25 TABLET, FILM COATED ORAL at 02:12

## 2019-12-09 RX ADMIN — METOPROLOL TARTRATE 12.5 MG: 25 TABLET, FILM COATED ORAL at 09:12

## 2019-12-09 RX ADMIN — ACETAMINOPHEN 650 MG: 325 TABLET ORAL at 09:12

## 2019-12-09 RX ADMIN — LEVETIRACETAM 750 MG: 500 SOLUTION ORAL at 09:12

## 2019-12-09 RX ADMIN — TAMSULOSIN HYDROCHLORIDE 0.4 MG: 0.4 CAPSULE ORAL at 09:12

## 2019-12-09 RX ADMIN — AMLODIPINE BESYLATE 10 MG: 5 TABLET ORAL at 09:12

## 2019-12-09 RX ADMIN — OXYBUTYNIN CHLORIDE 5 MG: 5 TABLET ORAL at 02:12

## 2019-12-09 RX ADMIN — HYDRALAZINE HYDROCHLORIDE 75 MG: 25 TABLET, FILM COATED ORAL at 05:12

## 2019-12-09 NOTE — DISCHARGE SUMMARY
[facility]  Discharge Summary      Patient Name: Richard M Pipkins  MRN: [id number]  Admission Date: 12/2/2019  Hospital Length of Stay: 7 days  Discharge Date and Time:  12/09/2019 10:48 AM  Attending Physician: Elvis Weeks MD   Discharging Provider: Elvis Weeks MD  Primary Care Provider: Annalee Campos MD      HPI:   63 y/o male with hx of CVA with left sided weakness presents to the ER via EMS for an evaluation of generalized weakness since this morning.  Patient could not walk on his own this morning and was not talking.  Patient's most recent known normal was 7pm last night when he went to bed.  He has chronic bilateral L>R weakness and chronic incontinence but has not had incontinence today. Patient's wife was concerned because he has not been mobile or talking today.  This is different from his baseline.  Patient was evaluated for possible stroke in ER.  Labs then showed significant leukocytosis and lactic acidosis.  Patient's wife denies any fevers, chills, cough, shortness of breath or any other complaints.    * No surgery found *      Hospital Course:   Mr. Pipkins presented with generalized weakness and changes in activity level.  Initial head CT unremarkable.  He was also noted to have leukocytosis, hypotension and lactic acidosis with no obvious source of infection.  Blood and urine cultures obtained.  Empiric antibiotics of cefepime and vancomycin initiated.  Neurology consulted and started on anti-seizure meds for possible seizures. The patient had a sig. WBC elevation with negative urine and blood cultures.  Prophylactic Abx's were started in the ICU. Patient's hypotension resolved and the patient was transferred out of the ICU on 12/3.  ID was consulted on 12/4. PT/OT were consulted and recommended SNF. ID noted that the leukocytosis may have been reactive to possible seizure and given all negative work up- stopped all Abx. Leukocytosis  resolved. ID consulted and recommended to stop Abx and monitor. The patient will be going to [facility] today. Activity as tolerated. Diet- soft/diabetic 2000 malissa.Follow up with PCP in one week      Consults:   Consults (From admission, onward)        Status Ordering Provider     Inpatient consult to Infectious Diseases  Once     Provider:  Zelalem Mejia MD    Completed GREGORY PHILLIPS UShellie     Inpatient consult to Infectious Diseases  Once     Provider:  SURAJ Huff    Completed ZEESHAN LIMA     Inpatient consult to Neurology  Once     Provider:  Trae Chapman MD    Completed GREGORY PHILLIPS     Inpatient consult to Social Work  Once     Provider:  (Not yet assigned)    Completed ZEESHAN LIMA     Inpatient consult to Telemedicine-Stroke  Once     Provider:  Alan Ramos MD    Acknowledged GREGORY PHILLIPS     IP consult to case management  Once     Provider:  (Not yet assigned)    Completed GREGORY PHILLIPS.          No new Assessment & Plan notes have been filed under this hospital service since the last note was generated.  Service: Hospital Medicine    Final Active Diagnoses:    Diagnosis Date Noted POA    PRINCIPAL PROBLEM:  Generalized weakness [R53.1] 12/02/2019 Yes    Debility [R53.81] 12/04/2019 Yes    BPH (benign prostatic hyperplasia) [N40.0] 12/02/2019 Yes     Chronic    Hyperlipidemia [E78.5] 12/02/2019 Yes     Chronic    Epilepsy [G40.909] 12/02/2019 Yes     Chronic    Essential hypertension [I10] 03/11/2019 Yes    Type 2 diabetes mellitus, without long-term current use of insulin [E11.9] 03/09/2019 Yes    History of CVA (cerebrovascular accident) [Z86.73] 03/09/2019 Not Applicable      Problems Resolved During this Admission:    Diagnosis Date Noted Date Resolved POA    Change in behavior [R46.89] 12/03/2019 12/09/2019 Yes    Change in behavior [R46.89] 12/02/2019 12/02/2019 Yes    Leukocytosis [D72.829] 12/02/2019 12/09/2019 Yes    Lactic acidosis [E87.2] 12/02/2019  12/09/2019 Yes    ARF (acute renal failure) [N17.9] 12/02/2019 12/09/2019 Yes    Altered mental state [R41.82] 12/02/2019 12/02/2019 Yes       Discharged Condition: good    Disposition: [facility]    Follow Up: weakness/seizures     Follow-up Information     Annalee Campos MD In 1 week.    Specialty:  General Practice  Contact information:  [address] [facility]  [address]  [place] [address] [phone]                 Patient Instructions:   No discharge procedures on file.    Significant Diagnostic Studies    Pending Diagnostic Studies:     Procedure Component Value Units Date/Time    EKG 12-lead [[id number]]     Order Status:  Sent Lab Status:  No result     Troponin I [[id number]] Collected:  12/09/19 0955    Order Status:  Sent Lab Status:  In process Updated:  12/09/19 1015    Specimen:  Blood          Medications:  Reconciled Home Medications:      Medication List      START taking these medications    hydrALAZINE 25 MG tablet  Commonly known as:  APRESOLINE  Take 3 tablets (75 mg total) by mouth every 8 (eight) hours.     levetiracetam oral soln 500 mg/5 mL (5 mL) Soln  Take 7.5 mLs (750 mg total) by mouth 2 (two) times daily.  Replaces:  levETIRAcetam 500 MG Tab     metoprolol tartrate 25 MG tablet  Commonly known as:  LOPRESSOR  Take 0.5 tablets (12.5 mg total) by mouth once daily.  Start taking on:  December 10, 2019        CONTINUE taking these medications    amLODIPine 5 MG tablet  Commonly known as:  NORVASC  Take 5 mg by mouth once daily.     aspirin 81 MG EC tablet  Commonly known as:  ECOTRIN  Take 81 mg by mouth once daily.     atorvastatin 80 MG tablet  Commonly known as:  LIPITOR  Take 80 mg by mouth every evening.     clopidogrel 75 mg tablet  Commonly known as:  PLAVIX  Take 75 mg by mouth once.     fish oil-omega-3 fatty acids 300-1,000 mg capsule  Take by mouth once daily.     Lovaza 1 gram capsule  Generic drug:  omega-3 acid ethyl esters  Take 1 g by mouth 2 (two) times daily.      metoprolol succinate 25 MG 24 hr tablet  Commonly known as:  TOPROL-XL  12.5 mg.     multivit-iron-min-folic acid 3,500-18-0.4 unit-mg-mg Chew  Take by mouth.     multivitamin capsule  Take 1 capsule by mouth once daily.     oxybutynin 5 MG Tab  Commonly known as:  DITROPAN  Take 5 mg by mouth 3 (three) times daily.     pantoprazole 20 MG tablet  Commonly known as:  PROTONIX  Take 1 tablet (20 mg total) by mouth once daily.     pravastatin 40 MG tablet  Commonly known as:  PRAVACHOL  Take 40 mg by mouth once daily.     QUEtiapine 25 MG Tab  Commonly known as:  SEROQUEL     Ranexa 500 MG Tb12  Generic drug:  ranolazine  Take 500 mg by mouth 2 (two) times daily.     sertraline 100 MG tablet  Commonly known as:  ZOLOFT  Take 100 mg by mouth once daily.     tamsulosin 0.4 mg Cap  Commonly known as:  FLOMAX  Take 0.4 mg by mouth once daily.     Victoza 3-Chase 0.6 mg/0.1 mL (18 mg/3 mL) Pnij  Generic drug:  liraglutide 0.6 mg/0.1 mL (18 mg/3 mL) subq PNIJ  Inject 1.2 mg into the skin every morning.     Vitamin D2 50,000 unit Cap  Generic drug:  ergocalciferol  Take 50,000 Units by mouth every 7 days.        STOP taking these medications    Accu-Chek Edel Plus test strp Strp  Generic drug:  blood sugar diagnostic     CHLORASEPTIC MAX SORE THROAT MM     GI COCKTAIL SIMPLE RECORD     levETIRAcetam 500 MG Tab  Commonly known as:  KEPPRA  Replaced by:  levetiracetam oral soln 500 mg/5 mL (5 mL) Soln     mupirocin 2 % ointment  Commonly known as:  BACTROBAN            Indwelling Lines/Drains at time of discharge:   Lines/Drains/Airways     None                 Time spent on the discharge of patient: > 30 minutes  Patient was seen and examined on the date of discharge and determined to be suitable for discharge.         Elvis Rondon MD  Department of Hospital Medicine  [facility]

## 2019-12-09 NOTE — NURSING
Report given to nurse Antoine from [facility]

## 2019-12-09 NOTE — NURSING
Remained free from Fall/injury. Incontinence care provided. Weight shift assistance provided. Family at bedside. Safety precautions maintained and call light within reach.

## 2019-12-09 NOTE — PLAN OF CARE
Problem: Adult Inpatient Plan of Care  Goal: Plan of Care Review  Outcome: Ongoing, Progressing  Goal: Patient-Specific Goal (Individualization)  Outcome: Ongoing, Progressing  Goal: Absence of Hospital-Acquired Illness or Injury  Outcome: Ongoing, Progressing  Goal: Optimal Comfort and Wellbeing  Outcome: Ongoing, Progressing  Goal: Readiness for Transition of Care  Outcome: Ongoing, Progressing  Goal: Rounds/Family Conference  Outcome: Ongoing, Progressing   Patient will be evaluated by PT/OT and determine further recommendations

## 2019-12-09 NOTE — PLAN OF CARE
12/09/19 1557   Post-Acute Status   Post-Acute Authorization Placement  ([facility]   Post-Acute Placement Status Set-up Complete   Discharge Delays None known at this time

## 2019-12-09 NOTE — PLAN OF CARE
SW sent updated clinicals for authorization.      12/09/19 3648   Post-Acute Status   Post-Acute Authorization Placement  ([facility])   Post-Acute Placement Status Additional Clinical Requested

## 2019-12-09 NOTE — PLAN OF CARE
CRYSTAL spoke with Nidia at [facility] and informed that updated clinicals were uploaded. Nidia informed CRYSTAL that Mai was reviewing the auth.      12/09/19 1148   Post-Acute Status   Post-Acute Authorization Placement  ([facility])   Post-Acute Placement Status Pending Payor Review

## 2019-12-09 NOTE — SUBJECTIVE & OBJECTIVE
Interval History: No new issues     Review of Systems   Unable to perform ROS: Dementia   Constitutional: Negative for activity change.   Eyes: Negative for discharge.   Respiratory: Negative for chest tightness and shortness of breath.    Cardiovascular: Negative for chest pain.   Gastrointestinal: Negative for abdominal pain.   Genitourinary: Negative for difficulty urinating.   Musculoskeletal: Negative for arthralgias.   Psychiatric/Behavioral: Negative for agitation.     Objective:     Vital Signs (Most Recent):  Temp: 98.2 °F (36.8 °C) (12/09/19 0747)  Pulse: 99 (12/09/19 0921)  Resp: 18 (12/09/19 0747)  BP: 137/78 (12/09/19 0921)  SpO2: 98 % (12/09/19 0747) Vital Signs (24h Range):  Temp:  [97.4 °F (36.3 °C)-98.4 °F (36.9 °C)] 98.2 °F (36.8 °C)  Pulse:  [83-99] 99  Resp:  [16-20] 18  SpO2:  [96 %-98 %] 98 %  BP: (122-147)/(72-83) 137/78     Weight: 87.4 kg (192 lb 10.9 oz)  Body mass index is 32.06 kg/m².    Intake/Output Summary (Last 24 hours) at 12/9/2019 1044  Last data filed at 12/8/2019 1700  Gross per 24 hour   Intake 240 ml   Output --   Net 240 ml      Physical Exam   Constitutional: He is oriented to person, place, and time. He appears well-developed and well-nourished.   HENT:   Head: Normocephalic and atraumatic.   Neurological: He is alert and oriented to person, place, and time.   Skin: Skin is warm and dry.   Psychiatric: He has a normal mood and affect.   Nursing note and vitals reviewed.      Significant Labs: CBC: No results for input(s): WBC, HGB, HCT, PLT in the last 48 hours.  CMP: No results for input(s): NA, K, CL, CO2, GLU, BUN, CREATININE, CALCIUM, PROT, ALBUMIN, BILITOT, ALKPHOS, AST, ALT, ANIONGAP, EGFRNONAA in the last 48 hours.    Invalid input(s): ESTGFAFRICA    Significant Imaging:

## 2019-12-09 NOTE — PT/OT/SLP PROGRESS
"Occupational Therapy      Patient Name:  Richard M Pipkins   MRN:  [id number]    Patient not seen today secondary to Patient ill (Comment)(Pt in bed with increased chest pain with nurse, Najma, at bedside. Pt repeatedly saying "ow"). Will follow-up later as able.    Tamiko Barron OT  12/9/2019  "

## 2019-12-09 NOTE — PLAN OF CARE
[facility]     Department of Hospital Medicine     [address] [place] [address]     [phone] after hours  [phone] fax       NURSING HOME ORDERS    12/09/2019    Admit to [facility]:  [facility]    Skilled Bed                                                 Diagnoses: Weakness/Seiaures     Active Hospital Problems    Diagnosis  POA    *Generalized weakness [R53.1]  Yes     Priority: 1 - High    Debility [R53.81]  Yes    BPH (benign prostatic hyperplasia) [N40.0]  Yes     Chronic    Hyperlipidemia [E78.5]  Yes     Chronic    Epilepsy [G40.909]  Yes     Chronic    Essential hypertension [I10]  Yes    Type 2 diabetes mellitus, without long-term current use of insulin [E11.9]  Yes    History of CVA (cerebrovascular accident) [Z86.73]  Not Applicable      Resolved Hospital Problems    Diagnosis Date Resolved POA    Change in behavior [R46.89] 12/09/2019 Yes    Change in behavior [R46.89] 12/02/2019 Yes    Leukocytosis [D72.829] 12/09/2019 Yes    Lactic acidosis [E87.2] 12/09/2019 Yes    ARF (acute renal failure) [N17.9] 12/09/2019 Yes    Altered mental state [R41.82] 12/02/2019 Yes       Patient is homebound due to:  Generalized weakness    Allergies:  Review of patient's allergies indicates:   Allergen Reactions    Penicillins        Vitals:       Routine    Diet: Low Na, ADA 2000 malissa - soft  Acitivities:     - Up in a chair each morning as tolerated       LABS:  Per facility protocol    Nursing Precautions:       - Aspiration precautions:    - Fall precautions per nursing home protocol   - Seizure precaution per [facility] protocol   - Decubitus precautions:        -  for positioning   - Pressure reducing foam mattress   - Turn patient every two hours. Use wedge pillows to anchor patient    CONSULTS:      Physical Therapy to evaluate and treat     Occupational Therapy to evaluate and treat                                   DIABETES  CARE:     Check blood sugar:         Fingerstick blood sugar AC and HS   Fingerstick blood sugar every 6 hours if unable to eat      Report CBG < 60 or > 400 to physician.                                          Insulin Sliding Scale          Glucose  Novolog Insulin Subcutaneous        0 - 60   Orange juice or glucose tablet, hold insulin      No insulin   201-250  2 units   251-300  4 units   301-350  6 units   351-400  8 units   >400   10 units then call physician      Medications: Discontinue all previous medication orders, if any. See new list below.     Jo-AnnMaciel jaime   Home Medication Instructions ULISSES:[id number]    Printed on:12/09/19 3295   Medication Information                      amLODIPine (NORVASC) 5 MG tablet  Take 5 mg by mouth once daily.              aspirin (ECOTRIN) 81 MG EC tablet  Take 81 mg by mouth once daily.             atorvastatin (LIPITOR) 80 MG tablet  Take 80 mg by mouth every evening.              clopidogrel (PLAVIX) 75 mg tablet  Take 75 mg by mouth once.              ergocalciferol (VITAMIN D2) 50,000 unit Cap  Take 50,000 Units by mouth every 7 days.              fish oil-omega-3 fatty acids 300-1,000 mg capsule  Take by mouth once daily.             hydrALAZINE (APRESOLINE) 25 MG tablet  Take 3 tablets (75 mg total) by mouth every 8 (eight) hours.             levetiracetam oral soln 500 mg/5 mL (5 mL) Soln  Take 7.5 mLs (750 mg total) by mouth 2 (two) times daily.             LOVAZA 1 gram capsule  Take 1 g by mouth 2 (two) times daily.              metoprolol tartrate (LOPRESSOR) 25 MG tablet  Take 0.5 tablets (12.5 mg total) by mouth once daily.             MULTIVIT-IRON-MIN-FOLIC ACID 3,500-18-0.4 UNIT-MG-MG ORAL CHEW  Take by mouth.             multivitamin capsule  Take 1 capsule by mouth once daily.             oxybutynin (DITROPAN) 5 MG Tab  Take 5 mg by mouth 3 (three) times daily.              pantoprazole (PROTONIX) 20 MG tablet  Take 1 tablet (20 mg total)  by mouth once daily.             pravastatin (PRAVACHOL) 40 MG tablet  Take 40 mg by mouth once daily.              QUEtiapine (SEROQUEL) 25 MG Tab one per night                RANEXA 500 mg Tb12  Take 500 mg by mouth 2 (two) times daily.              sertraline (ZOLOFT) 100 MG tablet  Take 100 mg by mouth once daily.              tamsulosin (FLOMAX) 0.4 mg Cp24  Take 0.4 mg by mouth once daily.              VICTOZA 3-ANATOLIY 0.6 mg/0.1 mL (18 mg/3 mL) PnIj  Inject 1.2 mg into the skin every morning.                        _________________________________  Elvis Rondon MD  12/09/2019

## 2019-12-09 NOTE — NURSING TRANSFER
Nursing Transfer Note      12/9/2019     Transfer To: [facility]    Transfer via wheelchair    Transported by SPD    Chart send with patient: Yes

## 2019-12-09 NOTE — PLAN OF CARE
Pt remained free from falls and injury during shift, medication administered per MD orders, pt swallowed pills with no problem, turned q 2 hours, Neuro checks q 4 hours, NNAMDI, safety maintained, call bell in reach, will continue to monitor.

## 2019-12-09 NOTE — PLAN OF CARE
CRYSTAL informed nurse Najma that patient is ready for discharge from case management. SW gave nurse call report. SW also informed wife that patient is discharging and transportation will pick him up at 3:30pm.     ADT 30 order placed for Van Transportation.  Requested  time: 3:30 pm  (Wheelchair)  If transportation does not arrive at ETA time nurse will be instructed to follow protocol for transportation below:   How can I get in touch directly with dispatch, if needed?                 Non-emergent dispatch: [phone]      NURSING:  If Van does not arrive at requested time please call the above Non Emergent Dispatcher.  If issue not resolved please escalate to your charge nurse for further instructions.       12/09/19 1442   Final Note   Assessment Type Final Discharge Note   Anticipated Discharge Disposition SNF   What phone number can be called within the next 1-3 days to see how you are doing after discharge? [phone]   Hospital Follow Up  Appt(s) scheduled? No   Discharge plans and expectations educations in teach back method with documentation complete? No   Right Care Referral Info   Post Acute Recommendation SNF / Sub-Acute Rehab   Referral Type SNF   Facility Name [place]

## 2019-12-09 NOTE — PLAN OF CARE
CRYSTAL contacted patient's wife to follow up on SNF placement. Wife informed SW that she completed paperwork at [facility]. Via [employer], [place] informed SW that she submitted for authorization on Friday. CRYSTAL will contact [facility] to follow up.      12/09/19 0833   Post-Acute Status   Post-Acute Authorization Placement  ([facility])   Post-Acute Placement Status Pending Payor Review

## 2019-12-09 NOTE — PLAN OF CARE
12/09/19 1556   Post-Acute Status   Post-Acute Authorization Placement  ([facility])   Post-Acute Placement Status Authorization Obtained

## 2019-12-09 NOTE — NURSING
Notified Dr. Weesk that patient complains of chest pain.   B/P 137/78,  Pulse 99, O2 RA 99. Orders: EKG and Troponin. Will continue to monitor.

## 2019-12-09 NOTE — PT/OT/SLP DISCHARGE
Occupational Therapy Discharge Summary    Richard M Pipkins  MRN: [id number]   Principal Problem: Generalized weakness      Patient Discharged from acute Occupational Therapy on 12/10/19.  Please refer to prior OT notes for functional status.    Assessment:      Patient appropriate for care in another setting.    Objective:     GOALS:   Multidisciplinary Problems     Occupational Therapy Goals        Problem: Occupational Therapy Goal    Goal Priority Disciplines Outcome Interventions   Occupational Therapy Goal     OT, PT/OT Ongoing, Progressing    Description:  Goals to be met by: 12/18/19     Patient will increase functional independence with ADLs by performing:    Feeding with Set-up Assistance.  UE Dressing with Set-up Assistance.  LE Dressing with Contact Guard Assistance.  Grooming while standing at sink with Stand-by Assistance.  Toileting from bedside commode with Stand-by Assistance for hygiene and clothing management.   Supine to sit with Contact Guard Assistance.  Step transfer with Stand-by Assistance  Toilet transfer to bedside commode with Stand-by Assistance.  Upper extremity exercise program x15 reps per handout, with supervision.                      Reasons for Discontinuation of Therapy Services  Transfer to alternate level of care.      Plan:     Patient Discharged to: Skilled Nursing Facility ([facility])    Tamiko Barron OT  12/9/2019

## 2019-12-09 NOTE — PROGRESS NOTES
[facility] Hospital Medicine  Progress Note    Patient Name: Richard M Pipkins  MRN: [id number]  Patient Class: IP- Inpatient   Admission Date: 12/2/2019  Length of Stay: 7 days  Attending Physician: Elvis Weeks MD  Primary Care Provider: Annalee Campos MD        Subjective:     Principal Problem:Generalized weakness        HPI:  63 y/o male with hx of CVA with left sided weakness presents to the ER via EMS for an evaluation of generalized weakness since this morning.  Patient could not walk on his own this morning and was not talking.  Patient's most recent known normal was 7pm last night when he went to bed.  He has chronic bilateral L>R weakness and chronic incontinence but has not had incontinence today. Patient's wife was concerned because he has not been mobile or talking today.  This is different from his baseline.  Patient was evaluated for possible stroke in ER.  Labs then showed significant leukocytosis and lactic acidosis.  Patient's wife denies any fevers, chills, cough, shortness of breath or any other complaints.    Overview/Hospital Course:  Mr. Pipkins presented with generalized weakness and changes in activity level.  Initial head CT unremarkable.  He was also noted to have leukocytosis, hypotension and lactic acidosis with no obvious source of infection.  Blood and urine cultures obtained.  Empiric antibiotics of cefepime and vancomycin initiated.  Neurology consulted and started on anti-seizure meds for possible seizures. The patient had a sig. WBC elevation with negative urine and blood cultures.  Prophylactic Abx's were started in the ICU. Patient's hypotension resolved and the patient was transferred out of the ICU on 12/3.  ID was consulted on 12/4. PT/OT were consulted and recommended SNF. ID noted that the leukocytosis may have been reactive to possible seizure and given all negative work up- stopped all Abx. Leukocytosis resolved. ID consulted and  recommended to stop Abx and monitor. The patient will be going to [facility] today. Activity as tolerated. Diet- soft/diabetic 2000 malissa.Follow up with PCP in one week     Interval History: No new issues     Review of Systems   Unable to perform ROS: Dementia   Constitutional: Negative for activity change.   Eyes: Negative for discharge.   Respiratory: Negative for chest tightness and shortness of breath.    Cardiovascular: Negative for chest pain.   Gastrointestinal: Negative for abdominal pain.   Genitourinary: Negative for difficulty urinating.   Musculoskeletal: Negative for arthralgias.   Psychiatric/Behavioral: Negative for agitation.     Objective:     Vital Signs (Most Recent):  Temp: 98.2 °F (36.8 °C) (12/09/19 0747)  Pulse: 99 (12/09/19 0921)  Resp: 18 (12/09/19 0747)  BP: 137/78 (12/09/19 0921)  SpO2: 98 % (12/09/19 0747) Vital Signs (24h Range):  Temp:  [97.4 °F (36.3 °C)-98.4 °F (36.9 °C)] 98.2 °F (36.8 °C)  Pulse:  [83-99] 99  Resp:  [16-20] 18  SpO2:  [96 %-98 %] 98 %  BP: (122-147)/(72-83) 137/78     Weight: 87.4 kg (192 lb 10.9 oz)  Body mass index is 32.06 kg/m².    Intake/Output Summary (Last 24 hours) at 12/9/2019 1044  Last data filed at 12/8/2019 1700  Gross per 24 hour   Intake 240 ml   Output --   Net 240 ml      Physical Exam   Constitutional: He is oriented to person, place, and time. He appears well-developed and well-nourished.   HENT:   Head: Normocephalic and atraumatic.   Neurological: He is alert and oriented to person, place, and time.   Skin: Skin is warm and dry.   Psychiatric: He has a normal mood and affect.   Nursing note and vitals reviewed.      Significant Labs: CBC: No results for input(s): WBC, HGB, HCT, PLT in the last 48 hours.  CMP: No results for input(s): NA, K, CL, CO2, GLU, BUN, CREATININE, CALCIUM, PROT, ALBUMIN, BILITOT, ALKPHOS, AST, ALT, ANIONGAP, EGFRNONAA in the last 48 hours.    Invalid input(s): ISMAEL    Significant Imaging:      Assessment/Plan:       * Generalized weakness  Patient presents with generalized weakness and apparent aphasia.  Evaluated for CVA in ER, but then noted to have significant leukocytosis and lactic acidosis.  Workup with MRI negative for acute CVA and MRA with noted 50% stenosis of left ICA distal left M1 segment, but no occlusion, aneurysm or vascular malformation  Neurology consulted and final input pending  Unsure etiology of leukocytosis and lactic acidosis.    Slightly hypotensive on presentation and admitted to ICU.  No obvious source of infection  Possible hypovolemia a factor given improvement with IV fluids versus medication induced  Empirically started on broad spectrum antibiotics of cefepime and vancomycin  Patient does have hx of seizures, possible seizure causing current symptoms and contributing to leukocytosis.  Clinically improved but may need EEG  Blood and urine cultures no growth to date  Will continue IV fluids and continue to hold all anti hypertensive medications  Will deescalate antibiotics with stopping vancomycin today, but will continue cefepime for now  Consult placed to ID given increasing WBC count  Will follow up Neurology recommendations  Stable for transfer to the floor today  Consider stopping all antibiotics tomorrow if he remains afebrile and follow up on ID recommendations  Consult PT and OT    Will likely need SNF and wife favoring this. PPD and social work consult.  Wont happen until Mon    Debility  Consult PT/OT. Will need SNF. PPD and social work consult       Change in behavior  As discussed above    Epilepsy  As discussed above.  Continue levetiracetum    No further seizures.     Hyperlipidemia  Continue atorvastatin    BPH (benign prostatic hyperplasia)  Resume home medications.    ARF (acute renal failure)  Creatinine higher than baseline which is somewhere between 1.1 to 1.4  Unsure etiology, but likely pre renal  Renal function improving with IV hydration alone  Avoid nephrotoxic  medications.  Continue IV fluids   Repeat laboratory testing in a.m.    Resolved     Lactic acidosis  Unclear etiology  Possibly hypovolemia with resultant hypoperfusion versus infectious  Clinically improving      Leukocytosis  As discussed above  Will continue monitor    Will consult ID today    WBC count elevation has resolved. May have been from seizure. ID stopped Abx and signed off.     Essential hypertension  Permissive HTN while being ruled out for CVA.  However, blood pressure remains in the normal range and not hypertensive  Continue to hold all medications for now  Patient may need medication adjustment    History of CVA (cerebrovascular accident)  Chronic residual left sided weakness  Continue aspirin, Plavix and atorvastatin  As discussed above    Type 2 diabetes mellitus, without long-term current use of insulin  Hemoglobin A1c 7.6  Blood glucose fluctuated between 70 - 178  Continue sliding scale for now and diet advanced to diabetic after swallowing test passed      VTE Risk Mitigation (From admission, onward)         Ordered     enoxaparin injection 30 mg  Daily      12/02/19 1838     IP VTE LOW RISK PATIENT  Once      12/02/19 1837     Place NNAMDI hose  Until discontinued      12/02/19 1837              D/C to [facility]     Addendum 3:21pm  Notified of some CP awhile ago that lasted only of minutes. EKG with acute ischemic changes. Trop completely normal  No further CP.    Elvis Rondon MD  Department of Hospital Medicine   [facility]

## 2020-06-04 ENCOUNTER — HOSPITAL ENCOUNTER (EMERGENCY)
Facility: HOSPITAL | Age: 65
Discharge: HOME | End: 2020-06-05
Attending: INTERNAL MEDICINE
Payer: MEDICARE

## 2020-06-04 VITALS
BODY MASS INDEX: 29.55 KG/M2 | TEMPERATURE: 98 F | HEART RATE: 88 BPM | OXYGEN SATURATION: 99 % | HEIGHT: 68 IN | SYSTOLIC BLOOD PRESSURE: 148 MMHG | WEIGHT: 195 LBS | RESPIRATION RATE: 20 BRPM | DIASTOLIC BLOOD PRESSURE: 70 MMHG

## 2020-06-04 DIAGNOSIS — R31.9 HEMATURIA, UNSPECIFIED TYPE: Primary | ICD-10-CM

## 2020-06-04 LAB
BACTERIA #/AREA URNS HPF: ABNORMAL /HPF
BILIRUB UR QL STRIP: NEGATIVE
CLARITY UR: ABNORMAL
COLOR UR: YELLOW
GLUCOSE UR QL STRIP: NEGATIVE
HGB UR QL STRIP: ABNORMAL
HYALINE CASTS #/AREA URNS LPF: ABNORMAL /LPF
KETONES UR QL STRIP: NEGATIVE
LEUKOCYTE ESTERASE UR QL STRIP: ABNORMAL
MICROSCOPIC COMMENT: ABNORMAL
NITRITE UR QL STRIP: NEGATIVE
PH UR STRIP: 6 [PH] (ref 5–8)
PROT UR QL STRIP: ABNORMAL
RBC #/AREA URNS HPF: 50 /HPF (ref 0–4)
SP GR UR STRIP: 1.01 (ref 1–1.03)
SQUAMOUS #/AREA URNS HPF: ABNORMAL /HPF
URN SPEC COLLECT METH UR: ABNORMAL
UROBILINOGEN UR STRIP-ACNC: NEGATIVE EU/DL
WBC #/AREA URNS HPF: >100 /HPF (ref 0–5)

## 2020-06-04 PROCEDURE — 81000 URINALYSIS NONAUTO W/SCOPE: CPT

## 2020-06-04 PROCEDURE — 99283 EMERGENCY DEPT VISIT LOW MDM: CPT | Mod: ER

## 2020-06-04 PROCEDURE — 81003 URINALYSIS AUTO W/O SCOPE: CPT | Mod: ER

## 2020-06-04 PROCEDURE — 25000003 PHARM REV CODE 250: Mod: ER | Performed by: INTERNAL MEDICINE

## 2020-06-04 RX ORDER — NITROFURANTOIN 25; 75 MG/1; MG/1
100 CAPSULE ORAL
Status: COMPLETED | OUTPATIENT
Start: 2020-06-04 | End: 2020-06-04

## 2020-06-04 RX ORDER — NITROFURANTOIN 25; 75 MG/1; MG/1
100 CAPSULE ORAL 2 TIMES DAILY
Qty: 14 CAPSULE | Refills: 0 | Status: SHIPPED | OUTPATIENT
Start: 2020-06-04 | End: 2020-06-11

## 2020-06-04 RX ADMIN — NITROFURANTOIN (MONOHYDRATE/MACROCRYSTALS) 100 MG: 75; 25 CAPSULE ORAL at 11:06

## 2020-06-05 NOTE — ED PROVIDER NOTES
Encounter Date: 6/4/2020    SCRIBE #1 NOTE: I, Dank Kilpatrick, am scribing for, and in the presence of,  Dr. Jean Baptiste. I have scribed the following portions of the note - Other sections scribed: HPI, ROS, PE.       History     Chief Complaint   Patient presents with    Penile Discharge     bleeding from penis starting around 3:30pm today     Richard M Pipkins is a 64 y.o. male who presents to the ED complaining of penile bleeding onset 5 hours PTA. Denies dysuria, penile pain, or penile swelling.     The history is provided by the patient. No  was used.     Review of patient's allergies indicates:   Allergen Reactions    Penicillins      Past Medical History:   Diagnosis Date    Diabetes mellitus     Hypertension     Seizures     Stroke      Past Surgical History:   Procedure Laterality Date    BRAIN SURGERY      ESOPHAGOGASTRODUODENOSCOPY N/A 3/11/2019    Procedure: EGD (ESOPHAGOGASTRODUODENOSCOPY);  Surgeon: Snehal Chavira MD;  Location: [facility];  Service: Endoscopy;  Laterality: N/A;    HERNIA REPAIR       History reviewed. No pertinent family history.  Social History     Tobacco Use    Smoking status: Never Smoker    Smokeless tobacco: Never Used   Substance Use Topics    Alcohol use: No     Alcohol/week: 0.0 standard drinks    Drug use: No     Review of Systems   Constitutional: Negative for fever.   HENT: Negative for sore throat.    Respiratory: Negative for shortness of breath.    Cardiovascular: Negative for chest pain.   Gastrointestinal: Negative for diarrhea, nausea and vomiting.   Genitourinary: Negative for dysuria, penile pain and penile swelling.        Penile bleeding   Musculoskeletal: Negative for back pain.   Skin: Negative for rash.   Neurological: Negative for weakness and headaches.   Psychiatric/Behavioral: Negative for behavioral problems.   All other systems reviewed and are negative.      Physical Exam     Initial Vitals [06/04/20 2015]   BP Pulse Resp Temp  SpO2   (!) 151/85 93 20 98.8 °F (37.1 °C) 99 %      MAP       --         Physical Exam    Nursing note and vitals reviewed.  Constitutional: He appears well-developed and well-nourished.   HENT:   Head: Normocephalic and atraumatic.   Eyes: Conjunctivae are normal.   Neck: Normal range of motion. Neck supple.   Cardiovascular: Normal rate, regular rhythm and normal heart sounds. Exam reveals no gallop and no friction rub.    No murmur heard.  Pulmonary/Chest: Breath sounds normal. No respiratory distress. He has no wheezes. He has no rhonchi. He has no rales.   Abdominal: Soft. There is no tenderness.   Genitourinary: Circumcised.   Genitourinary Comments: No active bleeding, but scant blood at urethral meatus.   Musculoskeletal: Normal range of motion. He exhibits no edema.   Neurological: He is alert and oriented to person, place, and time. GCS score is 15. GCS eye subscore is 4. GCS verbal subscore is 5. GCS motor subscore is 6.   Skin: Skin is warm and dry.   Psychiatric: He has a normal mood and affect.         ED Course   Procedures  Labs Reviewed   URINALYSIS - Abnormal; Notable for the following components:       Result Value    Appearance, UA Cloudy (*)     Protein, UA 1+ (*)     Occult Blood UA 3+ (*)     Leukocytes, UA 3+ (*)     All other components within normal limits   URINALYSIS MICROSCOPIC - Abnormal; Notable for the following components:    RBC, UA 50 (*)     WBC, UA >100 (*)     Bacteria Many (*)     All other components within normal limits   POCT URINALYSIS W/O SCOPE          Imaging Results    None          Medical Decision Making:   History:   Old Medical Records: I decided to obtain old medical records.  Initial Assessment:   Richard M Pipkins is a 64 y.o. male who presents to the ED complaining of penile bleeding onset 5 hours PTA. Denies dysuria, penile pain, or penile swelling.   Clinical Tests:   Lab Tests: Ordered and Reviewed  ED Management:  Urinalysis reveals hematuria.  Empiric  treatment with Macrobid was given and patient was advised to follow with primary care physician within the next 2 days for re-evaluation/return to the emergency department condition worsens.  Instructions for hematuria were given prior to discharge.            Scribe Attestation:   Scribe #1: I performed the above scribed service and the documentation accurately describes the services I performed. I attest to the accuracy of the note.    This document was produced by a scribe under my direction and in my presence. I agree with the content of the note and have made any necessary edits.     Dr. Jean Baptiste    06/05/2020 2:51 AM                        Clinical Impression:     1. Hematuria, unspecified type            Disposition:   Disposition: Discharged  Condition: Stable                        Slick Jean Baptiste MD  06/05/20 0251

## 2020-06-05 NOTE — ED NOTES
Richard M Pipkinspresents to ED with c/o penile discharge blood past 3 hours. Pt denies any pain with the bleeding at the penis or pian with urination. Pt is incontinent   Mucous membranes are pink and moist. Skin is warm, dry and intact. Lungs are clear bilaterally, respirations are regular and unlabored. Denies SOB, cough, congestion or rhinorrhea. BS active x4, no tenderness with palpitation, abd is soft and not distended. Denies any appetite or activity change. S1S2, capillary refill is < 2 secs.Denies dysuria, difficulty urinating, frequency, numbness, tingling or weakness. NAND VSS

## 2021-01-20 ENCOUNTER — HOSPITAL ENCOUNTER (EMERGENCY)
Facility: HOSPITAL | Age: 66
Discharge: HOME | End: 2021-01-20
Attending: EMERGENCY MEDICINE
Payer: MEDICARE

## 2021-01-20 VITALS
WEIGHT: 180 LBS | BODY MASS INDEX: 27.28 KG/M2 | SYSTOLIC BLOOD PRESSURE: 166 MMHG | HEART RATE: 65 BPM | RESPIRATION RATE: 13 BRPM | DIASTOLIC BLOOD PRESSURE: 77 MMHG | OXYGEN SATURATION: 99 % | TEMPERATURE: 98 F | HEIGHT: 68 IN

## 2021-01-20 DIAGNOSIS — R55 SYNCOPE, UNSPECIFIED SYNCOPE TYPE: Primary | ICD-10-CM

## 2021-01-20 DIAGNOSIS — R41.82 ALTERED MENTAL STATUS: ICD-10-CM

## 2021-01-20 DIAGNOSIS — R06.02 SOB (SHORTNESS OF BREATH): ICD-10-CM

## 2021-01-20 LAB
ALBUMIN SERPL BCP-MCNC: 3.4 G/DL (ref 3.5–5.2)
ALP SERPL-CCNC: 51 U/L (ref 55–135)
ALT SERPL W/O P-5'-P-CCNC: 20 U/L (ref 10–44)
ANION GAP SERPL CALC-SCNC: 5 MMOL/L (ref 8–16)
AST SERPL-CCNC: 20 U/L (ref 10–40)
BACTERIA #/AREA URNS HPF: ABNORMAL /HPF
BASOPHILS # BLD AUTO: 0.03 K/UL (ref 0–0.2)
BASOPHILS NFR BLD: 0.4 % (ref 0–1.9)
BILIRUB SERPL-MCNC: 0.4 MG/DL (ref 0.1–1)
BILIRUB UR QL STRIP: NEGATIVE
BNP SERPL-MCNC: <10 PG/ML (ref 0–99)
BUN SERPL-MCNC: 24 MG/DL (ref 8–23)
CALCIUM SERPL-MCNC: 8.6 MG/DL (ref 8.7–10.5)
CHLORIDE SERPL-SCNC: 107 MMOL/L (ref 95–110)
CLARITY UR: ABNORMAL
CO2 SERPL-SCNC: 30 MMOL/L (ref 23–29)
COLOR UR: YELLOW
CREAT SERPL-MCNC: 1.6 MG/DL (ref 0.5–1.4)
DIFFERENTIAL METHOD: ABNORMAL
EOSINOPHIL # BLD AUTO: 0.3 K/UL (ref 0–0.5)
EOSINOPHIL NFR BLD: 4.1 % (ref 0–8)
ERYTHROCYTE [DISTWIDTH] IN BLOOD BY AUTOMATED COUNT: 15.4 % (ref 11.5–14.5)
EST. GFR  (AFRICAN AMERICAN): 51 ML/MIN/1.73 M^2
EST. GFR  (NON AFRICAN AMERICAN): 44 ML/MIN/1.73 M^2
GLUCOSE SERPL-MCNC: 79 MG/DL (ref 70–110)
GLUCOSE UR QL STRIP: NEGATIVE
HCT VFR BLD AUTO: 40.8 % (ref 40–54)
HGB BLD-MCNC: 13.3 G/DL (ref 14–18)
HGB UR QL STRIP: NEGATIVE
IMM GRANULOCYTES # BLD AUTO: 0.02 K/UL (ref 0–0.04)
IMM GRANULOCYTES NFR BLD AUTO: 0.3 % (ref 0–0.5)
KETONES UR QL STRIP: NEGATIVE
LEUKOCYTE ESTERASE UR QL STRIP: ABNORMAL
LYMPHOCYTES # BLD AUTO: 3.1 K/UL (ref 1–4.8)
LYMPHOCYTES NFR BLD: 42.1 % (ref 18–48)
MCH RBC QN AUTO: 30.1 PG (ref 27–31)
MCHC RBC AUTO-ENTMCNC: 32.6 G/DL (ref 32–36)
MCV RBC AUTO: 92 FL (ref 82–98)
MICROSCOPIC COMMENT: ABNORMAL
MONOCYTES # BLD AUTO: 0.3 K/UL (ref 0.3–1)
MONOCYTES NFR BLD: 3.8 % (ref 4–15)
NEUTROPHILS # BLD AUTO: 3.6 K/UL (ref 1.8–7.7)
NEUTROPHILS NFR BLD: 49.3 % (ref 38–73)
NITRITE UR QL STRIP: NEGATIVE
NRBC BLD-RTO: 0 /100 WBC
PH UR STRIP: 6 [PH] (ref 5–8)
PLATELET # BLD AUTO: 275 K/UL (ref 150–350)
PMV BLD AUTO: 10.2 FL (ref 9.2–12.9)
POTASSIUM SERPL-SCNC: 4.1 MMOL/L (ref 3.5–5.1)
PROT SERPL-MCNC: 6.7 G/DL (ref 6–8.4)
PROT UR QL STRIP: ABNORMAL
RBC # BLD AUTO: 4.42 M/UL (ref 4.6–6.2)
RBC #/AREA URNS HPF: 2 /HPF (ref 0–4)
SODIUM SERPL-SCNC: 142 MMOL/L (ref 136–145)
SP GR UR STRIP: 1.02 (ref 1–1.03)
TROPONIN I SERPL DL<=0.01 NG/ML-MCNC: <0.006 NG/ML (ref 0–0.03)
UNIDENT CRYS URNS QL MICRO: ABNORMAL
URN SPEC COLLECT METH UR: ABNORMAL
UROBILINOGEN UR STRIP-ACNC: NEGATIVE EU/DL
WBC # BLD AUTO: 7.31 K/UL (ref 3.9–12.7)
WBC #/AREA URNS HPF: 0 /HPF (ref 0–5)
WBC CLUMPS URNS QL MICRO: ABNORMAL

## 2021-01-20 PROCEDURE — 84484 ASSAY OF TROPONIN QUANT: CPT

## 2021-01-20 PROCEDURE — 80177 DRUG SCRN QUAN LEVETIRACETAM: CPT

## 2021-01-20 PROCEDURE — 25000003 PHARM REV CODE 250: Performed by: EMERGENCY MEDICINE

## 2021-01-20 PROCEDURE — 63600175 PHARM REV CODE 636 W HCPCS: Performed by: EMERGENCY MEDICINE

## 2021-01-20 PROCEDURE — 93010 ELECTROCARDIOGRAM REPORT: CPT | Mod: ,,, | Performed by: INTERNAL MEDICINE

## 2021-01-20 PROCEDURE — 80053 COMPREHEN METABOLIC PANEL: CPT

## 2021-01-20 PROCEDURE — 93010 EKG 12-LEAD: ICD-10-PCS | Mod: ,,, | Performed by: INTERNAL MEDICINE

## 2021-01-20 PROCEDURE — 93005 ELECTROCARDIOGRAM TRACING: CPT

## 2021-01-20 PROCEDURE — 96361 HYDRATE IV INFUSION ADD-ON: CPT

## 2021-01-20 PROCEDURE — 96365 THER/PROPH/DIAG IV INF INIT: CPT

## 2021-01-20 PROCEDURE — 99285 EMERGENCY DEPT VISIT HI MDM: CPT | Mod: 25

## 2021-01-20 PROCEDURE — 83880 ASSAY OF NATRIURETIC PEPTIDE: CPT

## 2021-01-20 PROCEDURE — 85025 COMPLETE CBC W/AUTO DIFF WBC: CPT

## 2021-01-20 PROCEDURE — 81000 URINALYSIS NONAUTO W/SCOPE: CPT

## 2021-01-20 RX ORDER — LEVETIRACETAM 100 MG/ML
750 SOLUTION ORAL ONCE
Status: COMPLETED | OUTPATIENT
Start: 2021-01-20 | End: 2021-01-20

## 2021-01-20 RX ORDER — AMLODIPINE BESYLATE 5 MG/1
1 TABLET ORAL DAILY
COMMUNITY
Start: 2020-07-08

## 2021-01-20 RX ORDER — CEPHALEXIN 500 MG/1
500 CAPSULE ORAL 2 TIMES DAILY
Qty: 10 CAPSULE | Refills: 0 | Status: SHIPPED | OUTPATIENT
Start: 2021-01-20 | End: 2021-01-25

## 2021-01-20 RX ORDER — ASPIRIN 81 MG/1
1 TABLET ORAL DAILY
COMMUNITY

## 2021-01-20 RX ADMIN — LEVETIRACETAM 750 MG: 100 SOLUTION ORAL at 12:01

## 2021-01-20 RX ADMIN — SODIUM CHLORIDE 1000 ML: 9 INJECTION, SOLUTION INTRAVENOUS at 10:01

## 2021-01-20 RX ADMIN — CEFTRIAXONE 1 G: 1 INJECTION, SOLUTION INTRAVENOUS at 11:01

## 2021-01-23 LAB — LEVETIRACETAM SERPL-MCNC: 11.9 UG/ML (ref 3–60)

## 2021-03-01 ENCOUNTER — HOSPITAL ENCOUNTER (EMERGENCY)
Facility: HOSPITAL | Age: 66
Discharge: HOME | End: 2021-03-01
Attending: EMERGENCY MEDICINE
Payer: MEDICARE

## 2021-03-01 VITALS
DIASTOLIC BLOOD PRESSURE: 76 MMHG | OXYGEN SATURATION: 96 % | RESPIRATION RATE: 11 BRPM | HEIGHT: 66 IN | HEART RATE: 62 BPM | WEIGHT: 160 LBS | BODY MASS INDEX: 25.71 KG/M2 | SYSTOLIC BLOOD PRESSURE: 146 MMHG | TEMPERATURE: 98 F

## 2021-03-01 DIAGNOSIS — R56.9 SEIZURES: Primary | ICD-10-CM

## 2021-03-01 DIAGNOSIS — R41.0 CONFUSION: ICD-10-CM

## 2021-03-01 LAB
ALBUMIN SERPL BCP-MCNC: 3.6 G/DL (ref 3.5–5.2)
ALP SERPL-CCNC: 51 U/L (ref 55–135)
ALT SERPL W/O P-5'-P-CCNC: 19 U/L (ref 10–44)
ANION GAP SERPL CALC-SCNC: 11 MMOL/L (ref 8–16)
AST SERPL-CCNC: 17 U/L (ref 10–40)
BASOPHILS # BLD AUTO: 0.03 K/UL (ref 0–0.2)
BASOPHILS NFR BLD: 0.4 % (ref 0–1.9)
BILIRUB SERPL-MCNC: 0.4 MG/DL (ref 0.1–1)
BUN SERPL-MCNC: 16 MG/DL (ref 8–23)
CALCIUM SERPL-MCNC: 8.4 MG/DL (ref 8.7–10.5)
CHLORIDE SERPL-SCNC: 105 MMOL/L (ref 95–110)
CO2 SERPL-SCNC: 26 MMOL/L (ref 23–29)
CREAT SERPL-MCNC: 1.7 MG/DL (ref 0.5–1.4)
DIFFERENTIAL METHOD: ABNORMAL
EOSINOPHIL # BLD AUTO: 0.1 K/UL (ref 0–0.5)
EOSINOPHIL NFR BLD: 1.1 % (ref 0–8)
ERYTHROCYTE [DISTWIDTH] IN BLOOD BY AUTOMATED COUNT: 15.4 % (ref 11.5–14.5)
EST. GFR  (AFRICAN AMERICAN): 48 ML/MIN/1.73 M^2
EST. GFR  (NON AFRICAN AMERICAN): 41 ML/MIN/1.73 M^2
GLUCOSE SERPL-MCNC: 121 MG/DL (ref 70–110)
HCT VFR BLD AUTO: 42.7 % (ref 40–54)
HGB BLD-MCNC: 14.3 G/DL (ref 14–18)
IMM GRANULOCYTES # BLD AUTO: 0.02 K/UL (ref 0–0.04)
IMM GRANULOCYTES NFR BLD AUTO: 0.2 % (ref 0–0.5)
LYMPHOCYTES # BLD AUTO: 2.1 K/UL (ref 1–4.8)
LYMPHOCYTES NFR BLD: 25.5 % (ref 18–48)
MCH RBC QN AUTO: 30.3 PG (ref 27–31)
MCHC RBC AUTO-ENTMCNC: 33.5 G/DL (ref 32–36)
MCV RBC AUTO: 91 FL (ref 82–98)
MONOCYTES # BLD AUTO: 0.4 K/UL (ref 0.3–1)
MONOCYTES NFR BLD: 5.1 % (ref 4–15)
NEUTROPHILS # BLD AUTO: 5.6 K/UL (ref 1.8–7.7)
NEUTROPHILS NFR BLD: 67.7 % (ref 38–73)
NRBC BLD-RTO: 0 /100 WBC
PLATELET # BLD AUTO: 299 K/UL (ref 150–350)
PMV BLD AUTO: 10.2 FL (ref 9.2–12.9)
POTASSIUM SERPL-SCNC: 3.4 MMOL/L (ref 3.5–5.1)
PROT SERPL-MCNC: 7.1 G/DL (ref 6–8.4)
RBC # BLD AUTO: 4.72 M/UL (ref 4.6–6.2)
SODIUM SERPL-SCNC: 142 MMOL/L (ref 136–145)
WBC # BLD AUTO: 8.26 K/UL (ref 3.9–12.7)

## 2021-03-01 PROCEDURE — 80053 COMPREHEN METABOLIC PANEL: CPT

## 2021-03-01 PROCEDURE — 25000003 PHARM REV CODE 250: Performed by: EMERGENCY MEDICINE

## 2021-03-01 PROCEDURE — 93010 ELECTROCARDIOGRAM REPORT: CPT | Mod: ,,, | Performed by: INTERNAL MEDICINE

## 2021-03-01 PROCEDURE — 99285 EMERGENCY DEPT VISIT HI MDM: CPT | Mod: 25

## 2021-03-01 PROCEDURE — 85025 COMPLETE CBC W/AUTO DIFF WBC: CPT

## 2021-03-01 PROCEDURE — 80177 DRUG SCRN QUAN LEVETIRACETAM: CPT

## 2021-03-01 PROCEDURE — 93010 EKG 12-LEAD: ICD-10-PCS | Mod: ,,, | Performed by: INTERNAL MEDICINE

## 2021-03-01 PROCEDURE — 93005 ELECTROCARDIOGRAM TRACING: CPT

## 2021-03-01 RX ORDER — LEVETIRACETAM 100 MG/ML
750 SOLUTION ORAL ONCE
Status: DISCONTINUED | OUTPATIENT
Start: 2021-03-01 | End: 2021-03-01

## 2021-03-01 RX ORDER — LEVETIRACETAM 100 MG/ML
750 SOLUTION ORAL ONCE
Status: COMPLETED | OUTPATIENT
Start: 2021-03-01 | End: 2021-03-01

## 2021-03-01 RX ADMIN — LEVETIRACETAM 750 MG: 500 SOLUTION ORAL at 03:03

## 2021-03-04 LAB — LEVETIRACETAM SERPL-MCNC: 12.2 UG/ML (ref 3–60)

## 2022-09-08 NOTE — NURSING
Patient off unit to scheduled MRI. Patient with no complaints of pain and NAD noted.    numerical 0-10